# Patient Record
Sex: FEMALE | Race: WHITE
[De-identification: names, ages, dates, MRNs, and addresses within clinical notes are randomized per-mention and may not be internally consistent; named-entity substitution may affect disease eponyms.]

---

## 2017-10-18 ENCOUNTER — HOSPITAL ENCOUNTER (OUTPATIENT)
Dept: HOSPITAL 80 - FCATH | Age: 77
Discharge: HOME | End: 2017-10-18
Attending: INTERNAL MEDICINE
Payer: COMMERCIAL

## 2017-10-18 DIAGNOSIS — E78.5: ICD-10-CM

## 2017-10-18 DIAGNOSIS — G47.34: ICD-10-CM

## 2017-10-18 DIAGNOSIS — I10: ICD-10-CM

## 2017-10-18 DIAGNOSIS — R06.02: ICD-10-CM

## 2017-10-18 DIAGNOSIS — Z95.2: ICD-10-CM

## 2017-10-18 DIAGNOSIS — E03.9: ICD-10-CM

## 2017-10-18 DIAGNOSIS — L93.0: ICD-10-CM

## 2017-10-18 DIAGNOSIS — Z85.828: ICD-10-CM

## 2017-10-18 DIAGNOSIS — I27.21: ICD-10-CM

## 2017-10-18 DIAGNOSIS — I08.1: Primary | ICD-10-CM

## 2017-10-18 DIAGNOSIS — I48.0: ICD-10-CM

## 2017-10-18 PROCEDURE — B245ZZ4 ULTRASONOGRAPHY OF LEFT HEART, TRANSESOPHAGEAL: ICD-10-PCS | Performed by: INTERNAL MEDICINE

## 2017-10-18 NOTE — POSTOPPROG
Post Op Note


Date of Operation: 10/18/17


Surgeon: Brandi Means


Anesthesia: IV Sedation


Pre-op Diagnosis: Valvular heart disease


Post-op Diagnosis: Valvular heart disease


Indication: Valvular heart disease


Procedure: KANIKA


Findings: see report.  Severe MR; mod to severe TR


Inf/Abcess present in the surg proc area at time of surgery?: No


EBL: none


Total fluids administered: 150 cc NS


Complications: 





none

## 2017-10-18 NOTE — ECHO
[email or web address]





[address] [address] 

Main: [phone] 



Fax: 



Transesophageal Echocardiography 

Name:            PHILOMENA CALI                          MR#:

[id number]

Study Date:      10/18/2017                            Study Time:

08:55 AM

YOB: 1940                            Age:

77 year(s)

Height:            ( )                                 Weight:

( )

BSA:                                                   Gender:

Female

Examination:     KANIKA                                   Indication:

Eval mitral and tricuspid valves/Dyspnea

Image Quality:                                         Contrast: 

Requested by:     Brandi Means  

Heart Rate:                                            Rhythm: 

BP:                / 



Procedure Staff 

Ultrasound Technician:   Esperanza Gillette 

Reading Physician:       Brandi Means 

Requesting Provider: 



KANIKA Exam Details 



Conclusions:          Normal global systolic LV function.  

Mildly dilated right ventricle.  

Mildly reduced RV function.  

The left atrium is severely dilated.  

An agitated saline study was performed and was negative for

intracardiac shunting.

OTILIA appears to be have been oversewn.  

Severe mitral valve regurgitation is present.  

An annuloplasty ring is noted in the mitral valve position.  

There is systolic flow reversal in the left upper pulmonary vein.  

Moderate to severe tricuspid valve regurgitation.  

The pulmonary artery pressure is moderately increased.  

RVSP is 58mmHG.. 







Measurements: 

Chambers                   Valvular Assessment AV/MV          Valvular

Assessment TV/PV



Normal                                 Normal

Normal

Name         Value   Range             Name        Value Range

Name          Value Range



TR Vmax:      3.45 mm/s ( - )  

TR PGmax:     48 mmHg ( - )  

syst. PAP: 58 mmHg ( - )  



Additional Measurements: 

Valvular Assessment TV/PV  



Patient: PHILOMENA CALI                        MRN: [id number]

Study Date: 10/18/2017   Page 1 of 2

08:55 AM 









Name                     Value  

CVP (est.):             10 mmHg    



Findings:             Left Ventricle: 

Normal global systolic LV function.  

Right Ventricle: 

Mildly dilated right ventricle. Mildly reduced RV function.  

Left Atrium: 

The left atrium is severely dilated. An agitated saline study was

performed and was negative for

intracardiac shunting. OTILIA appears to be have been oversewn.  

Mitral Valve: 

Severe mitral valve regurgitation is present. An annuloplasty ring is

noted in the mitral valve position.

There is systolic flow reversal in the left upper pulmonary vein.  

Aortic Valve: 

The aortic valve is tri-leaflet. The aortic valve is normal in

appearance. There is no aortic valve

regurgitation.  

Tricuspid Valve: 

The tricuspid valve appears normal. Moderate to severe tricuspid valve

regurgitation. The pulmonary

artery pressure is moderately increased. RVSP is 58mmHG..  

Pulmonic Valve: 

Trivial pulmonic valve regurgitation. 



l1n 



Electronically signed by Brandi Means on 10/18/2017 at 11:15 AM 

(No Signature Object) 



Patient: PHILOMENA CALI                        MRN: [id number]

Study Date: 10/18/2017   Page 2 of 2

08:55 AM 







[id number]

## 2017-11-12 ENCOUNTER — HOSPITAL ENCOUNTER (EMERGENCY)
Dept: HOSPITAL 80 - FED | Age: 77
Discharge: HOME | End: 2017-11-12
Payer: COMMERCIAL

## 2017-11-12 VITALS — RESPIRATION RATE: 16 BRPM | TEMPERATURE: 98.6 F

## 2017-11-12 VITALS — SYSTOLIC BLOOD PRESSURE: 149 MMHG | OXYGEN SATURATION: 94 % | DIASTOLIC BLOOD PRESSURE: 65 MMHG | HEART RATE: 61 BPM

## 2017-11-12 DIAGNOSIS — Z79.01: ICD-10-CM

## 2017-11-12 DIAGNOSIS — R07.9: Primary | ICD-10-CM

## 2017-11-12 DIAGNOSIS — I50.9: ICD-10-CM

## 2017-11-12 LAB
% IMMATURE GRANULYOCYTES: 0.5 % (ref 0–1.1)
ABSOLUTE IMMATURE GRANULOCYTES: 0.04 10^3/UL (ref 0–0.1)
ABSOLUTE NRBC COUNT: 0 10^3/UL (ref 0–0.01)
ADD DIFF?: NO
ADD MORPH?: NO
ADD SCAN?: NO
ANION GAP SERPL CALC-SCNC: 12 MEQ/L (ref 8–16)
ATYPICAL LYMPHOCYTE FLAG: 0 (ref 0–99)
CALCIUM SERPL-MCNC: 9.1 MG/DL (ref 8.5–10.4)
CHLORIDE SERPL-SCNC: 97 MEQ/L (ref 97–110)
CO2 SERPL-SCNC: 30 MEQ/L (ref 22–31)
CREAT SERPL-MCNC: 0.7 MG/DL (ref 0.6–1)
ERYTHROCYTE [DISTWIDTH] IN BLOOD BY AUTOMATED COUNT: 12.9 % (ref 11.5–15.2)
FRAGMENT RBC FLAG: 0 (ref 0–99)
GFR SERPL CREATININE-BSD FRML MDRD: > 60 ML/MIN/{1.73_M2}
GLUCOSE SERPL-MCNC: 89 MG/DL (ref 70–100)
HCT VFR BLD CALC: 46.2 % (ref 38–47)
HGB BLD-MCNC: 15.2 G/DL (ref 12.6–16.3)
LEFT SHIFT FLG: 0 (ref 0–99)
LIPEMIA HEMOLYSIS FLAG: 80 (ref 0–99)
MCH RBC BLDCO QN: 30.3 PG (ref 27.9–34.1)
MCHC RBC AUTO-ENTMCNC: 32.9 G/DL (ref 32.4–36.7)
MCV RBC AUTO: 92 FL (ref 81.5–99.8)
NRBC-AUTO%: 0 % (ref 0–0.2)
PLATELET # BLD: 191 10^3/UL (ref 150–400)
PLATELET CLUMPS FLAG: 30 (ref 0–99)
PMV BLD AUTO: 9.8 FL (ref 8.7–11.7)
POTASSIUM SERPL-SCNC: 3.4 MEQ/L (ref 3.5–5.2)
RBC # BLD AUTO: 5.02 10^6/UL (ref 4.18–5.33)
SODIUM SERPL-SCNC: 139 MEQ/L (ref 134–144)
TROPONIN I SERPL-MCNC: < 0.012 NG/ML (ref 0–0.03)

## 2017-11-12 NOTE — EDPHY
H & P


Time Seen by Provider: 11/12/17 11:44


HPI/ROS: 





CHIEF COMPLAINT:  Chest pain





HISTORY OF PRESENT ILLNESS:  Patient is a 77-year-old female with a history of 

mitral valve and tricuspid valve disease who presents to the emergency 

department with intermittent chest pain since last night.  Patient is followed 

by Dr. Brandi Means.  Patient states that last night she was woken from sleep 

with left lateral chest pain.  She took CP medicine in her symptoms improved.  

She went back to bed.  Upon waking her pain had resolved.  However, she again 

developed intermittent, waxing and waning left-sided chest pain throughout the 

morning.  She has no chest pain currently.  She reports mild dizziness 

yesterday but none today.  No shortness of breath.  No cough or fever.  No leg 

pain or swelling.  No nausea or vomiting.





REVIEW OF SYSTEMS:  


My complete review of systems is negative except as mentioned in the HPI.


Past Medical/Surgical History: 





Includes mitral valve disease, tricuspid valve disease, GERD camera nodes, DVT, 

connective tissue disorder, CHF





Past surgical history:  Includes mitral valve repair, tubal ligation





Social history:  The patient does not smoke


Smoking Status: Never smoked


Physical Exam: 





Vitals noted.


GENERAL:  Well-appearing, in no acute distress, alert.


HEENT:  Eyes normal to inspection, normal pharynx, no signs of dehydration.


NECK:  No thyromegaly, no lymphadenopathy, supple.


RESPIRATORY:  Clear to auscultation bilaterally, no rales, rhonchi or wheezing.


CVS:  Regular rate and rhythm, no rubs, murmurs, or gallops.  No chest wall 

tenderness.


ABDOMEN:  Soft, nontender, nondistended, no organomegaly.


BACK:  Normal to inspection, no CVA tenderness.


SKIN:  Normal color, no rash, warm, dry.  No pallor.


EXTREMITIES:  No pedal edema, no calf tenderness, no Homans sign or cords, no 

joint swelling.


NEURO/PSYCH:  Alert and oriented, normal mood and affect, normal motor sensory 

exam.  


Constitutional: 


 Initial Vital Signs











Temperature (C)  37.0 C   11/12/17 11:05


 


Heart Rate  76   11/12/17 11:05


 


Respiratory Rate  16   11/12/17 11:05


 


Blood Pressure  152/89 H  11/12/17 11:05


 


O2 Sat (%)  92   11/12/17 11:05








 











O2 Delivery Mode               Room Air


 


O2 (L/minute)                  2














Allergies/Adverse Reactions: 


 





Penicillins Allergy (Severe, Verified 06/02/16 07:58)


 Hives








Home Medications: 














 Medication  Instructions  Recorded


 


Levothyroxine [Synthroid 50 mcg 50 mcg PO DAILY 01/06/14





(*)]  


 


Metoprolol Tartrate [Lopressor 100 100 mg PO BID 05/31/16





mg (*)]  


 


Warfarin Sodium [Coumadin 2.5MG 2.5 mg PO TUTH 05/31/16





(*)]  


 


Alendronate Sodium [Fosamax 70 MG 70 mg PO FR@0700 06/02/16





(*)]  


 


Furosemide [Lasix 40 MG (*)] 40 mg PO DAILY #30 tab 10/18/17


 


Herbals/Supplements -Info Only 1 ea PO DAILY 10/18/17


 


Warfarin Sodium [Coumadin 5MG (*)] 5 mg PO SUMOWEFRSA 10/18/17


 


Zolpidem Tartrate [Ambien 5MG (*)] 5 mg PO HS PRN 10/18/17


 


amLODIPine BESYLATE [Norvasc 5 mg 5 mg PO DAILY #30 tab 10/18/17





(*)]  


 


rOPINIRole HCL [Requip 0.25mg (RX)] 0.25 mg PO HS PRN 10/18/17














Medical Decision Making





- Diagnostics


Imaging Results: 


 Imaging Impressions





Chest X-Ray  11/12/17 12:35


Impression: Prior cardiac surgery. Central pulmonary enlargement compatible 

with chronic hypertension. No acute cardiopulmonary abnormality..











ED Course/Re-evaluation: 





I discussed the case briefly Dr. Brandi Means called in prior to the patient's 

arrival.  I discussed the plan with the patient.  I answered all her questions.

  She was given aspirin 324 mg orally.





EKG shows normal sinus rhythm, normal rate, normal axis, normal intervals.  Q-

wave in I, avL. There are no ST or T-wave abnormalities.


Old lateral infarct.





Troponin negative.  Patient's BNP is mildly elevated at 500. Chemistry panel is 

unremarkable.  D-dimer negative.  CBC is unremarkable.





I discussed the results with Dr. Brandi Means.  She recommended the patient be 

discharged from the emergency department.  She has an appointment with her on 

Thursday.  I discussed the findings with the patient.  She is feeling well at 

this time.  She has no chest pain.  She is given warnings prior to leaving.  

She will return with worsening symptoms.


Differential Diagnosis: 





My differential includes but is not limited to ACS, acute MI, foul disease, CHF

, myopathy, pericarditis, pneumonia, bronchitis, pleurisy, pneumothorax





- Data Points


Laboratory Results: 


 Laboratory Results





 11/12/17 11:20 





 11/12/17 11:20 





 











  11/12/17 11/12/17 11/12/17





  11:20 11:20 11:20


 


WBC      





    


 


RBC      





    


 


Hgb      





    


 


Hct      





    


 


MCV      





    


 


MCH      





    


 


MCHC      





    


 


RDW      





    


 


Plt Count      





    


 


MPV      





    


 


Neut % (Auto)      





    


 


Lymph % (Auto)      





    


 


Mono % (Auto)      





    


 


Eos % (Auto)      





    


 


Baso % (Auto)      





    


 


Nucleat RBC Rel Count      





    


 


Absolute Neuts (auto)      





    


 


Absolute Lymphs (auto)      





    


 


Absolute Monos (auto)      





    


 


Absolute Eos (auto)      





    


 


Absolute Basos (auto)      





    


 


Absolute Nucleated RBC      





    


 


Immature Gran %      





    


 


Immature Gran #      





    


 


D-Dimer    < 0.27 ug/mLFEU ug/mLFEU  





    (0.00-0.50)  


 


Sodium      139 mEq/L mEq/L





     (134-144) 


 


Potassium      3.4 mEq/L L mEq/L





     (3.5-5.2) 


 


Chloride      97 mEq/L mEq/L





     () 


 


Carbon Dioxide      30 mEq/l mEq/l





     (22-31) 


 


Anion Gap      12 mEq/L mEq/L





     (8-16) 


 


BUN      16 mg/dL mg/dL





     (7-23) 


 


Creatinine      0.7 mg/dL mg/dL





     (0.6-1.0) 


 


Estimated GFR      > 60 





    


 


Glucose      89 mg/dL mg/dL





     () 


 


Calcium      9.1 mg/dL mg/dL





     (8.5-10.4) 


 


Troponin I      < 0.012 ng/mL ng/mL





     (0.000-0.034) 


 


NT-Pro-B Natriuret Pep  589 pg/mL H pg/mL    





   (0-450)   














  11/12/17





  11:20


 


WBC  7.43 10^3/uL 10^3/uL





   (3.80-9.50) 


 


RBC  5.02 10^6/uL 10^6/uL





   (4.18-5.33) 


 


Hgb  15.2 g/dL g/dL





   (12.6-16.3) 


 


Hct  46.2 % %





   (38.0-47.0) 


 


MCV  92.0 fL fL





   (81.5-99.8) 


 


MCH  30.3 pg pg





   (27.9-34.1) 


 


MCHC  32.9 g/dL g/dL





   (32.4-36.7) 


 


RDW  12.9 % %





   (11.5-15.2) 


 


Plt Count  191 10^3/uL 10^3/uL





   (150-400) 


 


MPV  9.8 fL fL





   (8.7-11.7) 


 


Neut % (Auto)  70.7 % %





   (39.3-74.2) 


 


Lymph % (Auto)  17.2 % %





   (15.0-45.0) 


 


Mono % (Auto)  8.5 % %





   (4.5-13.0) 


 


Eos % (Auto)  2.7 % %





   (0.6-7.6) 


 


Baso % (Auto)  0.4 % %





   (0.3-1.7) 


 


Nucleat RBC Rel Count  0.0 % %





   (0.0-0.2) 


 


Absolute Neuts (auto)  5.25 10^3/uL 10^3/uL





   (1.70-6.50) 


 


Absolute Lymphs (auto)  1.28 10^3/uL 10^3/uL





   (1.00-3.00) 


 


Absolute Monos (auto)  0.63 10^3/uL 10^3/uL





   (0.30-0.80) 


 


Absolute Eos (auto)  0.20 10^3/uL 10^3/uL





   (0.03-0.40) 


 


Absolute Basos (auto)  0.03 10^3/uL 10^3/uL





   (0.02-0.10) 


 


Absolute Nucleated RBC  0.00 10^3/uL 10^3/uL





   (0-0.01) 


 


Immature Gran %  0.5 % %





   (0.0-1.1) 


 


Immature Gran #  0.04 10^3/uL 10^3/uL





   (0.00-0.10) 


 


D-Dimer  





  


 


Sodium  





  


 


Potassium  





  


 


Chloride  





  


 


Carbon Dioxide  





  


 


Anion Gap  





  


 


BUN  





  


 


Creatinine  





  


 


Estimated GFR  





  


 


Glucose  





  


 


Calcium  





  


 


Troponin I  





  


 


NT-Pro-B Natriuret Pep  





  











Medications Given: 


 








Discontinued Medications





Aspirin (Aspirin)  324 mg PO EDNOW ONE


   Stop: 11/12/17 12:31


   Last Admin: 11/12/17 12:32 Dose:  324 mg








Departure





- Departure


Disposition: Home, Routine, Self-Care


Clinical Impression: 


Chest pain


Qualifiers:


 Chest pain type: unspecified Qualified Code(s): R07.9 - Chest pain, unspecified





Condition: Good


Instructions:  Chest Pain (ED)


Additional Instructions: 


Return with increasing chest pain, shortness of breath or any other concerns.


Referrals: 


Roseann Bautista MD [Primary Care Provider] - 2-3 days without fail


Brandi Means MD [Medical Doctor] - 3-4 days, if not improved

## 2017-11-12 NOTE — CPEKG
Heart Rate: 55

RR Interval: 1091

P-R Interval: 200

QRSD Interval: 104

QT Interval: 488

QTC Interval: 467

P Axis: 53

QRS Axis: 133

T Wave Axis: 44

EKG Severity - ABNORMAL ECG -

EKG Impression: SINUS RHYTHM

EKG Impression: LATERAL INFARCT, OLD

Electronically Signed By: Brandie Alvarado 12-Nov-2017 15:20:40

## 2017-12-19 NOTE — PDDXCAT
Diagnostic Cath Note





- .


Date: 12/19/17


: Clary


Assistant: Cisco


Indication: other (pre mitral valve surgery)





- Procedure


Access: left wrist


Procedure: left heart catheterization, coronary angiography





- Materials


Left Heart Cath size: 5F


Left Heart Cath materials: standard multipack (JL4, JR4, pigtail)


Right Heart Cath size: 5F


Right Heart Cath materials: PWP catheter





- Findings-Left Heart Catheterization


LM: normal


LAD: normal with one principal diagonal


LCX: normal with one tortuous normal OM.  codominant


RCA: Tortuous.  No sig CAD.  Codominant


LVEF: did not cross aortic valve





- Findings-Right Heart Catheterization


AO: 139/69


Complications: none


Estimated blood loss: <50ml


Closure method: TR Band


Assessment: Tortuous coronary arteries without significant CAD.  Codominant 

system.  Attempted right heart cath from left brachial, but patient has 

aberrant anatomy


Plan: 





Admit for mitral valve surgery


Patient Problems: 


 Problems











Problem Status Onset


 


Acute anterior epistaxis Acute

## 2017-12-19 NOTE — CPEKG
Heart Rate: 120

RR Interval: 500

P-R Interval: 112

QRSD Interval: 92

QT Interval: 352

QTC Interval: 498

P Axis: 170

QRS Axis: 123

T Wave Axis: -19

EKG Severity - ABNORMAL ECG -

EKG Impression: LIKELY ATRIAL FLUTTER

EKG Impression: RIGHT AXIS DEVIATION

EKG Impression: CONSIDER LATERAL INFARCT, OLD

EKG Impression: ABNORMAL T, CONSIDER ISCHEMIA, INFERIOR LEADS

EKG Impression: BORDERLINE PROLONGED QT INTERVAL

EKG Impression: COMPARED WITH 11/12/2017 11:21 A.M., ATRIAL FLUTTER NOW PRESENT

Electronically Signed By: Brandi Means 19-Dec-2017 19:11:01

## 2017-12-20 NOTE — PDANEPAE
ANE History of Present Illness





here for mvr/tvr redo





ANE Past Medical History





- Cardiovascular History


Hx Hypertension: Yes


Hx Arrhythmias: Yes


Hx Chest Pain: No


Hx Coronary Artery / Peripheral Vascular Disease: No


Hx CHF / Valvular Disease: Yes


Hx Palpitations: No


Cardiovascular History Comment: afib.  hx of MVR 2011.  hx of dvt.  

hyperlipidemia





- Pulmonary History


Hx COPD: No


Hx Asthma/Reactive Airway Disease: No


Hx Recent Upper Respiratory Infection: No


Hx Oxygen in Use at Home: No


Hx Sleep Apnea: No


Sleep Apnea Screening Result - Last Documented: Negative


Pulmonary History Comment: hypoxia r/t sleep





- Neurologic History


Hx Cerebrovascular Accident: Yes


Hx Seizures: No


Hx Dementia: No


Neurologic History Comment: TIA 2010





- Endocrine History


Hx Diabetes: No


Endocrine History Comment: hypothyroidism





- Renal History


Hx Renal Disorders: No





- Liver History


Hx Hepatic Disorders: No





- Neurological & Psychiatric Hx


Hx Neurological and Psychiatric Disorders: No





- Cancer History


Hx Cancer: Yes


Cancer History Comment: squamous cell-mohs procedure





- Congenital Disorder History


Hx Congenital Disorders: No





- GI History


Hx Gastrointestinal Disorders: Yes


Gastrointestinal History Comment: hemorrhoids





- Other Health History


Other Health History: wears glasses.  macular degeneration.  lupus





- Chronic Pain History


Chronic Pain: No





- Surgical History


Prior Surgeries: hysterectomy 1999-dmitry.  tubal ligation 1984.  MVR 2001.  mohs 

procedure





ANE Review of Systems


Review of systems is: negative


Review of Systems: 








- Exercise capacity


Exercise capacity: <4 METS


METS (RN): 3 METS





ANE Patient History





- Allergies


Allergies/Adverse Reactions: 








Penicillins Allergy (Severe, Verified 12/20/17 07:29)


 Hives








- Home Medications


Home medications: home medication list seen and reviewed


Home Medications: 








Metoprolol Tartrate [Lopressor 100 mg (*)] 100 mg PO BID 05/31/16 [Last Taken 12 /19/17]


Warfarin Sodium [Coumadin 5MG (*)] 5 mg PO SUMOWEFRSA@16 10/18/17 [Last Taken 12 /13/17]


Zolpidem Tartrate [Ambien 5MG (*)] 5 mg PO HS PRN 10/18/17 [Last Taken 12/17/17]


rOPINIRole HCL [Requip 0.25mg (RX)] 0.25 mg PO HS PRN 10/18/17 [Last Taken 12/17 /17]


Furosemide [Lasix 20 MG (*)] 60 mg PO DAILY 12/08/17 [Last Taken 12/18/17]


Potassium Cl [Klor-Con 20 meq (*)] 20 meq PO BID 12/08/17 [Last Taken 12/18/17]


Warfarin Sodium [Coumadin 5MG (*)] 2.5 mg PO TUTH@16 12/08/17 [Last Taken 12/12/ 17]


amLODIPine BESYLATE [Norvasc 5 mg (*)] 5 mg PO DAILY PRN 12/08/17 [Last Taken 12 /17/17]


Enoxaparin [Lovenox 60 MG (*)] 60 mg SQ BID 12/19/17 [Last Taken 12/18/17 09:00]








- NPO status


NPO Since - Liquids (Date): 12/20/17


NPO Since - Liquids (Time): 00:01


NPO Since - Solids (Date): 12/20/17


NPO Since - Solids (Time): 00:01





- Smoking Hx


Smoking Status: Never smoked





- Family Anes Hx


Family Hx Anesthesia Complications: NONE





ANE Labs/Vital Signs





- Labs


Result Diagrams: 


 12/19/17 09:35





 12/19/17 09:35





- Vital Signs


Blood Pressure: 151/90


Heart Rate: 66


Respiratory Rate: 96


O2 Sat (%): 2


Height: 165 cm


Weight: 56.9 kg





ANE Physical Exam





- Airway


Neck exam: FROM


Mallampati Score: Class 1





- Pulmonary


Pulmonary: no respiratory distress





- Cardiovascular


Cardiovascular: regular rate and rhythym





- ASA Status


ASA Status: IV





ANE Anesthesia Plan


Anesthesia Plan: general endotracheal anesthesia


Lines/Monitors: central line, KANIKA

## 2017-12-20 NOTE — PDHPUP
History & Physical Update


H&P update statement: 


This history and physical update is based on an assessment of the patient which 

was completed after admission or registration (within 24 hours), but prior to 

the surgery/procedure.





H&P changes: LHC codominant circulation, no CAD.  Carotid US no hemodynamically 

sig stenosis.  CT cardiomegaly, mild CHF, 4.1 cm asc ao, partially calcified 

pericardium rt atrial border.  tele rhythm SR/SB, PAFlutter.  Probable PPM 

early postop if high degree block/pacer dependent

## 2017-12-20 NOTE — POSTANESTH
Post Anesthetic Evaluation


Cardiovascular Status: Normal, Stable


Respiratory Status: Requires Airway Assist (intubated)


Level of Consciousness/Mental Status: Mildly Sleepy, Arousable


Pain Control: Adequate, Prn Tx Ordered


Nausea/Vomiting Control: Adequate, Prn Tx Ordered


Complications Possibly Related to Anesthesia: None Noted

## 2017-12-21 NOTE — GOP
[f rep st]



                                                                OPERATIVE REPORT





DATE OF OPERATION:  12/19/2017



SURGEON:  Fausto Anglin DO



ASSISTANT:  SHAHLA Menard



ANESTHESIOLOGIST:  Junaid Moreira MD



PREOPERATIVE DIAGNOSIS:  Congestive heart failure, severe mitral and tricuspid insufficiency, and per
sistent atrial fibrillation.



POSTOPERATIVE DIAGNOSIS:  Congestive heart failure, severe mitral and tricuspid insufficiency, and pe
rsistent atrial fibrillation.



PROCEDURE PERFORMED:  

1.  Reoperation mitral valve replacement with a #25 Magna bioprosthesis.

2.  Tricuspid ring annuloplasty with a #28 Anderson annuloplasty ring.

3.  Gusman-Maze IV both left and right side using cryo and radiofrequency, except for the left pulmonary
 vein antrum where there was concern about phrenic nerve proximity.

4.  Left ventricular lead tunnel to the left subclavian pocket.



FINDINGS:  Patient had long-standing persistent atrial fibrillation.  She has recurrent mitral regurg
itation despite a previous mitral valve repair with ring.  She also had moderate tricuspid insufficie
ncy and known congenital absence of the left pericardium.





DESCRIPTION OF PROCEDURE:  The patient was consented, brought to the operating room, intubated, and m
onitoring lines were placed.  She was prepped and draped in sterile classical manner.  The right comm
on femoral artery was exposed because of the CAT scan proximity of the aorta and right atrium to the 
anterior chest wall, without apparent closure of the pericardium.  Oscillating saw was used to re-ent
er the sternum.  Marsupialization of the left and right side was performed.  We then spent some time 
dissecting out the pericardium from the right atrium and the aorta.  It should be noted that the left
 phrenic nerve traversed across the right ventricle and attached approximately in the subxiphoid sheila
on to the diaphragm.  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  She was hepar
inized, cannulated, and bypass was begun.  A cardioplegic arrest was obtained with antegrade cardiopl
egia, topical hypothermia, and systemic cooling.  Initially pulmonary vein ablation was performed, wi
th multiple ablations using radiofrequency until the impedance was less than 10 seconds.  Approximate
ly 9 lesion sets were performed.  We then exposed the coronary sinus at the terminus of the left and 
right coronary systems, and did an external cryoablation there because of adhesions and more simple a
ccess and to avoid the posterior pericardium and esophagus.  Cryoablation was performed across the co
ronary sinus.  This area was marked.  We then proceeded with exposing the mitral valve through the [facility] superior pulmonary vein.  The ring was identified.  The valve was quite thickened, infused, and a
ppeared to be sewn at both commissures to almost a mitral stenosis picture.  We then proceeded with t
he dome and inferior wall lesions from the right superior and right inferior to the left superior and
 left inferior pulmonary veins, creating a box set.  Multiple ablations were performed.  We also repe
ated the inferior one with cryo to make sure that we had a complete lesion set.  Because of where the
 phrenic nerve traveled across the antrum of the left atrium on the left, I was concerned about clamp
ing it and considered performing cryo from inside, but decided not to to avoid injuring the phrenic n
erve.  We then performed the isthmus lesion overlapping with the previously placed cryo lesion across
 the coronary sinus, and repeated the cryo lesion across the coronary sinus from the inside, overlapp
ing the mitral annulus.  We then removed the annuloplasty ring, debrided the pannus tissue, excised t
he anterior leaflet and a portion of the posterior leaflet in order to fit a 25 Sizer, which was quit
e tight for a Magna valve.  Interrupted 2-0 Ti-Cron pledgeted mattress sutures were used to seat the 
valve without difficulty.  Left atrium was closed in standard fashion.  CO2 had been infused througho
ut the procedure.  We then placed the left ventricular lead, which was tacked on the epicardium in or
maggi to avoid its dislodgement, and brought that up into the anterior mediastinum.  This was the bipol
ar lead.  The crossclamp was then removed with suction on the ascending aortic vent.  We then perform
ed tricuspid valve annuloplasty with interrupted 2-0 Ti-Cron mattress sutures through a 28 mm Anderson
 ring.  The valve appeared to be competent at that point.  We then closed the right atrium after supe
rior and inferior vena caval lines and the free wall lines were completed.  The patient was then kept
 in Trendelenburg.  Spontaneous cardiac activity was noted to resume, and the patient was easily wean
ed from bypass.  The heparin was reversed with protamine.  Cannula was removed and oversewn.  Two ewelina
tricular pacing wires were placed.  Echo revealed good mitral valve function without leak.  LV functi
on remained preserved.  The sternum was closed after the pericardium and thymic fat were closed as mu
ch as possible.  The sternum was closed in standard fashion.  It should be noted that we also perform
ed testing for entrance and exit block on the right.  We were unable to on the left.  The block was c
onfirmed by testing.  The sternum was closed in standard fashion, and patient was returned to ICU in 
stable condition.





Job #:  [id number]

## 2017-12-21 NOTE — GPN
[f rep st]



                                                                 PROCEDURE NOTE





PROCEDURE:  Intubation.



INDICATION:  Acute respiratory failure.



ANESTHESIA:  She was given Versed 2 mg and succinylcholine 50 mg.  



Procedure was performed in the intensive care unit with continuous pulse ox, EKG, and blood pressure 
monitoring.



PROCEDURE:  Via GlideScope, patient was intubated with a #7 endotracheal tube and taped at 22 cm at t
he lip.  Tracheal position was confirmed via capnograph.  Patient tolerated the procedure well.  Ther
e were no apparent complications.  Portable chest x-ray has been called for.





Job #:  [id number]

## 2017-12-21 NOTE — ASMTCMCOM
CM Note

 

CM Note                       

Notes:

Patient is POD #1 MVR with Dr Anglin. She is intubated and sedated in the ICU.



I spoke with patient's boyfriend, grandaughter, and son. They informed me that patient is normally 

very independent, lives alone, and is active in the community. Her hope before surgery was to 

return home with family support. We discussed the possible discharge plans of home 

independent, home with homecare, and SNF. The family is open to what we recommend and realize that 

nothing will be determined in these immediate days following surgery. I gave them a list of SNF to 

consider.



Current CM Discharge plan: TBD

 

Date Signed:  12/21/2017 03:17 PM

Electronically Signed By:Annie Wagoner RN

## 2017-12-21 NOTE — GCON
[f rep st]



                                                                    CONSULTATION





INTENSIVE CONSULTATION



HISTORY OF PRESENT ILLNESS:  The patient examined postoperatively after receiving a redo median king
otomy with an explant of old mitral valve ring with an implant of a mitral valve bioprosthesis.  She 
had also a Gusman maze and placement of a left ventricular epicardial lead.  This patient is a 77-year-o
ld white female with a past medical history including atrial fibrillation, cataract, congestive heart
 failure, glaucoma, hyperlipidemia, hypertension, hypothyroidism, TIAs, pulmonary, hypertension, oste
openia.  Again, she is examined postoperatively.  The patient is currently awake and alert, and on me
chanical ventilation.  All history is gleaned from the medical records.  She has a known history of m
itral regurg and tricuspid regurg, and both were currently in the severe range, which led to a mitral
 valve replacement and tricuspid valve replacement.  Currently, she is resting comfortably and does h
ave some evidence of anxiety.  She is not currently complaining of pain.



PAST MEDICAL HISTORY:  As above.



PAST SURGICAL HISTORY:  She has had a hysterectomy, mitral valvuloplasty, tubal ligation, and squamou
s cell carcinoma removed.



ALLERGIES:  Penicillin.



SOCIAL HISTORY:  No history of tobacco use.  Infrequent alcohol use.  She is single.  She is a retire
d nurse.



MEDICATIONS:  Home:  Alendronate, Benadryl, calcium, magnesium, zinc, Lasix, levothyroxine, metoprolo
l, Norvasc, potassium, ropinirole, warfarin and zolpidem.



FAMILY HISTORY:  Significant for congenital heart disease and rheumatic heart disease.



REVIEW OF SYSTEMS:  A 10-point review of systems was obtained and is negative with the exception of H
PI.



PHYSICAL EXAM:  VITAL SIGNS:  Blood pressure is 122/52, pulse 62, respirations 25, temperature 36.3, 
oxygen saturation 92% on mechanical ventilation.  GENERAL:  She is a thin, but well-developed, elderl
y white female, who is resting comfortably on mechanical ventilation.  HEENT:  Eyes are PERRLA, EOMI.
  Throat endotracheal tube is in good position.  NECK:  Supple.  No cervical adenopathy.  HEART:  Reg
ular rate and rhythm with a 2/6 systolic murmur left sternal border without radiation.  LUNGS:  Dimin
ished breath sounds, but no wheeze.  ABDOMEN:  Soft and nontender.  Bowel sounds are present.  EXTREM
ITIES:  No clubbing, cyanosis, or edema.



LABORATORIES:  White count 6.4, hemoglobin 10, hematocrit 30, platelet count is 87, INR is 0.87, sodi
um 144, potassium 3.8, chloride 105, CO2 26, BUN is 15, creatinine 0.6, glucose is 126.  Arterial blo
od gas, pH 7.37, pCO2 of 42, PO2 of 41, bicarb of 25, oxygen saturation 96%.  



Chest x-ray, reviewed by myself, shows endotracheal tube in good position.  There is some evidence of
 minimal atelectasis noted, but no pneumothorax.



IMPRESSION:  

1.  Status post mitral valve replacement, tricuspid Gusman maze, and epicardial lead placement.

2.  Acute respiratory failure secondary to above.

3.  History of atrial fibrillation.

4.  Cataracts.

5.  Glaucoma.

6.  Hypothyroidism.

7.  Hypertension.

8.  Hyperlipidemia.

9.  Lupus.

10.  Osteopenia.

11.  History of transient ischemic attacks.



RECOMMENDATIONS:  

1.  We will attempt to wean the patient from mechanical ventilation this morning.

2.  DVT and PE prophylaxis.

3.  Stress ulcer prophylaxis.

4.  Adequate pain control.

5.  Early ambulation.

6.  PT and OT.

7.  Speech evaluation.  



Please note, 35 minutes of critical care time was spent with the patient.  The case was discussed wit
h Respiratory Therapy and Nursing.





Job #:  [id number]

## 2017-12-21 NOTE — SOAPPROG
SOAP Progress Note


Assessment/Plan: 


Assessment: POD#1 Redo median sternotomy, explant old MV ring, implant #25 

Magna mitral bioprosthesis, TVA #28 Anderson MC3 ring, CoxMaze IV, placement LV 

epicardial lead tunneled to left subclavicular space





Severe MR/failed MV rpr - Ring exchanged for tissue MVR. Antithrombotic 

prophylaxis with Coumadin as per existing parameters once coagulopathy fully 

resolved and permanent pacemaker needs clearer.





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 Antithrombotic prophylaxis as per 

MVR.





Paroxysmal atrial fib/flutter, chronically anticoagulated with Coumadin - 

Predominant post CM4 rhythm SB/SR with occ JR/PACs. No sustained backup pacing. 

Remains at high risk of conduction abnormalities given double valve, maze. 

Antinodal use only prn AF with rapid RVR. Resumption of antithrombotic 

prophylaxis with Coumadin when appropriate.





Chronic dCHF - Class II on maximal medical therapy. Off CPB without inotropic 

or pressor support. Adequately diuresing significant volume overload with 

stable renal fx. 





Acute postoperative respiratory insufficiency - Kept intubated while 

coagulopathy corrected. Minimal vent support. Prompt wean to extubation 

anticipated this am.





Acute expected blood loss anemia with thrombocytopenia and coagulopathy - 

Exacerbated by lovenox bridge and CPB. Refractory to fairly significant 

correction with blood and blood products. Ultimately responsive to Factor VII. 

Follow.











Plan:


Vent wean per pulm.


Cont backup VVI @ 48.


Consider switch blakes to bulb after extubation.


Keep clinton and andreina until extubated.


Strict NPO pending clearance for orals by SLP.





12/21/17 06:42











Subjective: 





Lightly sedated on vent. Opens eyes, MAEE. Mouthing words around the ETT. 

Trying to use hand signals. Nods head appropriately to simple questions.


Objective: 





 Vital Signs











Temp Pulse Resp BP Pulse Ox


 


 36.8 C   60   16   130/60 H  95 


 


 12/21/17 06:00  12/21/17 06:00  12/21/17 06:00  12/21/17 06:00  12/21/17 06:00








 Laboratory Results





 12/21/17 04:00 





 12/21/17 04:00 





 











 12/20/17 12/21/17 12/22/17





 05:59 05:59 05:59


 


Intake Total 750 5936.0 


 


Output Total  5525 113


 


Balance 750 411.0 -113








 











PT  12.0 SEC (12.0-15.0)   12/21/17  04:00    


 


INR  0.87  (0.83-1.16)   12/21/17  04:00    








Transient nicardipine overnoc for (anxiety driven) HTN. 


VVI paced early postop for JR. SB/SR with 1st degree AVB, frequent PACs/PJCs 

overnoc. Backup pacing rate reduced to 48 and not triggered.


CXR-> no PTX, mild pulm vasc congestion, small left pleural effusion vs 

atelectasis, pleural drains in good position.


CTOP significantly elevated until administration of Factor VII. 


Balanced I/Os. 


Normalized coags.





Physical Exam





- Physical Exam


General Appearance: mild distress (wants to talk), anxiety (aware she was 

bleeding more than expected and nervous about implications)


Respiratory: lungs clear (grossly), other (blakes x 3 y-d to pleurovac, thin 

serosang drainage, no visible clot, no air leak)


Cardiac/Chest: regular rate, rhythm (with freq premature beats)


Peripheral Pulses: 2+: dorsalis-pedis (R), dorsalis-pedis (L)


Abdomen: normal bowel sounds, non-tender, soft


Skin: warm/dry


Extremities: swelling (1+ gen)





ICD10 Worksheet


Patient Problems: 


 Problems











Problem Status Onset


 


Acute blood loss anemia Acute  


 


Coagulopathy Acute  


 


S/P Maze operation for atrial fibrillation Acute  ~12/20/17


 


S/P mitral valve replacement with bioprosthetic valve Acute  ~12/20/17


 


S/P tricuspid valve repair Acute  ~12/20/17


 


Secondary tricuspid regurgitation Acute  


 


Severe mitral regurgitation Acute  


 


s/p placement LV epicardial lead Acute  ~12/20/17


 


Ascending aorta enlargement Chronic  


 


Atrial enlargement, bilateral Chronic  


 


Chronic anticoagulation Chronic  


 


Chronic diastolic congestive heart failure Chronic  


 


HTN (hypertension) Chronic  


 


Paroxysmal atrial fibrillation Chronic  


 


Pulmonary hypertension Chronic

## 2017-12-22 NOTE — SOAPPROG
SOAP Progress Note


Assessment/Plan: 


POD #2 Redo median sternotomy, explant old MV ring, implant #25 Magna mitral 

bioprosthesis, TVA #28 Anderson MC3 ring, CoxMaze IV, placement LV epicardial 

lead tunneled to left subclavicular space





Severe MR/failed MV rpr - Ring exchanged for tissue MVR. Antithrombotic 

prophylaxis with Coumadin, INR goal 2-3. 





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 Antithrombotic prophylaxis as per 

MVR.





Paroxysmal atrial fib/flutter s/p CM 4 -  JR/SB with morning with some . 

Continue to monitor. Will resume antithrombotic prophylaxis with Coumadin when 

appropraite. 





Class II Chronic dCHF  Lasix prn. BB when appropriate. 





Ventilator dependent respiratory failure - Re-intubated 12/21 for hypoxemia. 

Wean as tolerated as per pulmonolgy.  





Acute blood loss anemia with thrombocytopenia and coagulopathy - s/p multiple 

blood product transfusions now with serosanguineous drainage. Monitor and HCT/

platelets





DVT prophylaxis - continue SCDs, heparin SQ once platelets over 100.  





Subjective: 


Comfortable. Would like ETT out.


Objective: 





 Vital Signs











Temp Pulse Resp BP Pulse Ox


 


 36.0 C   59 L  14   151/71 H  97 


 


 12/22/17 06:00  12/22/17 06:00  12/22/17 06:00  12/22/17 06:00  12/22/17 06:00








 Laboratory Results





 12/22/17 04:15 





 12/22/17 04:15 





 











 12/21/17 12/22/17 12/23/17





 05:59 05:59 05:59


 


Intake Total 5936.0 1600.6 


 


Output Total 5525 2218 


 


Balance 411.0 -617.4 








 











PT  15.7 SEC (12.0-15.0)  H  12/22/17  04:15    


 


INR  1.23  (0.83-1.16)  H  12/22/17  04:15    














Physical Exam





- Physical Exam


General Appearance: alert, no apparent distress


EENT: ET tube, No scleral icterus (R), No scleral icterus (L)


Neck: normal inspection


Respiratory: lungs clear, No respiratory distress


Cardiac/Chest: bradycardia


Abdomen: non-tender, soft, No distended


Skin: normal color, warm/dry


Extremities: No pedal edema


Neuro/Psych: no motor/sensory deficits, alert





ICD10 Worksheet


Patient Problems: 


 Problems











Problem Status Onset


 


Acute blood loss anemia Acute  


 


Coagulopathy Acute  


 


S/P Maze operation for atrial fibrillation Acute  ~12/20/17


 


S/P mitral valve replacement with bioprosthetic valve Acute  ~12/20/17


 


S/P tricuspid valve repair Acute  ~12/20/17


 


Secondary tricuspid regurgitation Acute  


 


Severe mitral regurgitation Acute  


 


s/p placement LV epicardial lead Acute  ~12/20/17


 


Ascending aorta enlargement Chronic  


 


Atrial enlargement, bilateral Chronic  


 


Chronic anticoagulation Chronic  


 


Chronic diastolic congestive heart failure Chronic  


 


HTN (hypertension) Chronic  


 


Paroxysmal atrial fibrillation Chronic  


 


Pulmonary hypertension Chronic

## 2017-12-22 NOTE — PDINTPN
Intensivist Progress Note


Assessment/Plan: 


Assessment/plan:


* Status post mitral replacement, tricuspid repair, and Gusman Maze procedure


* Acute respiratory failure-patient reintubated yesterday secondary to hypoxemia


      -will check CPAP trial and weaning parameters this morning


* Atrial fibrillation


* Hypothyroidism-will check TSH


* Hypertension-stable


* Lupus


* VTE prophylaxis-continue SCDs.  Hold anticoagulation for now


* Nutrition-none currently


* Sedation-adequate.  Wean as tolerated








Case discussed with Nursing and Respiratory therapy

















Subjective: 





Patient is sedated.  Comfortable on mechanical ventilation.


Objective: 





 Vital Signs











Temp Pulse Resp BP Pulse Ox


 


 36.1 C   57 L  12   140/65 H  97 


 


 12/22/17 07:44  12/22/17 07:44  12/22/17 07:44  12/22/17 07:44  12/22/17 07:44








 Laboratory Results





 12/22/17 04:15 





 12/22/17 04:15 





 











 12/21/17 12/22/17 12/23/17





 05:59 05:59 05:59


 


Intake Total 5936.0 1600.6 


 


Output Total 5525 2218 


 


Balance 411.0 -617.4 








 











PT  15.7 SEC (12.0-15.0)  H  12/22/17  04:15    


 


INR  1.23  (0.83-1.16)  H  12/22/17  04:15    











 Laboratory Results





 12/22/17 04:15 





 12/22/17 04:15 





 











  12/22/17 12/22/17





  04:15 04:15


 


PT    15.7 SEC H SEC





   (12.0 - 15.0)


 


INR    1.23  H 





   (0.83 - 1.16)


 


Puncture Site  ARTERIAL LINE   





   


 


Patient Temperature  36.1 DEGREES DEGREES  





   


 


pCO2  36 mmHg mmHg  





  (34 - 38) 


 


pO2  92 mmHg H mmHg  





  (65 - 75) 


 


Total CO2  25 mEq/L mEq/L  





  (23 - 27) 


 


ABG pH  7.44   





  (7.35 - 7.45) 


 


ABG PO2/FiO2 Ratio  230 RATIO RATIO  





   


 


ABG HCO3  24 mEq/L mEq/L  





  (22 - 26) 


 


ABG O2 Saturation  98 % H %  





  (92 - 95) 


 


ABG Base Excess  0.4 mEq/L mEq/L  





  (-2.5 - 2.5) 


 


O2 Concentration %  40 % %  





   


 


Actual Respiration Rate  15   





   


 


Set Respiration Rate  12   





   


 


SIMV  YES   





   


 


Tidal Volume  500   





   


 


End Tidal CO2  38   





   


 


PEEP  5   





   


 


Pressure Support  10   





   








Chest x-ray-reviewed by myself.  Endotracheal tube and lines in good position.  

Minimal pulmonary edema





- Time Spent With Patient


Time Spent With Patient: 





35 min of critical care time spent with patient, over half of which involved 

with coordination of care





Physical Exam





- Physical Exam


General Appearance: other (Sedated), No alert


EENT: PERRL/EOMI, ET tube


Neck: non-tender, full range of motion


Respiratory: rales, No respiratory distress (The bibasilar), No accessory 

muscle use


Cardiac/Chest: normal peripheral pulses, regular rate, rhythm, systolic murmur


Abdomen: normal bowel sounds, non-tender, soft


Pelvic Exam: deferred


Rectal: deferred


Skin: normal color, warm/dry


Extremities: normal range of motion, non-tender, normal inspection, normal 

capillary refill


Neuro/Psych: No alert





ICD10 Worksheet


Patient Problems: 


 Problems











Problem Status Onset


 


Acute blood loss anemia Acute  


 


Coagulopathy Acute  


 


S/P Maze operation for atrial fibrillation Acute  ~12/20/17


 


S/P mitral valve replacement with bioprosthetic valve Acute  ~12/20/17


 


S/P tricuspid valve repair Acute  ~12/20/17


 


Secondary tricuspid regurgitation Acute  


 


Severe mitral regurgitation Acute  


 


s/p placement LV epicardial lead Acute  ~12/20/17


 


Ascending aorta enlargement Chronic  


 


Atrial enlargement, bilateral Chronic  


 


Chronic anticoagulation Chronic  


 


Chronic diastolic congestive heart failure Chronic  


 


HTN (hypertension) Chronic  


 


Paroxysmal atrial fibrillation Chronic  


 


Pulmonary hypertension Chronic

## 2017-12-23 NOTE — PDINTPN
Intensivist Progress Note


Assessment/Plan: 


Assessment/plan:


* Status post mitral replacement, tricuspid repair, and Gusman Maze procedure


* Acute respiratory failure-patient reintubated yesterday secondary to hypoxemia


      -will check CPAP trial and weaning parameters this morning


      -anticipate extubation later on this morning


* Atrial fibrillation


* Hypothyroidism-will check TSH


* Hypertension-stable


* Lupus


* VTE prophylaxis-continue SCDs.  Hold anticoagulation for now


* Nutrition-none currently


* Sedation-adequate.  Wean as tolerated


* Patient out of bed and up in chair.  Appears comfortable








Case discussed with Nursing, surgery and Respiratory therapy











Subjective: 





Awake and alert.  On mechanical ventilation.  Sitting up in chair since 06/30 

this morning.


Objective: 





 Vital Signs











Temp Pulse Resp BP Pulse Ox


 


 37.4 C   70   18   148/69 H  98 


 


 12/23/17 05:42  12/23/17 05:42  12/23/17 05:42  12/23/17 05:42  12/23/17 05:42








 Laboratory Results





 12/23/17 04:05 





 12/23/17 04:05 





 











 12/22/17 12/23/17 12/24/17





 05:59 05:59 05:59


 


Intake Total 1600.6 1246.9 


 


Output Total 2218 3400 


 


Balance -617.4 -2153.1 








 











PT  15.3 SEC (12.0-15.0)  H  12/23/17  04:05    


 


INR  1.19  (0.83-1.16)  H  12/23/17  04:05    














- Time Spent With Patient


Time Spent With Patient: 





35 min of critical care time spent with patient





Physical Exam





- Physical Exam


General Appearance: alert, no apparent distress


EENT: PERRL/EOMI, ET tube


Neck: non-tender, full range of motion, supple, normal inspection


Respiratory: chest non-tender, crackles (Few), No wheezing


Cardiac/Chest: normal peripheral pulses, regular rate, rhythm, systolic murmur


Peripheral Pulses: 2+: carotid (R), carotid (L), femoral (R), femoral (L), 

dorsalis-pedis (R), dorsalis-pedis (L)


Abdomen: normal bowel sounds, non-tender, soft


Pelvic Exam: deferred


Rectal: deferred


Skin: normal color, warm/dry


Extremities: non-tender, normal inspection


Neuro/Psych: alert





ICD10 Worksheet


Patient Problems: 


 Problems











Problem Status Onset


 


Acute blood loss anemia Acute  


 


Coagulopathy Acute  


 


S/P Maze operation for atrial fibrillation Acute  ~12/20/17


 


S/P mitral valve replacement with bioprosthetic valve Acute  ~12/20/17


 


S/P tricuspid valve repair Acute  ~12/20/17


 


Secondary tricuspid regurgitation Acute  


 


Severe mitral regurgitation Acute  


 


s/p placement LV epicardial lead Acute  ~12/20/17


 


Ascending aorta enlargement Chronic  


 


Atrial enlargement, bilateral Chronic  


 


Chronic anticoagulation Chronic  


 


Chronic diastolic congestive heart failure Chronic  


 


HTN (hypertension) Chronic  


 


Paroxysmal atrial fibrillation Chronic  


 


Pulmonary hypertension Chronic

## 2017-12-23 NOTE — SOAPPROG
SOAP Progress Note


Assessment/Plan: 


POD #3 Redo median sternotomy, explant old MV ring, implant #25 Magna mitral 

bioprosthesis, TVA #28 Anderson MC3 ring, CoxMaze IV, placement LV epicardial 

lead tunneled to left subclavicular space





Severe MR/failed MV rpr - Ring exchanged for tissue MVR. Antithrombotic 

prophylaxis with Coumadin, INR goal 2-3. 





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 Antithrombotic prophylaxis as per 

MVR.





Paroxysmal atrial fib/flutter s/p CM 4 -  Currently in SR. Will resume 

antithrombotic prophylaxis with Coumadin when appropriate. AF prophylaxis with 

BB/CCB as tolerated.





Class II Chronic dCHF  Lasix prn. BB when appropriate. 





Ventilator dependent respiratory failure - Re-intubated 12/21 for hypoxemia. 

Will likely be extubated this morning as per pulmonology. 





Acute blood loss anemia with thrombocytopenia and coagulopathy - s/p multiple 

blood product transfusions now with serosanguineous drainage. Monitor and HCT/

platelets





DVT prophylaxis - continue SCDs, heparin SQ once platelets over 100.  








Subjective: 


Comfortable. Using white board to communicate. Would like to know plan.


Objective: 





 Vital Signs











Temp Pulse Resp BP Pulse Ox


 


 37.4 C   70   18   148/69 H  98 


 


 12/23/17 05:42  12/23/17 05:42  12/23/17 05:42  12/23/17 05:42  12/23/17 05:42








 Laboratory Results





 12/23/17 04:05 





 12/23/17 04:05 





 











 12/22/17 12/23/17 12/24/17





 05:59 05:59 05:59


 


Intake Total 1600.6 1246.9 


 


Output Total 2218 3400 


 


Balance -617.4 -2153.1 








 











PT  15.3 SEC (12.0-15.0)  H  12/23/17  04:05    


 


INR  1.19  (0.83-1.16)  H  12/23/17  04:05    














Physical Exam





- Physical Exam


General Appearance: WD/WN, alert, no apparent distress


EENT: ET tube, No scleral icterus (R), No scleral icterus (L)


Neck: normal inspection


Respiratory: No respiratory distress


Cardiac/Chest: regular rate, rhythm


Abdomen: non-tender, soft, No distended


Skin: normal color, warm/dry


Neuro/Psych: no motor/sensory deficits, alert





ICD10 Worksheet


Patient Problems: 


 Problems











Problem Status Onset


 


Acute blood loss anemia Acute  


 


Coagulopathy Acute  


 


S/P Maze operation for atrial fibrillation Acute  ~12/20/17


 


S/P mitral valve replacement with bioprosthetic valve Acute  ~12/20/17


 


S/P tricuspid valve repair Acute  ~12/20/17


 


Secondary tricuspid regurgitation Acute  


 


Severe mitral regurgitation Acute  


 


s/p placement LV epicardial lead Acute  ~12/20/17


 


Ascending aorta enlargement Chronic  


 


Atrial enlargement, bilateral Chronic  


 


Chronic anticoagulation Chronic  


 


Chronic diastolic congestive heart failure Chronic  


 


HTN (hypertension) Chronic  


 


Paroxysmal atrial fibrillation Chronic  


 


Pulmonary hypertension Chronic

## 2017-12-23 NOTE — EDPHY
ED Progress Note


Narrative: 


I was called to the patient's bedside emergently in the ICU from the emergency 

department.  The patient was in respiratory failure s/p cardiothoracic surgery.

  I spoke briefly with Dr. Anglin.  He recommended that I intubate the patient 

due to her respiratory failure.  Nursing staff informed me that the patient's 

pH was 7.08 and that her CO2 was greater than 80.





On my initial evaluation the patient has systolic pressure of 110.  She was 

tachycardic into the 120s.  O2 sat 87% with poor wave form.  Patient eyes were 

open but she appeared to have significant respiratory distress. I informed her 

of the plan for intubation.  She nodded her head in agreement.





Procedure Note:


Procedure: RSI intubation


Indication: Respiratory failure 


The patient was preoxygenated with 100% oxygen by NRB.  I applied high flow 

Nasal canula.  Prior to the procedure there was no oxygen saturation 

measurement.  The patient had a marginal waveform and staff stated they were 

having difficult time picking up a reading.  The patient was sedated with [

ketamine] and paralyzed with [succinylcholine].  The patient was orally 

endotracheally intubated under video laryngoscopy with a 7.5 ETT.  Tracheal 

intubation was confirmed with misting on the tube; breath sounds were 

auscultated equally bilaterally; appropriate color change with [place] 

Tidal CO2 detector.  Oxygen saturation was still not reading postprocedure.  

The procedure was performed by myself.





The post intubation the patient did have a drop in her blood pressure over 

multiple readings.  I requested the patient be started on norepinephrine.  

Nursing staff was obtaining norepinephrine from the pharmacy.  Because the 

patient had an ongoing low pressure while awaiting the medication I gave the 

patient 0.1 ml of 1:10,000 epi.  This improved the patient's pressures.  Her 

pressure increased to 160 systolic.  A chest x-ray was ordered.





805:  Chest x-ray:  Cardiomegaly.  ET tube is in good placement.  No 

pneumothorax noted.

## 2017-12-24 NOTE — SOAPPROG
SOAP Progress Note


Assessment/Plan: 


POD #4 Redo median sternotomy, explant old MV ring, implant #25 Magna mitral 

bioprosthesis, TVA #28 Anderson MC3 ring, CoxMaze IV, placement LV epicardial 

lead tunneled to left subclavicular space





Severe MR/failed MV rpr - Ring exchanged for tissue MVR. Antithrombotic 

prophylaxis as per CM 4.





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 Antithrombotic prophylaxis as per 

CM 4.





Paroxysmal atrial fib/flutter s/p CM 4 -  Currently in SR. Will resume 

antithrombotic prophylaxis with Coumadin when appropriate. AF prophylaxis with 

only amiodarone for now. BB/CCB as tolerated.





Class II Chronic dCHF  Lasix prn. BB when appropriate. 





Ventilator dependent respiratory failure - Failed second extubation. Plan for 

tracheostomy as per Dr. Anglin. 





Acute blood loss anemia with thrombocytopenia and coagulopathy - s/p multiple 

blood product transfusions now with serosanguineous drainage. Monitor HCT/

platelets





DVT prophylaxis - continue SCDs and heparin SQ.  





Lines/drains - PICC ordered for 12/25. Will remove TLC after. FC and AL to be 

removed today. Drains likely to be removed 12/25.  








Subjective: 


Comfortable on vent.


Objective: 





 Vital Signs











Temp Pulse Resp BP Pulse Ox


 


 38.1 C   76   32 H  138/64 H  98 


 


 12/24/17 07:00  12/24/17 08:04  12/24/17 08:04  12/24/17 07:00  12/24/17 08:04








 Laboratory Results





 12/24/17 05:30 





 12/24/17 05:30 





 











 12/23/17 12/24/17 12/25/17





 05:59 05:59 05:59


 


Intake Total 1246.9 2608 


 


Output Total 3400 2220 


 


Balance -2153.1 388 








 











PT  16.7 SEC (12.0-15.0)  H  12/24/17  05:30    


 


INR  1.33  (0.83-1.16)  H  12/24/17  05:30    














Physical Exam





- Physical Exam


General Appearance: WD/WN, alert, no apparent distress


EENT: ET tube


Neck: normal inspection


Respiratory: other (tachypnea)


Cardiac/Chest: regular rate, rhythm


Abdomen: non-tender, soft, No distended


Extremities: pedal edema


Neuro/Psych: no motor/sensory deficits, alert, normal mood/affect, oriented x 3





ICD10 Worksheet


Patient Problems: 


 Problems











Problem Status Onset


 


Acute blood loss anemia Acute  


 


Coagulopathy Acute  


 


S/P Maze operation for atrial fibrillation Acute  ~12/20/17


 


S/P mitral valve replacement with bioprosthetic valve Acute  ~12/20/17


 


S/P tricuspid valve repair Acute  ~12/20/17


 


Secondary tricuspid regurgitation Acute  


 


Severe mitral regurgitation Acute  


 


s/p placement LV epicardial lead Acute  ~12/20/17


 


Ascending aorta enlargement Chronic  


 


Atrial enlargement, bilateral Chronic  


 


Chronic anticoagulation Chronic  


 


Chronic diastolic congestive heart failure Chronic  


 


HTN (hypertension) Chronic  


 


Paroxysmal atrial fibrillation Chronic  


 


Pulmonary hypertension Chronic

## 2017-12-24 NOTE — ASMTCMCOM
CM Note

 

CM Note                       

Notes:

Awaiting therapy evaluation recommendations. D/C needs TBD. CM following.

 

Date Signed:  12/24/2017 03:26 PM

Electronically Signed By:Evelyne Castaneda LCSW

## 2017-12-24 NOTE — PDINTPN
Intensivist Progress Note


Assessment/Plan: 


Assessment/plan:


* Status post mitral replacement, tricuspid repair, and Gusman Maze procedure


      -consider echo


* Acute respiratory failure-patient extubated yesterday morning did well 

throughout the day, but then failed early evening and was reintubated.  She 

flipped into atrial fibrillation around that time.    Chest x-ray post 

intubation showed more pulmonary edema.  ABG this morning shows significant 

respiratory alkalosis.  She is currently on assist-control and breathing 

approximately 30 times per minute.


      -will place her back on IMV with a rate of 14.  Recheck an ABG in a 

couple hours.


      -check chest x-ray


* Atrial fibrillation-back in sinus.  On amiodarone


* Hypothyroidism-will check TSH


* Hypertension-stable


* Lupus


* VTE prophylaxis-continue SCDs.  Hold anticoagulation for now


* Stress ulcer prophylaxis


* Nutrition-now on tube feed


* Sedation-may need increase in sedation.  Currently on low-dose Precedex








Case discussed with Nursing, surgery and Respiratory therapy








Subjective: 





Awake and alert on mechanical ventilation.  Somewhat tachypneic.


Objective: 





 Vital Signs











Temp Pulse Resp BP Pulse Ox


 


 38.1 C   76   32 H  138/64 H  98 


 


 12/24/17 07:00  12/24/17 08:04  12/24/17 08:04  12/24/17 07:00  12/24/17 08:04








 Laboratory Results





 12/24/17 05:30 





 12/24/17 05:30 





 











 12/23/17 12/24/17 12/25/17





 05:59 05:59 05:59


 


Intake Total 1246.9 2608 


 


Output Total 3400 2220 


 


Balance -2153.1 388 








 











PT  16.7 SEC (12.0-15.0)  H  12/24/17  05:30    


 


INR  1.33  (0.83-1.16)  H  12/24/17  05:30    














- Time Spent With Patient


Time Spent With Patient: 





35 min of critical care time spent with the patient.





Physical Exam





- Physical Exam


General Appearance: alert


EENT: PERRL/EOMI, ET tube


Neck: non-tender, full range of motion


Respiratory: crackles (Few), other (Tachypneic), No respiratory distress, No 

wheezing


Cardiac/Chest: regular rate, rhythm, systolic murmur


Abdomen: normal bowel sounds, non-tender, soft


Pelvic Exam: deferred


Rectal: deferred


Skin: normal color, warm/dry


Extremities: normal range of motion, non-tender, normal inspection, normal 

capillary refill


Neuro/Psych: alert





ICD10 Worksheet


Patient Problems: 


 Problems











Problem Status Onset


 


Acute blood loss anemia Acute  


 


Coagulopathy Acute  


 


S/P Maze operation for atrial fibrillation Acute  ~12/20/17


 


S/P mitral valve replacement with bioprosthetic valve Acute  ~12/20/17


 


S/P tricuspid valve repair Acute  ~12/20/17


 


Secondary tricuspid regurgitation Acute  


 


Severe mitral regurgitation Acute  


 


s/p placement LV epicardial lead Acute  ~12/20/17


 


Ascending aorta enlargement Chronic  


 


Atrial enlargement, bilateral Chronic  


 


Chronic anticoagulation Chronic  


 


Chronic diastolic congestive heart failure Chronic  


 


HTN (hypertension) Chronic  


 


Paroxysmal atrial fibrillation Chronic  


 


Pulmonary hypertension Chronic

## 2017-12-25 NOTE — GCON
[f rep st]



                                                                    CONSULTATION





ENT CONSULTATION



DATE OF CONSULTATION:  12/25/2017



CHIEF COMPLAINT:  Respiratory failure.



HISTORY OF PRESENT ILLNESS:  Patient is status post mitral replacement, tricuspid repair, and Gusman john
e procedure, with recent history of acute respiratory failure.  The patient has been ventilator depen
dent following 2 trials of extubation that have failed as recently as the 24th.  She seems to be tole
rating the continued intubation and ventilator quite well, but given her 2 failures and length of aurelia
e of intubation, she is being considered for tracheostomy.  I was called in consultation for this.  S
he seems to be otherwise recovering well from her recent surgery.



REVIEW OF SYSTEMS:  HEENT review of systems is negative but for that which is stated above.



ALLERGIES:  Penicillins.



FAMILY HISTORY:  Noncontributory.



PAST MEDICAL HISTORY:  Reviewed.



SOCIAL HISTORY:  Reviewed.



PHYSICAL EXAM:  VITAL SIGNS:  Respiratory rate is 20.  O2 saturation is 100% on 40% FiO2 on humidifie
d ventilator.  GENERAL:  Alert, interactive.  Mild distress secondary to ET tube in place.  GENERAL H
EAD AND FACE:  Normocephalic, atraumatic, with generally symmetric facial features.  EARS:  Bilateral
 pinnae and external canals are unremarkable with no evidence of effusion.  EYES:  EOMI.  NOSE:  Unre
markable anterior rhinoscopy.  Dorsum straight, normally formed.  ORAL CAVITY:  ET tube in place limi
ts oral exam.  NECK:  Supple without palpable mass or adenopathy.  Trachea is midline and readily pal
pable.  Central line at right lateral neck.  NEURO:  Cranial nerves 2-12 appear grossly intact.



ASSESSMENT:  77-year-old female, status post multiple procedures and having since failed 2 attempted 
extubations.  She is clinically appropriate for a tracheostomy.  I discussed with the patient and her
  at bedside the risks, benefits, and alternatives to this procedure.  This long discussion in
cluded the possibility of having another try at extubation.  I noted I had no particular objections t
o this and would defer to Critical Care and Dr. Anglin.  While we discussed this, her main focus was g
etting the tube out of her throat, and she was certainly agreeable to going forward with tracheostomy
, if that were deemed appropriate.  I told her I would discuss this plan with her team some time in t
he morning the 26th.  



Please call me if you have any questions.  My cell phone is [phone].





Job #:  [id number]

## 2017-12-25 NOTE — ECHO
[email or web address]





[address] [address] 

Main: [phone] 



Fax: 



Transthoracic Echocardiogram 

Name:             PHILOMENA CALI                             MR#:

[id number]

Study Date:       2017                               Study Time:

11:22 AM

YOB: 1940                               Age:

77 year(s)

Height:           165.1 cm (65 in.)                        Weight:

60.78 kg (134 lb.)

BSA:              1.67 m2                                  Gender:

Female

Examination:      Echo                                     Indication:

S/P MVR #25 Magna bioprosthesis, TVA #28



Physio ring 

Image Quality:    Adequate                                 Contrast: 

Requested by:     Brian Teague                          BP:

108 mmHg/49 mmHg

Heart Rate:                                                Rhythm:

Normal sinus rhythm

Indication:       S/P MVR #25 Magna bioprosthesis, TVA #28 Physio ring





Procedure Staff 

Ultrasound Technician:   Isabelle Mendes 

Reading Physician:       Bunny Peck 

Requesting Provider: 



Conclusions:           No pericardial effusion. Normal left

ventricular systolic function. Ejection fraction 55%. Concentric left

ventricular hypertrophy. Mitral valve prosthesis with mean gradient of

5 mm of mercury. Mild tricuspid

regurgitation with right ventricular systolic pressure of 35 mm of

mercury. Tricuspid valve ring. Mild

reduction in RV systolic function. 



Measurements: 

Chambers                     Valvular Assessment AV/MV

Valvular Assessment TV/PV



Normal                                    Normal

Normal

Name         Value      Range              Name         Value Range

Name           Value Range

Ao Dolores (MM): 3.0 cm     (2.2 cm-3.7            AV Vmax:     1.75 m/s

(1 m/s-1.7        TV Vmax:       0.92 m/s (0.3 m/s-0.7



cm)                                   m/s)

m/s)

IVSd (2D):   1.1 cm (0.6 cm-1.1                AV maxP mmHg (

- )          TV Vmean:      0.66 m/s ( - )



cm)                LVOT Vmax:   1.26 m/s (0.7 m/s-1.1      TV PGmax:

3 mmHg ( - )

LVDd (2D):   4.8 cm     (3.9 cm-5.3                               m/s)

TV PGmean:     2 mmHg ( - )



cm)                MV meanP mmHg ( - )           TV VTI:

27.00 cm ( - )

LVDs (2D):   3.3 cm     (2.1 cm-4              MV PHT:      0.147 s (

- )          TR Vmax:       2.73 mm/s ( - )



cm)                MVA (PHT):   1.5 s ( - )            TR PGmax:

30 mmHg ( - )

LVPWd (2D):  1.0 cm     ( - )

PV Vmax:       0.94 m/s (0.6 m/s-0.9

LVEF (BP):   55 %       (>=55 %)

m/s)

RVDd(2D):    2.9 cm     (1.9 cm-3.8

PV PGmax:      4  mmHg ( - )



cmmm)  



Continued Measurements: 

Valvular Assessment AV/MV  



Name                     Value  

MV VTI:                45.40 cm    



Additional Vessels  

Patient: PHILOMENA CALI                          MRN: [id number]

Study Date: 2017   Page 1 of 2



11:22 AM 









Name                      Value  

Ao Ascending:            3.1 cm    



Findings:              Left Ventricle: 

Normal size left ventricle. Borderline concentric LV hypertrophy.

Normal global systolic LV function.

EF is 55 %. No regional wall motion abnormality. Unable to assess

diastolic dysfunction.

Right Ventricle: 

Normal size right ventricle. Mildly reduced RV function.  

Left Atrium: 

Left atrium is dilated.  

Right Atrium: 

The right atrium is normal in size.  

Mitral Valve: 

A bioprothetic mitral valve is in place. Trivial MV prosthesis

regurgitation. Mean gradient across the

MVR 5mmHg.  

Aortic Valve: 

The aortic valve is tri-leaflet and functions normally. Aortic

sclerosis is present.

Tricuspid Valve: 

Mild tricuspid regurgitation is present. Right ventricular systolic

pressure measures 35mmHg. The

pulmonary artery pressure is mildly increased. There is a tricuspid

valve ring.

Pulmonic Valve: 

The pulmonic valve is normal in appearance and function.  

Aorta: 

Normal size aortic root measuring 3.0 cm. Normal size ascending aorta

measuring 3.1 cm.

IVC: 

The IVC is not visualized.  

Pericardium: 

No pericardial effusion. 







Electronically signed by Bunny Peck on 2017 at 01:40 PM 

(No Signature Object) 



Patient: PHILOMENA CALI                          MRN: [id number]

Study Date: 2017   Page 2 of 2

11:22 AM 







[id number]

## 2017-12-25 NOTE — PDINTPN
Intensivist Progress Note


Assessment/Plan: 


Assessment/plan:


* Status post mitral replacement, tricuspid repair, and Gusman Maze procedure


* Acute respiratory failure-patient extubated yesterday morning did well 

throughout the day, but then failed early evening and was reintubated.  


      -will attempt extubation today.  She may need tracheostomy


* Paralyzed left hemidiaphragm by history


* Ambulation


* Atrial fibrillation-back in sinus.  On amiodarone


* Hypothyroidism


* Hypertension-stable


* Raynaud's


* VTE prophylaxis-continue SCDs.  Hold anticoagulation for now


* Stress ulcer prophylaxis


* Nutrition-now on tube feed


* Sedation-stable  Currently on low-dose Precedex








Case discussed with Nursing and Respiratory therapy








Subjective: 





Sitting up in chair on mechanical ventilation.  Comfortable.


Objective: 





 Vital Signs











Temp Pulse Resp BP Pulse Ox


 


 38.8 C H  60   20   98/45 L  100 


 


 12/25/17 04:00  12/25/17 08:04  12/25/17 08:04  12/25/17 06:00  12/25/17 08:04








 Microbiology











 12/24/17 15:15  - Final





 Sputum, Induced/Suctioned 








 Laboratory Results





 12/25/17 05:50 





 12/25/17 05:50 





 











 12/24/17 12/25/17 12/26/17





 05:59 05:59 05:59


 


Intake Total 2608 2915.1 


 


Output Total 2220 2705 


 


Balance 388 210.1 








 











PT  16.2 SEC (12.0-15.0)  H  12/25/17  05:50    


 


INR  1.28  (0.83-1.16)  H  12/25/17  05:50    











 Laboratory Results





 12/25/17 05:50 





 12/25/17 05:50 





 











  12/25/17





  05:50


 


PT  16.2 SEC H SEC





  (12.0 - 15.0)


 


INR  1.28  H 





  (0.83 - 1.16)














- Time Spent With Patient


Time Spent With Patient: 





35 min of time spent with patient, over 1/2 of which involved coordination of 

care or counseling





Physical Exam





- Physical Exam


General Appearance: alert, no apparent distress


EENT: PERRL/EOMI, ET tube


Neck: non-tender


Respiratory: crackles (Few left), No respiratory distress, No accessory muscle 

use, No wheezing


Cardiac/Chest: normal peripheral pulses, regular rate, rhythm, systolic murmur


Peripheral Pulses: 2+: carotid (R), carotid (L), femoral (R), femoral (L), 

dorsalis-pedis (R), dorsalis-pedis (L)


Abdomen: normal bowel sounds, non-tender, soft


Pelvic Exam: deferred


Rectal: deferred


Skin: normal color, warm/dry


Extremities: normal range of motion, non-tender, normal inspection, normal 

capillary refill


Neuro/Psych: alert





ICD10 Worksheet


Patient Problems: 


 Problems











Problem Status Onset


 


Acute blood loss anemia Acute  


 


Coagulopathy Acute  


 


S/P Maze operation for atrial fibrillation Acute  ~12/20/17


 


S/P mitral valve replacement with bioprosthetic valve Acute  ~12/20/17


 


S/P tricuspid valve repair Acute  ~12/20/17


 


Secondary tricuspid regurgitation Acute  


 


Severe mitral regurgitation Acute  


 


s/p placement LV epicardial lead Acute  ~12/20/17


 


Ascending aorta enlargement Chronic  


 


Atrial enlargement, bilateral Chronic  


 


Chronic anticoagulation Chronic  


 


Chronic diastolic congestive heart failure Chronic  


 


HTN (hypertension) Chronic  


 


Paroxysmal atrial fibrillation Chronic  


 


Pulmonary hypertension Chronic

## 2017-12-25 NOTE — SOAPPROG
SOAP Progress Note


Assessment/Plan: 


POD #5 Redo median sternotomy, explant old MV ring, implant #25 Magna mitral 

bioprosthesis, TVA #28 Anderson MC3 ring, CoxMaze IV, placement LV epicardial 

lead tunneled to left subclavicular space





Severe MR/failed MV rpr - Ring exchanged for tissue MVR. Antithrombotic 

prophylaxis as per CM 4.





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 Antithrombotic prophylaxis as per 

CM 4.





Paroxysmal atrial fib/flutter s/p CM 4 -  Currently in SR. Will resume 

antithrombotic prophylaxis with Coumadin when appropriate. AF prophylaxis with 

only amiodarone for now. BB/CCB as tolerated.





Class II Chronic dCHF  Continue BID Lasix. BB when appropriate. 





Ventilator dependent respiratory failure - Failed second extubation. Plan for 

tracheostomy as per Dr. Anglin. 





Acute blood loss anemia with thrombocytopenia and coagulopathy - s/p multiple 

blood product transfusions now with serosanguineous drainage. 





DVT prophylaxis - continue SCDs and heparin SQ.  





Lines/drains - PICC ordered for 12/25. Will remove TLC after. FC and AL to be 

removed today. Drains likely to be removed 12/26. Pacing wires capped.   





Nutrition - tube feeding at 60 cc/h with free water 150 cc q4h 


Subjective: 


Comfortable.


Objective: 





 Vital Signs











Temp Pulse Resp BP Pulse Ox


 


 38.8 C H  60   15   98/45 L  100 


 


 12/25/17 04:00  12/25/17 06:00  12/25/17 06:00  12/25/17 06:00  12/25/17 06:00








 Microbiology











 12/24/17 15:15  - Final





 Sputum, Induced/Suctioned 








 Laboratory Results





 12/25/17 05:50 





 12/25/17 05:50 





 











 12/24/17 12/25/17 12/26/17





 05:59 05:59 05:59


 


Intake Total 2608 2915.1 


 


Output Total 2220 2705 


 


Balance 388 210.1 








 











PT  16.2 SEC (12.0-15.0)  H  12/25/17  05:50    


 


INR  1.28  (0.83-1.16)  H  12/25/17  05:50    














Physical Exam





- Physical Exam


General Appearance: WD/WN, alert, no apparent distress


EENT: ET tube


Neck: normal inspection


Respiratory: No respiratory distress


Cardiac/Chest: regular rate, rhythm


Abdomen: non-tender, soft, No distended


Skin: normal color, warm/dry


Neuro/Psych: no motor/sensory deficits, alert, normal mood/affect





ICD10 Worksheet


Patient Problems: 


 Problems











Problem Status Onset


 


Acute blood loss anemia Acute  


 


Coagulopathy Acute  


 


S/P Maze operation for atrial fibrillation Acute  ~12/20/17


 


S/P mitral valve replacement with bioprosthetic valve Acute  ~12/20/17


 


S/P tricuspid valve repair Acute  ~12/20/17


 


Secondary tricuspid regurgitation Acute  


 


Severe mitral regurgitation Acute  


 


s/p placement LV epicardial lead Acute  ~12/20/17


 


Ascending aorta enlargement Chronic  


 


Atrial enlargement, bilateral Chronic  


 


Chronic anticoagulation Chronic  


 


Chronic diastolic congestive heart failure Chronic  


 


HTN (hypertension) Chronic  


 


Paroxysmal atrial fibrillation Chronic  


 


Pulmonary hypertension Chronic

## 2017-12-26 NOTE — SOAPPROG
SOAP Progress Note


Assessment/Plan: 


Assessment: Failure to extubate.  Appropriate candidate for tracheostomy.  Jump 

in white blood cell count noted. Given new consolidation in chest x-ray at left

, I am somewhat concerned she may be developing a pneumonia.








Plan:  As scheduled the patient for tracheostomy at 18:30 on December 26, 2017. 

I would still defer to critical care and her surgeon to see if they want to 

attempt extubation again prior to tracheostomy and also treat for possible 

pneumonia.  Certainly the procedure could be deferred for a day or 2 if needed.

  Please call with any questions:  [phone].





12/26/17 10:05





12/26/17 10:09





Subjective: 





Evaluated patient last night.  This notice to serve as an addendum to last 

nights note.


Objective: 





 Vital Signs











Temp Pulse Resp BP Pulse Ox


 


 37.1 C   103 H  15   119/59 L  94 


 


 12/26/17 08:00  12/26/17 10:00  12/26/17 10:00  12/26/17 10:00  12/26/17 10:00








 Microbiology











 12/24/17 15:15  - Final





 Sputum, Induced/Suctioned 








 Laboratory Results





 12/26/17 04:00 





 12/26/17 04:00 





 











 12/25/17 12/26/17 12/27/17





 05:59 05:59 05:59


 


Intake Total 2915.1 1914 


 


Output Total 2705 2450 


 


Balance 210.1 -536 








 











PT  14.9 SEC (12.0-15.0)   12/26/17  04:00    


 


INR  1.15  (0.83-1.16)   12/26/17  04:00    














- Pending Discharge


Pending Discharge Within 24 Hours: No


Pending Discharge Within 48 Hours: No





ICD10 Worksheet


Patient Problems: 


 Problems











Problem Status Onset


 


Acute blood loss anemia Acute  


 


Coagulopathy Acute  


 


S/P Maze operation for atrial fibrillation Acute  ~12/20/17


 


S/P mitral valve replacement with bioprosthetic valve Acute  ~12/20/17


 


S/P tricuspid valve repair Acute  ~12/20/17


 


Secondary tricuspid regurgitation Acute  


 


Severe mitral regurgitation Acute  


 


s/p placement LV epicardial lead Acute  ~12/20/17


 


Ascending aorta enlargement Chronic  


 


Atrial enlargement, bilateral Chronic  


 


Chronic anticoagulation Chronic  


 


Chronic diastolic congestive heart failure Chronic  


 


HTN (hypertension) Chronic  


 


Paroxysmal atrial fibrillation Chronic  


 


Pulmonary hypertension Chronic

## 2017-12-26 NOTE — ASMTCMCOM
CM Note

 

CM Note                       

Notes:

Patient's family contacted by  for "Family Meeting" unable to meet today but could meet at 

12:00 on Thursday. Daughter reports that her [phone], but 

will be out-of-town. She reports that her mother is highly motivated. Patient up walking with 

vent. PT recommending SNF; OT-HC. CM to follow for discharge needs.

 

Date Signed:  12/26/2017 03:43 PM

Electronically Signed By:Poonam Dawkins LCSW

## 2017-12-26 NOTE — PDANEPAE
ANE History of Present Illness





78 yo for trach


resp failure s/p mvr maze





ANE Past Medical History





- Cardiovascular History


Hx Hypertension: Yes


Hx Arrhythmias: Yes


Hx Chest Pain: No


Hx Coronary Artery / Peripheral Vascular Disease: No


Hx CHF / Valvular Disease: Yes


Hx Palpitations: No


Cardiovascular History Comment: afib.  hx of MVR 2011.  hx of dvt.  

hyperlipidemia





- Pulmonary History


Hx COPD: No


Hx Asthma/Reactive Airway Disease: No


Hx Recent Upper Respiratory Infection: No


Hx Oxygen in Use at Home: No


Hx Sleep Apnea: No


Sleep Apnea Screening Result - Last Documented: Negative


Pulmonary History Comment: hypoxia r/t sleep





- Neurologic History


Hx Cerebrovascular Accident: Yes


Hx Seizures: No


Hx Dementia: No


Neurologic History Comment: TIA 2010





- Endocrine History


Hx Diabetes: No


Endocrine History Comment: hypothyroidism





- Renal History


Hx Renal Disorders: No





- Liver History


Hx Hepatic Disorders: No





- Neurological & Psychiatric Hx


Hx Neurological and Psychiatric Disorders: No





- Cancer History


Hx Cancer: Yes


Cancer History Comment: squamous cell-mohs procedure





- Congenital Disorder History


Hx Congenital Disorders: No





- GI History


Hx Gastrointestinal Disorders: Yes


Gastrointestinal History Comment: hemorrhoids





- Other Health History


Other Health History: wears glasses.  macular degeneration.  lupus





- Chronic Pain History


Chronic Pain: No





- Surgical History


Prior Surgeries: hysterectomy 1999-ish.  tubal ligation 1984.  MVR 2001.  mohs 

procedure





ANE Review of Systems


Review of Systems: 








- Exercise capacity


METS (RN): 3 METS





- Pacemaker


Pacemaker Set Rate: 48





ANE Patient History





- Allergies


Allergies/Adverse Reactions: 








Penicillins Allergy (Severe, Verified 12/20/17 07:29)


 Hives








- Home Medications


Home Medications: 








Metoprolol Tartrate [Lopressor 100 mg (*)] 100 mg PO BID 05/31/16 [Last Taken 12 /19/17]


Warfarin Sodium [Coumadin 5MG (*)] 5 mg PO SUMOWEFRSA@16 10/18/17 [Last Taken 12 /13/17]


Zolpidem Tartrate [Ambien 5MG (*)] 5 mg PO HS PRN 10/18/17 [Last Taken 12/17/17]


rOPINIRole HCL [Requip 0.25mg (RX)] 0.25 mg PO HS PRN 10/18/17 [Last Taken 12/17 /17]


Furosemide [Lasix 20 MG (*)] 60 mg PO DAILY 12/08/17 [Last Taken 12/18/17]


Potassium Cl [Klor-Con 20 meq (*)] 20 meq PO BID 12/08/17 [Last Taken 12/18/17]


Warfarin Sodium [Coumadin 5MG (*)] 2.5 mg PO TUTH@16 12/08/17 [Last Taken 12/12/ 17]


amLODIPine BESYLATE [Norvasc 5 mg (*)] 5 mg PO DAILY PRN 12/08/17 [Last Taken 12 /17/17]


Enoxaparin [Lovenox 60 MG (*)] 60 mg SQ BID 12/19/17 [Last Taken 12/18/17 09:00]








- NPO status


NPO Since - Liquids (Date): 12/20/17


NPO Since - Liquids (Time): 00:01


NPO Since - Solids (Date): 12/20/17


NPO Since - Solids (Time): 00:01





- Smoking Hx


Smoking Status: Never smoked





- Family Anes Hx


Family Hx Anesthesia Complications: NONE





ANE Labs/Vital Signs





- Labs


Result Diagrams: 


 12/26/17 04:00





 12/26/17 12:15





- Vital Signs


Blood Pressure: 101/51


Heart Rate: 105


Respiratory Rate: 14


O2 Sat (%): 95


Height: 5 ft 4.96 in


Weight: 65 kg





ANE Physical Exam





- Airway


Mallampati Score: Unable to assesss


Mouth exam: ETT in situ





- Pulmonary


Pulmonary: no respiratory distress





- Cardiovascular


Cardiovascular: regular rate and rhythym





- ASA Status


ASA Status: III





ANE Anesthesia Plan


Anesthesia Plan: general endotracheal anesthesia

## 2017-12-26 NOTE — PDINTPN
Intensivist Progress Note


Assessment/Plan: 


Assessment/plan:








77 F s/p MV redo with Maze procedure complicated by recurrent (known) PAF and 

respiratory failure. 





* Acute Respiratory failure with hypoxia likely 2/2 advanced age with left 

diaphragmatic dysfunction and failed extubation on two occasions. Planning OR 

trach by ENT later today with possible PEG at later date. Hope to tolerate 

weaning once trach in place. Cannot rule out contribution from PNA as well 

given elevated WBC. Started cefepime today in response to GNR in sputum 

culture. No bronch indicated at this time. 


* FEN- reassess after trach. Would probable consider a DHT prior to PEG to 

assist with nutritional goals until she can swallow


* discussed with Dr. Anglin and Kate Byrne




















Subjective: 


No complaints


Objective: 





 Vital Signs











Temp Pulse Resp BP Pulse Ox


 


 37.0 C   112 H  16   110/45 L  96 


 


 12/26/17 12:00  12/26/17 14:00  12/26/17 14:00  12/26/17 14:00  12/26/17 14:00








 Microbiology











 12/24/17 15:15  - Final





 Sputum, Induced/Suctioned 








 Laboratory Results





 12/26/17 04:00 





 12/26/17 12:15 





 











 12/25/17 12/26/17 12/27/17





 05:59 05:59 05:59


 


Intake Total 2915.1 1914 


 


Output Total 2705 2450 450


 


Balance 210.1 -536 -450








 











PT  14.9 SEC (12.0-15.0)   12/26/17  04:00    


 


INR  1.15  (0.83-1.16)   12/26/17  04:00    














Physical Exam





- Physical Exam


General Appearance: alert, no apparent distress


EENT: PERRL/EOMI, ET tube


Neck: supple


Respiratory: lungs clear, normal breath sounds, decreased breath sounds, No 

respiratory distress


Cardiac/Chest: regular rate, rhythm, No edema


Abdomen: non-tender, soft, No distended


Skin: normal color, warm/dry


Lymphatic: no adenopathy


Extremities: No pedal edema


Neuro/Psych: alert, normal mood/affect, oriented x 3





ICD10 Worksheet


Patient Problems: 


 Problems











Problem Status Onset


 


Acute blood loss anemia Acute  


 


Coagulopathy Acute  


 


S/P Maze operation for atrial fibrillation Acute  ~12/20/17


 


S/P mitral valve replacement with bioprosthetic valve Acute  ~12/20/17


 


S/P tricuspid valve repair Acute  ~12/20/17


 


Secondary tricuspid regurgitation Acute  


 


Severe mitral regurgitation Acute  


 


s/p placement LV epicardial lead Acute  ~12/20/17


 


Ascending aorta enlargement Chronic  


 


Atrial enlargement, bilateral Chronic  


 


Chronic anticoagulation Chronic  


 


Chronic diastolic congestive heart failure Chronic  


 


HTN (hypertension) Chronic  


 


Paroxysmal atrial fibrillation Chronic  


 


Pulmonary hypertension Chronic

## 2017-12-26 NOTE — SOAPPROG
SOAP Progress Note


Assessment/Plan: 


Assessment: POD#6 Redo median sternotomy, explant old MV ring, implant #25 

Magna mitral bioprosthesis, TVA #28 Anderson MC3 ring, CoxMaze IV, placement LV 

epicardial lead tunneled to left subclavicular space


LUE PICC


TFs @ 60cc/h, free water 150cc q 4h. On hold pending trach.





Severe MR/failed MV rpr - Ring exchanged for tissue MVR. Antithrombotic 

prophylaxis with Coumadin as per existing parameters for PAF.





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 Antithrombotic prophylaxis as per 

MVR.





Paroxysmal atrial fib/flutter, chronically anticoagulated with Coumadin - 

Predominant post CM4 rhythm SR with occ SB and PAF. No sustained backup pacing. 

Remains at high risk of conduction abnormalities given double valve, maze. AF 

prophylaxis with amiodarone. Resumption of Coumadin initiated.





Chronic dCHF - Class II on maximal medical therapy. Off CPB without inotropic 

or pressor support. Active diuresis of significant volume overload in progress.





Ventilator dependent respiratory failure - s/p 2 trials of extubation. Chest 

tubes out. Tracheostomy per ENT planned later today. Probable PEG next 48hrs.





Acute expected blood loss anemia with thrombocytopenia and coagulopathy - 

Exacerbated by lovenox bridge and CPB. Refractory to fairly significant 

correction with blood and blood products. Ultimately responsive to Factor VII. 

VTE prophylaxis and Coumadin resumed without incident.








Plan:


Vent and TFs, ? IV Abx for GNR in sputum per intensivist.


Trach per ENT.


Cont VVI backup capacity. Cut wires tomorrow if rhythm stable.


Cont Coumadin 2.5 mg today.


Cont increased activity as tolerated.








12/26/17 07:23

















Subjective: 





Awake and alert on vent. Comfortable from pain standpoint. Walked yest w PT. 

Morning walk missed d/t PICC placement. Eager for ETT out.


Objective: 





 Vital Signs











Temp Pulse Resp BP Pulse Ox


 


 36.6 C   97   20   162/81 H  97 


 


 12/26/17 00:00  12/26/17 02:00  12/26/17 00:00  12/26/17 02:00  12/26/17 02:00








 Microbiology











 12/24/17 15:15  - Final





 Sputum, Induced/Suctioned 








 Laboratory Results





 12/26/17 04:00 





 12/26/17 04:00 





 











 12/25/17 12/26/17 12/27/17





 05:59 05:59 05:59


 


Intake Total 2915.1 1914 


 


Output Total 2705 2450 


 


Balance 210.1 -536 








 











PT  14.9 SEC (12.0-15.0)   12/26/17  04:00    


 


INR  1.15  (0.83-1.16)   12/26/17  04:00    








In and out of AF with CVR.


No hypotension.


Stable ABG on FIO2 40%.


CXR: Inc LLL opacity, effusion vs atelectasis.


Balanced I/Os.


INR yet to rise.


Inc WBC, possible PNA. Prelim sputum cx +GNR. Blood cx pending.














Physical Exam





- Physical Exam


General Appearance: alert, no apparent distress


Respiratory: lungs clear (anteriorly)


Cardiac/Chest: regular rate, rhythm, other (Sternum grossly stable. Sternotomy 

CDI. Chest tube dressing CDI.)


Abdomen: normal bowel sounds, non-tender, soft


Skin: warm/dry


Extremities: other (no visible peripheral edema)





ICD10 Worksheet


Patient Problems: 


 Problems











Problem Status Onset


 


s/p placement LV epicardial lead Acute  ~12/20/17


 


HTN (hypertension) Chronic  


 


Chronic diastolic congestive heart failure Chronic  


 


Coagulopathy Acute  


 


Acute blood loss anemia Acute  


 


S/P Maze operation for atrial fibrillation Acute  ~12/20/17


 


S/P tricuspid valve repair Acute  ~12/20/17


 


S/P mitral valve replacement with bioprosthetic valve Acute  ~12/20/17


 


Chronic anticoagulation Chronic  


 


Paroxysmal atrial fibrillation Chronic  


 


Pulmonary hypertension Chronic  


 


Ascending aorta enlargement Chronic  


 


Atrial enlargement, bilateral Chronic  


 


Secondary tricuspid regurgitation Acute  


 


Severe mitral regurgitation Acute

## 2017-12-26 NOTE — GOP
[f rep st]



                                                                OPERATIVE REPORT





DATE OF OPERATION:  



SURGEON:  Chris Webb MD



ANESTHESIA:  General.



PREOPERATIVE DIAGNOSIS:  Respiratory failure.



POSTOPERATIVE DIAGNOSIS:  Respiratory failure.



PROCEDURE PERFORMED:  Tracheostomy.



FINDINGS:  Neck anatomy largely unremarkable.



SPECIMENS:  None.



ESTIMATED BLOOD LOSS:  10 mL.



INDICATIONS:  The patient was seen in the ICU and was having difficulty weaning off ventilation.  She
 failed 2 extubations.  Given the extended intubation time and her comorbidities, she was determined 
to be an appropriate candidate for the above-stated procedure.  The risks, benefits, and alternatives
 to the procedure were explained at length to the patient who stated she understood and wished to go 
forward with the procedure.



DESCRIPTION OF PROCEDURE:  The patient was brought to the operating room by anesthesiologist and plac
ed on the operative operating table.  Once the appropriate level of anesthesia was achieved, the supr
asternal central neck was injected with 8 mL of 1% lidocaine with 1:100,000 epinephrine.  The patient
 was then prepped and draped in the usual fashion.  The neck was marked at the suprasternal notch, th
e cricoid, and the laryngeal prominence.  An incision was created with Bovie electrocautery 1.5 cm ab
ove the suprasternal notch.  This was created somewhat superiorly in order to avoid prior sternotomy 
incision in operative site.  The incision was carried through the platysma.  A subplatysmal flap was 
elevated.  The horizontal central neck incision was widened to approximately 4 cm.  With the subplaty
smal flap elevated, a tractor was placed and the strap muscles were divided at the median raphe using
 Bovie electrocautery.  Dissection continued down to the anterior wall of the trachea.  The strap mus
cles were dissected off the trachea using a combination of blunt finger dissection and Kittner.  Bipo
lar cautery was used for hemostasis.  The dissection continued superiorly to the cricoid.  The trache
a was then inspected for bleeding.  Bipolar was able to achieve hemostasis without difficulty.  Just 
beneath the 3rd ring, a 15 blade was used to create a horizontal incision in the trachea.  The cuff o
n the ET tube was deflated and, prior to the ET tube being withdrawn, 3-0 Prolene stay sutures were p
laced around the 3rd and the 4th tracheal rings.  The ET tube was withdrawn and a  was used t
o open the tracheal incision.  Following this, a tested 7 cuffed tracheostomy tube was placed and the
 cuff was inflated.  After insufflation, the ventilatory circuit was attached.  There was good return
 of CO2.  The tracheostomy tube was sutured in place at its flanges with 4 interrupted sutures.  A Xe
roform was placed circumferentially around the base of the tube within the wound site.  A drain spong
e was then placed.  Following that, a Velcro trach tie was placed.  The patient tolerated the procedu
re well and was transferred on ventilation to the intensive care unit.



COMPLICATIONS:  None.





Job #:  [id number]

## 2017-12-27 NOTE — CPEKG
Heart Rate: 88

RR Interval: 682

P-R Interval: 184

QRSD Interval: 122

QT Interval: 412

QTC Interval: 499

P Axis: 15

QRS Axis: 122

T Wave Axis: -7

EKG Severity - ABNORMAL ECG -

EKG Impression: SINUS RHYTHM

EKG Impression: ABERRANT COMPLEX

EKG Impression: RBBB AND LPFB

Electronically Signed By: Anthony Saucedo 27-Dec-2017 14:25:13

## 2017-12-27 NOTE — PDINTPN
Intensivist Progress Note


Assessment/Plan: 


Assessment/plan:








77 F s/p MV redo with Maze procedure complicated by recurrent (known) PAF and 

respiratory failure. 





* Acute Respiratory failure with hypoxia likely 2/2 advanced age with left 

diaphragmatic dysfunction and failed extubation on two occasions. Cannot rule 

out contribution from PNA as well given elevated WBC. Started cefepime 12/26 in 

response to GNR in sputum culture. No bronch indicated at this time. Will 

attempt trach collar weans today as well continue with aggressive pulmonary 

toilet


* FEN- DHT placed today


* discussed with Dr. Anglin and Kate Byrne




















12/27/17 11:21





Subjective: 





stable overnight, s/p trach in OR


Objective: 





 Vital Signs











Temp Pulse Resp BP Pulse Ox


 


 37.1 C   127 H  21 H  95/69 L  97 


 


 12/27/17 08:00  12/27/17 08:33  12/27/17 08:33  12/27/17 08:00  12/27/17 08:33








 Microbiology











 12/24/17 15:15  - Final





 Sputum, Induced/Suctioned Sputum Culture - Final





    Escherichia Coli








 Laboratory Results





 12/27/17 05:35 





 12/27/17 05:35 





 











 12/26/17 12/27/17 12/28/17





 05:59 05:59 05:59


 


Intake Total 1914 84 


 


Output Total 2450 1350 


 


Balance -536 -1266 








 











PT  18.6 SEC (12.0-15.0)  H  12/27/17  05:35    


 


INR  1.54  (0.83-1.16)  H  12/27/17  05:35    














Physical Exam





- Physical Exam


General Appearance: alert, no apparent distress


EENT: PERRL/EOMI


Neck: supple, other (oozing at trach site)


Respiratory: lungs clear, decreased breath sounds, No respiratory distress, No 

accessory muscle use


Cardiac/Chest: tachycardia, No edema


Abdomen: non-tender, soft, No distended


Skin: normal color, warm/dry, No cyanosis


Lymphatic: no adenopathy


Extremities: No pedal edema


Neuro/Psych: alert, normal mood/affect, oriented x 3





ICD10 Worksheet


Patient Problems: 


 Problems











Problem Status Onset


 


Acute blood loss anemia Acute  


 


Coagulopathy Acute  


 


S/P Maze operation for atrial fibrillation Acute  ~12/20/17


 


S/P mitral valve replacement with bioprosthetic valve Acute  ~12/20/17


 


S/P tricuspid valve repair Acute  ~12/20/17


 


Secondary tricuspid regurgitation Acute  


 


Severe mitral regurgitation Acute  


 


s/p placement LV epicardial lead Acute  ~12/20/17


 


Ascending aorta enlargement Chronic  


 


Atrial enlargement, bilateral Chronic  


 


Chronic anticoagulation Chronic  


 


Chronic diastolic congestive heart failure Chronic  


 


HTN (hypertension) Chronic  


 


Paroxysmal atrial fibrillation Chronic  


 


Pulmonary hypertension Chronic

## 2017-12-27 NOTE — POSTANESTH
Post Anesthetic Evaluation


Cardiovascular Status: Normal, Stable


Respiratory Status: Similar to Pre-op Cond.


Level of Consciousness/Mental Status: Moderately Sleepy


Pain Control: Adequate, Prn Tx Ordered


Nausea/Vomiting Control: Adequate, Prn Tx Ordered


Complications Possibly Related to Anesthesia: None Noted

## 2017-12-27 NOTE — SOAPPROG
SOAP Progress Note


Assessment/Plan: 


POD #7: redo median sternotomy, explant old MV ring, implant #25 Magna mitral 

bioprosthesis, TVA #28 Anderson MC3 ring, CoxMaze IV, placement LV epicardial 

lead tunneled to left subclavicular space





POD #1: open tracheostomy placement 





Severe MR/failed MV rpr - Ring exchanged for tissue MVR. Antithrombotic 

prophylaxis as per CM 4.





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 Antithrombotic prophylaxis as per 

CM 4.





Paroxysmal atrial fib/flutter s/p CM 4 -  continue amiodarone. BB/CCB as 

tolerated. Thromboprophylaxis with SQ Lovenox while deciding on need for PEG.  





Class II Chronic dCHF  Continue Lasix/BB. 





Ventilator dependent respiratory failure - s/p trach placement. Weaning as 

pulmonology.


 


Acute blood loss anemia with resolved thrombocytopenia and coagulopathy - 

stable. 





DVT prophylaxis - continue SCDs and Lovenox.  





Lines/drains - RUE PICC. Pacing wires (to be cut today).   





Nutrition - via NGT.








Subjective: 


Denies pain. Comfortable.


Objective: 





 Vital Signs











Temp Pulse Resp BP Pulse Ox


 


 36.8 C   141 H  20   108/77   97 


 


 12/27/17 04:00  12/27/17 06:00  12/27/17 06:00  12/27/17 06:00  12/27/17 06:00








 Microbiology











 12/24/17 15:15  - Final





 Sputum, Induced/Suctioned Sputum Culture - Final





    Escherichia Coli








 Laboratory Results





 12/27/17 05:35 





 12/27/17 05:35 





 











 12/26/17 12/27/17 12/28/17





 05:59 05:59 05:59


 


Intake Total 1914 84 


 


Output Total 2450 1350 


 


Balance -536 -1266 








 











PT  18.6 SEC (12.0-15.0)  H  12/27/17  05:35    


 


INR  1.54  (0.83-1.16)  H  12/27/17  05:35    














Physical Exam





- Physical Exam


General Appearance: alert, no apparent distress


EENT: other (trach in place)


Respiratory: No respiratory distress


Cardiac/Chest: tachycardia, irregularly irregular


Abdomen: non-tender, soft, No distended


Skin: normal color, warm/dry


Extremities: No pedal edema


Neuro/Psych: no motor/sensory deficits, alert, normal mood/affect





ICD10 Worksheet


Patient Problems: 


 Problems











Problem Status Onset


 


Acute blood loss anemia Acute  


 


Coagulopathy Acute  


 


S/P Maze operation for atrial fibrillation Acute  ~12/20/17


 


S/P mitral valve replacement with bioprosthetic valve Acute  ~12/20/17


 


S/P tricuspid valve repair Acute  ~12/20/17


 


Secondary tricuspid regurgitation Acute  


 


Severe mitral regurgitation Acute  


 


s/p placement LV epicardial lead Acute  ~12/20/17


 


Ascending aorta enlargement Chronic  


 


Atrial enlargement, bilateral Chronic  


 


Chronic anticoagulation Chronic  


 


Chronic diastolic congestive heart failure Chronic  


 


HTN (hypertension) Chronic  


 


Paroxysmal atrial fibrillation Chronic  


 


Pulmonary hypertension Chronic

## 2017-12-28 NOTE — SOAPPROG
SOAP Progress Note


Assessment/Plan: 


Assessment: POD#8 Redo median sternotomy, explant old MV ring, implant #25 

Magna mitral bioprosthesis, TVA #28 Anderson MC3 ring, CoxMaze IV, placement LV 

epicardial lead tunneled to left subclavicular space


POD#2 tracheostomy


RUE PICC


DHT TFs @ 55cc/h, free water 50cc q 4h. 


D2 IV Cefepime





Severe MR/failed MV rpr - Ring exchanged for tissue MVR. Antithrombotic 

prophylaxis with Coumadin as per existing parameters for PAF.





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 Antithrombotic prophylaxis as per 

MVR.





Paroxysmal atrial fib/flutter, chronically anticoagulated with Coumadin - 

Predominant post CM4 rhythm sinus with intermittent PAF. Conduction intact and 

TCPWs clipped. AF prophylaxis with amiodarone. Adjunctive rate control with BB. 

Anticoagulation switched to lovenox bridge pending decision PEG.





Chronic dCHF - Class II on maximal medical therapy. Off CPB without inotropic 

or pressor support. Active diuresis of significant volume overload in progress.





Ventilator dependent respiratory failure - Trach placed s/p 2 failed trials of 

extubation. Paralyzed left hemidiaphragm (abnormal pericardial anatomy) and 

bronchitis (possible LLL PNA) suspected. Chest tubes out. Vent wean per 

pulmonology. Need for PEG as yet unclear.





Acute expected blood loss anemia with coagulopathy - Exacerbated by lovenox 

bridge and CPB. Refractory to fairly significant correction with blood and 

blood products. Ultimately responsive to Factor VII. Thrombocytopenia resolved. 

Anticoag resumed without incident.








Plan:


Cont therapeutic lovenox.


Cont Amio 200 mg daily. 


Inc metoprolol tartrate to 37.5 mg BID.


D5W @ 75 cc/h x 1 liter.


Consider inc free water flushes. 


Relax diuresis tomorrow.


Cont increased activity as tolerated.











12/28/17 07:27











Subjective: 





Awake and alert, sitting in a chair. Communicating with note pad. C/o reflux, o/

w comfortable. Eager to do all she can to get better. Looking forward to a walk.


Objective: 





 Vital Signs











Temp Pulse Resp BP Pulse Ox


 


 36.8 C   98   20   164/83 H  98 


 


 12/28/17 04:00  12/28/17 06:00  12/28/17 06:00  12/28/17 06:00  12/28/17 06:00








 Laboratory Results





 12/28/17 05:52 





 12/28/17 05:52 





 











 12/27/17 12/28/17 12/29/17





 05:59 05:59 05:59


 


Intake Total 84 1000 


 


Output Total 1350 830 


 


Balance -1266 170 








 











PT  18.6 SEC (12.0-15.0)  H  12/27/17  05:35    


 


INR  1.54  (0.83-1.16)  H  12/27/17  05:35    








SR/ST with upward creeping BP.


Resp arrest yest w elev CO2. Now satting well on BiPAP, 40-60% FIO2.


Adequate fluid balance. Approaching admit wt.


WBC inc likely reactive to yest resp decomp. 


Elev Na sugg intravasc dry.





Physical Exam





- Physical Exam


General Appearance: alert, no apparent distress


Respiratory: lungs clear (grossly)


Cardiac/Chest: regular rate, rhythm, other (Sternum grossly stable. Sternotomy 

and CT sites healing well.)


Abdomen: non-tender, soft


Skin: warm/dry


Extremities: swelling (trace dependent)





ICD10 Worksheet


Patient Problems: 


 Problems











Problem Status Onset


 


Acute blood loss anemia Acute  


 


Coagulopathy Acute  


 


S/P Maze operation for atrial fibrillation Acute  ~12/20/17


 


S/P mitral valve replacement with bioprosthetic valve Acute  ~12/20/17


 


S/P tricuspid valve repair Acute  ~12/20/17


 


Secondary tricuspid regurgitation Acute  


 


Severe mitral regurgitation Acute  


 


s/p placement LV epicardial lead Acute  ~12/20/17


 


Ascending aorta enlargement Chronic  


 


Atrial enlargement, bilateral Chronic  


 


Chronic anticoagulation Chronic  


 


Chronic diastolic congestive heart failure Chronic  


 


HTN (hypertension) Chronic  


 


Paroxysmal atrial fibrillation Chronic  


 


Pulmonary hypertension Chronic

## 2017-12-28 NOTE — PDINTPN
Intensivist Progress Note


Assessment/Plan: 


Assessment/plan:








77 F s/p MV redo with Maze procedure complicated by recurrent (known) PAF and 

respiratory failure. 





* Acute Respiratory failure with hypoxia and hypercapnia likely 2/2 advanced 

age with left diaphragmatic dysfunction and multiple failed extubations. Trach 

placed 12/26 by ENT in OR. Cannot rule out contribution from PNA as well given 

elevated WBC. Started cefepime 12/26 in response to GNR in sputum culture, but 

may have been too low a dose so increased 12/28. Change weaning strategy to 

"rest" on AC, with intermittent periods of PS10 for 2 hrs BID to include back 

up rate of 10-12. If tolerates, can reduce PS, followed by longer periods of 

weaning until we think she can transition to TC alone (which she failed 12/27). 


* FEN- DHT placed 12/27. She may be able to swallow soon, so I dont thin a PEG 

is needed at this time. 


* Hypernatremia- increase FW


* Afib- on amiodarone and metoprolol increased today


* s/p MVR redo, TV annuloplasty




















12/27/17 11:21





12/28/17 13:05





Subjective: 





respiratory arrest 12/27, rapidly resolved with BMV, vent


Objective: 





 Vital Signs











Temp Pulse Resp BP Pulse Ox


 


 37.1 C   79   23 H  162/75 H  98 


 


 12/28/17 11:57  12/28/17 11:57  12/28/17 11:57  12/28/17 11:57  12/28/17 11:57








 Laboratory Results





 12/28/17 05:52 





 12/28/17 12:05 





 











 12/27/17 12/28/17 12/29/17





 05:59 05:59 05:59


 


Intake Total 84 1000 


 


Output Total 8353 867 7936


 


Balance -1266 170 -1100








 











PT  18.6 SEC (12.0-15.0)  H  12/27/17  05:35    


 


INR  1.54  (0.83-1.16)  H  12/27/17  05:35    














Physical Exam





- Physical Exam


General Appearance: WD/WN, alert, no apparent distress, thin


EENT: PERRL/EOMI


Neck: supple


Respiratory: lungs clear, normal breath sounds, decreased breath sounds, No 

respiratory distress, No accessory muscle use


Cardiac/Chest: tachycardia, other (incision clean)


Abdomen: non-tender, soft, No distended


Skin: normal color, warm/dry, No cyanosis


Lymphatic: no adenopathy


Extremities: No pedal edema


Neuro/Psych: alert, normal mood/affect, oriented x 3





ICD10 Worksheet


Patient Problems: 


 Problems











Problem Status Onset


 


Acute blood loss anemia Acute  


 


Coagulopathy Acute  


 


S/P Maze operation for atrial fibrillation Acute  ~12/20/17


 


S/P mitral valve replacement with bioprosthetic valve Acute  ~12/20/17


 


S/P tricuspid valve repair Acute  ~12/20/17


 


Secondary tricuspid regurgitation Acute  


 


Severe mitral regurgitation Acute  


 


s/p placement LV epicardial lead Acute  ~12/20/17


 


Ascending aorta enlargement Chronic  


 


Atrial enlargement, bilateral Chronic  


 


Chronic anticoagulation Chronic  


 


Chronic diastolic congestive heart failure Chronic  


 


HTN (hypertension) Chronic  


 


Paroxysmal atrial fibrillation Chronic  


 


Pulmonary hypertension Chronic

## 2017-12-28 NOTE — ASMTCMCOM
CM Note

 

CM Note                       

Notes:

Met with daughter for family meeting with Mayra Kelsey ICU manager and Can from [employer] 

[employer].. Her daughter is happy with the care so far. She reports they recently lost her 

father. Her mother's surgery was planned and she reports her mother is very motivated to get 

well.  She is still on ventilator. She is to move back to [place] to  her fiance this 

summer. Therapies suggesting inpatient rehab. Order pending. CM to follow.

 

Date Signed:  12/28/2017 02:54 PM

Electronically Signed By:Karen Jose RN

## 2017-12-28 NOTE — SOAPPROG
SOAP Progress Note


Assessment/Plan: 


Assessment: S/P 12/26/17 Tracheostomy for prolonged intubation secondary to 

respiratory failure. Trach site looks goo and trach is in place.








Plan:  


Wound care per wound care team and nursing


Toilet per respiratory


Will be okay for trach change 1/1/18. Can hold off if cuffed trach still needed 

for ventilation.








12/28/17 18:08





Subjective: 





No significant trach related complaints. Pain managed. 


Objective: 





 Vital Signs











Temp Pulse Resp BP Pulse Ox


 


 37 C   88   22 H  135/63 H  98 


 


 12/28/17 16:00  12/28/17 16:00  12/28/17 16:00  12/28/17 16:00  12/28/17 16:00








 Laboratory Results





 12/28/17 05:52 





 12/28/17 12:05 





 











 12/27/17 12/28/17 12/29/17





 05:59 05:59 05:59


 


Intake Total 84 1000 


 


Output Total 4393 663 1758


 


Balance -1266 170 -1500








 











PT  18.6 SEC (12.0-15.0)  H  12/27/17  05:35    


 


INR  1.54  (0.83-1.16)  H  12/27/17  05:35    














Physical Exam





- Physical Exam


General Appearance: WD/WN, alert, no apparent distress


EENT: PERRL/EOMI, pharynx normal


Neck: other (Trach in place. No significant drainage/bleeding. No erythema.)


Skin: normal color, warm/dry


Neuro/Psych: alert, normal mood/affect





ICD10 Worksheet


Patient Problems: 


 Problems











Problem Status Onset


 


Acute blood loss anemia Acute  


 


Coagulopathy Acute  


 


S/P Maze operation for atrial fibrillation Acute  ~12/20/17


 


S/P mitral valve replacement with bioprosthetic valve Acute  ~12/20/17


 


S/P tricuspid valve repair Acute  ~12/20/17


 


Secondary tricuspid regurgitation Acute  


 


Severe mitral regurgitation Acute  


 


s/p placement LV epicardial lead Acute  ~12/20/17


 


Ascending aorta enlargement Chronic  


 


Atrial enlargement, bilateral Chronic  


 


Chronic anticoagulation Chronic  


 


Chronic diastolic congestive heart failure Chronic  


 


HTN (hypertension) Chronic  


 


Paroxysmal atrial fibrillation Chronic  


 


Pulmonary hypertension Chronic

## 2017-12-29 NOTE — SOAPPROG
<Kate Byrne T - Last Filed: 12/29/17 11:06>





SOAP Progress Note


Assessment/Plan: 


Assessment: POD#9 Redo median sternotomy, explant old MV ring, implant #25 

Magna mitral bioprosthesis, TVA #28 Anderson MC3 ring, CoxMaze IV, placement LV 

epicardial lead tunneled to left subclavicular space


POD#3 tracheostomy


RUE PICC


DHT TFs @ 55cc/h, free water 100cc q 4h. 


D4 IV Cefepime





Severe MR/failed MV rpr - Ring exchanged for tissue MVR. Antithrombotic 

prophylaxis with Coumadin as per existing parameters for PAF.





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 Antithrombotic prophylaxis as per 

MVR.





Paroxysmal atrial fib/flutter, chronically anticoagulated with Coumadin - 

Predominant post CM4 rhythm sinus with intermittent PAF. Conduction intact and 

TCPWs clipped. AF prophylaxis with amiodarone. Adjunctive rate control with BB. 

Anticoagulation switched to lovenox bridge pending decision PEG.





Chronic dCHF - Class II on maximal medical therapy. Off CPB without inotropic 

or pressor support. Active diuresis of significant volume overload in progress.





Ventilator dependent respiratory failure - Trach placed s/p 2 failed trials of 

extubation. Paralyzed left hemidiaphragm (abnormal pericardial anatomy) and 

bronchitis (possible LLL PNA) suspected. Chest tubes out. IV Abx and vent wean 

per pulmonology. Need for PEG as yet unclear.





Acute expected blood loss anemia with coagulopathy - Exacerbated by lovenox 

bridge and CPB. Refractory to fairly significant correction with blood and 

blood products. Ultimately responsive to Factor VII. Thrombocytopenia resolved. 

Anticoag resumed without incident.








Plan:


Cont therapeutic lovenox.


Inc Amio to 200 mg BID. 150 mg IV bolus prn.


Cont metoprolol tartrate 50 mg BID.


Resume home lasix/KCl regimen.


Cont increased activity as tolerated.








12/29/17 07:51














Subjective: 





Tired and less interactive. Generally comfortable. 


Objective: 





 Vital Signs











Temp Pulse Resp BP Pulse Ox


 


 36.7 C   90   20   148/71 H  99 


 


 12/29/17 04:00  12/29/17 06:00  12/29/17 06:00  12/29/17 06:00  12/29/17 06:00








 Laboratory Results





 12/28/17 05:52 





 12/29/17 05:10 





 











 12/28/17 12/29/17 12/30/17





 05:59 05:59 05:59


 


Intake Total 1000 2933 


 


Output Total 830 1950 


 


Balance 170 983 








 











PT  18.6 SEC (12.0-15.0)  H  12/27/17  05:35    


 


INR  1.54  (0.83-1.16)  H  12/27/17  05:35    








PAF with RVR when walking.


BB uptitrated yest. Subsequent SBPs labile, generally > 120, low of 98.


FIO2 stable at 40%.


Adequate fluid balance.


Na normalized.








Physical Exam





- Physical Exam


General Appearance: no apparent distress


Respiratory: lungs clear


Cardiac/Chest: tachycardia


Skin: warm/dry


Extremities: other (no visible edema)





ICD10 Worksheet


Patient Problems: 


 Problems











Problem Status Onset


 


Acute blood loss anemia Acute  


 


Coagulopathy Acute  


 


S/P Maze operation for atrial fibrillation Acute  ~12/20/17


 


S/P mitral valve replacement with bioprosthetic valve Acute  ~12/20/17


 


S/P tricuspid valve repair Acute  ~12/20/17


 


Secondary tricuspid regurgitation Acute  


 


Severe mitral regurgitation Acute  


 


s/p placement LV epicardial lead Acute  ~12/20/17


 


Ascending aorta enlargement Chronic  


 


Atrial enlargement, bilateral Chronic  


 


Chronic anticoagulation Chronic  


 


Chronic diastolic congestive heart failure Chronic  


 


HTN (hypertension) Chronic  


 


Paroxysmal atrial fibrillation Chronic  


 


Pulmonary hypertension Chronic  














<Deann Paiz - Last Filed: 12/29/17 22:55>





SOAP Progress Note


Assessment/Plan: 


Patient seen and examined with Ashley Byrne PA-C.


No major issues - did have some a-fib this AM when standing to ambulate.


Increase Amio to 200mg BID mateusz help with rate control.


Cont vent weaning trials as tolerated.


Will need to decide on possible PEG in the near future.


Objective: 





 Vital Signs











Temp Pulse Resp BP Pulse Ox


 


 37.2 C   99   18   150/97 H  98 


 


 12/29/17 20:00  12/29/17 20:50  12/29/17 20:50  12/29/17 20:47  12/29/17 20:50








 Microbiology











 12/24/17 15:15 Blood Culture - Final





 Blood 


 


 12/24/17 15:15 Blood Culture - Final





 Blood 








 Laboratory Results





 12/29/17 10:51 





 12/29/17 15:07 





 











 12/28/17 12/29/17 12/30/17





 05:59 05:59 05:59


 


Intake Total 1000 2933 832


 


Output Total 830 1950 1200


 


Balance 170 983 -368








 











PT  18.6 SEC (12.0-15.0)  H  12/27/17  05:35    


 


INR  1.54  (0.83-1.16)  H  12/27/17  05:35

## 2017-12-29 NOTE — SOAPPROG
SOAP Progress Note


Assessment/Plan: 


Assessment: S/P 12/26/17 Tracheostomy for prolonged intubation secondary to 

respiratory failure. Trach site looks good with trach positioned well.








Plan:  


Wound care per wound care team and nursing


Toilet per respiratory


Will be okay for trach change 1/1/18. Can hold off if cuffed trach still needed 

for ventilation.


Trach cuffed - no/extremely limited risk of aspiration. OK for PO trials per 

ENT.





12/29/17 16:15





Subjective: 





No trach related complaints. 


Objective: 





 Vital Signs











Temp Pulse Resp BP Pulse Ox


 


 37.2 C   90   19   129/67 H  99 


 


 12/29/17 10:00  12/29/17 15:04  12/29/17 14:00  12/29/17 14:00  12/29/17 15:04








 Laboratory Results





 12/29/17 10:51 





 12/29/17 15:07 





 











 12/28/17 12/29/17 12/30/17





 05:59 05:59 05:59


 


Intake Total 1000 2933 


 


Output Total 830 1950 


 


Balance 170 983 








 











PT  18.6 SEC (12.0-15.0)  H  12/27/17  05:35    


 


INR  1.54  (0.83-1.16)  H  12/27/17  05:35    














Physical Exam





- Physical Exam


General Appearance: WD/WN, alert


EENT: PERRL/EOMI, pharynx normal


Neck: other (Trach in place. No erythema. No bleeding. Mild respiratory mucous 

drainage from site.)


Skin: normal color, warm/dry


Neuro/Psych: no motor/sensory deficits, alert





ICD10 Worksheet


Patient Problems: 


 Problems











Problem Status Onset


 


Acute blood loss anemia Acute  


 


Coagulopathy Acute  


 


S/P Maze operation for atrial fibrillation Acute  ~12/20/17


 


S/P mitral valve replacement with bioprosthetic valve Acute  ~12/20/17


 


S/P tricuspid valve repair Acute  ~12/20/17


 


Secondary tricuspid regurgitation Acute  


 


Severe mitral regurgitation Acute  


 


s/p placement LV epicardial lead Acute  ~12/20/17


 


Ascending aorta enlargement Chronic  


 


Atrial enlargement, bilateral Chronic  


 


Chronic anticoagulation Chronic  


 


Chronic diastolic congestive heart failure Chronic  


 


HTN (hypertension) Chronic  


 


Paroxysmal atrial fibrillation Chronic  


 


Pulmonary hypertension Chronic

## 2017-12-30 NOTE — PDINTPN
Intensivist Progress Note


Assessment/Plan: 


Assessment/plan:








77 F s/p MV redo with Maze procedure complicated by recurrent (known) PAF and 

respiratory failure. 





* Acute Respiratory failure with hypoxia and hypercapnia likely 2/2 advanced 

age with left diaphragmatic dysfunction and multiple failed extubations. Trach 

placed 12/26 by ENT in OR. Cannot rule out contribution from PNA as well given 

elevated WBC. Started cefepime 12/26 in response to GNR in sputum culture (pan-

sensitive E. Coli), but may have been too low a dose so increased 12/28 and wbc 

continues to fall. She tried TC for 2 hours today and her ABG showed 7.46-

-33-99% on 50% FiO2. Planning PS5 this afternoon, but likely TC BID 12/31. Of 

note she reported feeling fatigued and diaphoretic at end of TC wean today. I 

am optimistic about liberating her from the vent. I would NOT change her trach 1 /1 as suggested until she is fully weaned from the vent to allow for maximum 

ventilation if needed, since she has failed several times already.


* FEN- DHT placed 12/27. She may be able to swallow soon, so I dont think a PEG 

is needed at this time. 


* Hypernatremia- increased FW 12/29 and remains stable at 146


* Afib- on amiodarone and metoprolol increased 12/28, but developed rapid rate 

to 140's after walking 12/29. Rate better controlled today


* s/p MVR redo, TV annuloplasty











12/30/17 15:02





12/30/17 15:06





Subjective: 





Tolerated PS5 weans x 2 hours yesterday, but CO2 slightly elevated at end


Objective: 





 Vital Signs











Temp Pulse Resp BP Pulse Ox


 


 37.1 C   79   16   88/49 L  99 


 


 12/30/17 12:00  12/30/17 14:00  12/30/17 14:00  12/30/17 14:00  12/30/17 14:00








 Microbiology











 12/24/17 15:15 Blood Culture - Final





 Blood 


 


 12/24/17 15:15 Blood Culture - Final





 Blood 








 Laboratory Results





 12/30/17 03:45 





 12/30/17 03:45 





 











 12/29/17 12/30/17 12/31/17





 05:59 05:59 05:59


 


Intake Total 2933 1694 


 


Output Total 1950 1200 300


 


Balance 983 494 -300








 











PT  18.6 SEC (12.0-15.0)  H  12/27/17  05:35    


 


INR  1.54  (0.83-1.16)  H  12/27/17  05:35    














Physical Exam





- Physical Exam


General Appearance: alert, no apparent distress, thin


EENT: PERRL/EOMI


Neck: supple, other (trach site clean)


Respiratory: lungs clear, normal breath sounds, No respiratory distress, No 

accessory muscle use


Cardiac/Chest: tachycardia, No edema


Abdomen: non-tender, soft, No distended


Skin: normal color, warm/dry, No cyanosis


Lymphatic: no adenopathy


Extremities: No pedal edema


Neuro/Psych: alert, normal mood/affect, oriented x 3





ICD10 Worksheet


Patient Problems: 


 Problems











Problem Status Onset


 


Acute blood loss anemia Acute  


 


Coagulopathy Acute  


 


S/P Maze operation for atrial fibrillation Acute  ~12/20/17


 


S/P mitral valve replacement with bioprosthetic valve Acute  ~12/20/17


 


S/P tricuspid valve repair Acute  ~12/20/17


 


Secondary tricuspid regurgitation Acute  


 


Severe mitral regurgitation Acute  


 


s/p placement LV epicardial lead Acute  ~12/20/17


 


Ascending aorta enlargement Chronic  


 


Atrial enlargement, bilateral Chronic  


 


Chronic anticoagulation Chronic  


 


Chronic diastolic congestive heart failure Chronic  


 


HTN (hypertension) Chronic  


 


Paroxysmal atrial fibrillation Chronic  


 


Pulmonary hypertension Chronic

## 2017-12-30 NOTE — SOAPPROG
SOAP Progress Note


Assessment/Plan: 


Assessment: POD#10 Redo median sternotomy, explant old MV ring, implant #25 

Magna mitral bioprosthesis, TVA #28 Anderson MC3 ring, CoxMaze IV, placement LV 

epicardial lead tunneled to left subclavicular space


POD#4 tracheostomy


RUE PICC


DHT TFs @ 55cc/h, free water 100cc q 4h. 


D5 IV Cefepime





Severe MR/failed MV rpr - Ring exchanged for tissue MVR. Antithrombotic 

prophylaxis with Coumadin as per existing parameters for PAF.





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 Antithrombotic prophylaxis as per 

MVR.





Paroxysmal atrial fib/flutter, chronically anticoagulated with Coumadin - 

Predominant post CM4 rhythm sinus with intermittent PAF. Conduction intact and 

TCPWs clipped. AF prophylaxis with amiodarone. Adjunctive rate control with BB. 

Anticoagulation switched to lovenox bridge pending decision PEG.





Chronic dCHF - Class II on maximal medical therapy. Off CPB without inotropic 

or pressor support. Active diuresis of significant volume overload in progress. 

Nearly euvolemic.





Ventilator dependent respiratory failure - Trach placed s/p 2 failed trials of 

extubation. Paralyzed left hemidiaphragm (abnormal pericardial anatomy) and 

bronchitis (possible LLL PNA) suspected. Chest tubes out. IV Abx and vent wean 

per pulmonology. Need for PEG as yet unclear.





Acute expected blood loss anemia with coagulopathy - Exacerbated by lovenox 

bridge and CPB. Refractory to fairly significant correction with blood and 

blood products. Ultimately responsive to Factor VII. Thrombocytopenia resolved. 

Anticoag resumed without incident.








Plan:


Vent wean per ICU.


Cont therapeutic lovenox.


Cont Amio 200 mg BID. 150 mg IV bolus prn.


Inc metoprolol tartrate to 75 mg BID.


Consider SLP eval tomorrow.


Cont increased activity as tolerated.











12/30/17 08:43

















Subjective: 





Hanging in there. Peppier than yest. Thirsty. Disheartened that "CO2 up" with 

wean trials.


Objective: 





 Vital Signs











Temp Pulse Resp BP Pulse Ox


 


 37.0 C   94   16   141/68 H  99 


 


 12/30/17 03:28  12/30/17 06:00  12/30/17 06:00  12/30/17 06:00  12/30/17 06:00








 Microbiology











 12/24/17 15:15 Blood Culture - Final





 Blood 


 


 12/24/17 15:15 Blood Culture - Final





 Blood 








 Laboratory Results





 12/30/17 03:45 





 12/30/17 03:45 





 











 12/29/17 12/30/17 12/31/17





 05:59 05:59 05:59


 


Intake Total 2933 1694 


 


Output Total 1950 1200 


 


Balance 983 494 








 











PT  18.6 SEC (12.0-15.0)  H  12/27/17  05:35    


 


INR  1.54  (0.83-1.16)  H  12/27/17  05:35    








Improved rate control when active. No AF overnoc. 


SBPs generally robust.


Min vent support. 


CXR -> No pulm vasc congestion. Tiny rt pl eff. Unchanged LLL opacity.


I/Os balanced. Wt down addtl 1/2kg, now within 1.5 kg admit wt.


WBC trending down. 








Physical Exam





- Physical Exam


General Appearance: alert, no apparent distress


Respiratory: lungs clear (grossly)


Cardiac/Chest: regular rate, rhythm, other (Sternotomy and CT sites CDI)


Abdomen: non-tender, soft


Skin: warm/dry


Extremities: other (no visible edema)





ICD10 Worksheet


Patient Problems: 


 Problems











Problem Status Onset


 


Acute blood loss anemia Acute  


 


Coagulopathy Acute  


 


S/P Maze operation for atrial fibrillation Acute  ~12/20/17


 


S/P mitral valve replacement with bioprosthetic valve Acute  ~12/20/17


 


S/P tricuspid valve repair Acute  ~12/20/17


 


Secondary tricuspid regurgitation Acute  


 


Severe mitral regurgitation Acute  


 


s/p placement LV epicardial lead Acute  ~12/20/17


 


Ascending aorta enlargement Chronic  


 


Atrial enlargement, bilateral Chronic  


 


Chronic anticoagulation Chronic  


 


Chronic diastolic congestive heart failure Chronic  


 


HTN (hypertension) Chronic  


 


Paroxysmal atrial fibrillation Chronic  


 


Pulmonary hypertension Chronic

## 2017-12-31 NOTE — ASMTCMCOM
CM Note

 

CM Note                       

Notes:

Patient continues on the vent, trach. Therapies recommending In-pt Rehab.

 

Date Signed:  12/31/2017 05:18 PM

Electronically Signed By:Poonam Dawikns LCSW

## 2017-12-31 NOTE — SOAPPROG
<Kate Byrne T - Last Filed: 12/31/17 10:11>





SOAP Progress Note


Assessment/Plan: 


Assessment: POD#11 Redo median sternotomy, explant old MV ring, implant #25 

Magna mitral bioprosthesis, TVA #28 Anderson MC3 ring, CoxMaze IV, placement LV 

epicardial lead tunneled to left subclavicular space


POD#5 tracheostomy


RUE PICC


DHT TFs @ 55cc/h, free water 100cc q 4h. 


D6 IV Cefepime





Severe MR/failed MV rpr - Ring exchanged for tissue MVR. Antithrombotic 

prophylaxis with Coumadin as per existing parameters for PAF.





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 Antithrombotic prophylaxis as per 

MVR.





Paroxysmal atrial fib/flutter, chronically anticoagulated with Coumadin - 

Predominant post CM4 rhythm sinus with PACs and intermittent PAF. Conduction 

intact and TCPWs clipped. AF prophylaxis with amiodarone. Adjunctive rate 

control with BB. Anticoagulation switched to lovenox bridge pending decision 

PEG.





Chronic dCHF - Class II on maximal medical therapy. Off CPB without inotropic 

or pressor support. Significant volume overload gradually diuresed. Nearly 

euvolemic.





Ventilator dependent respiratory failure - Trach placed s/p 2 failed trials of 

extubation. Paretic left hemidiaphragm (abnormal pericardial anatomy) and 

bronchitis (possible LLL PNA) contributing factors. Chest tubes out. IV Abx and 

vent wean per pulmonology. Trach collar trials restarted. Need for PEG remains 

unclear.





Acute expected blood loss anemia with coagulopathy - Exacerbated by lovenox 

bridge and CPB. Refractory to fairly significant correction with blood and 

blood products. Ultimately responsive to Factor VII. Thrombocytopenia resolved. 

Anticoag resumed without incident.








Plan:


Vent wean per ICU.


Cont therapeutic lovenox.


Cont Amio 200 mg BID. 150 mg IV bolus prn.


Cont metoprolol tartrate 75 mg BID.


Consider SLP eval.


Cont increased activity as tolerated.


Dispo - LTAC vs IPR vs SNF decision making next 48hr.











12/31/17 08:57











Subjective: 





In good spirits, sitting in chair. Motivated for PT and RT exercise.


Objective: 





 Vital Signs











Temp Pulse Resp BP Pulse Ox


 


 37 C   83   16   147/57 H  99 


 


 12/30/17 20:00  12/31/17 08:00  12/31/17 08:00  12/31/17 06:00  12/31/17 08:00








 Laboratory Results





 12/30/17 03:45 





 12/31/17 05:40 





 











 12/30/17 12/31/17 01/01/18





 05:59 05:59 05:59


 


Intake Total 1694 1858 


 


Output Total 1200 750 


 


Balance 494 1108 








 











PT  18.6 SEC (12.0-15.0)  H  12/27/17  05:35    


 


INR  1.54  (0.83-1.16)  H  12/27/17  05:35    








Improved rate control and adequate BP on inc BB.


Tolerated trach collar trials x 2 yest, up to 2h.


Adequate fluid balance with stable wt and electrolytes.











Physical Exam





- Physical Exam


General Appearance: alert, no apparent distress


Respiratory: decreased breath sounds (left base), other (Improving BS LLL)


Cardiac/Chest: regular rate, rhythm, other (Sternotomy and CT sites CDI)


Abdomen: non-tender, soft


Skin: warm/dry


Extremities: other (no visible edema)





ICD10 Worksheet


Patient Problems: 


 Problems











Problem Status Onset


 


Acute blood loss anemia Acute  


 


Coagulopathy Acute  


 


S/P Maze operation for atrial fibrillation Acute  ~12/20/17


 


S/P mitral valve replacement with bioprosthetic valve Acute  ~12/20/17


 


S/P tricuspid valve repair Acute  ~12/20/17


 


Secondary tricuspid regurgitation Acute  


 


Severe mitral regurgitation Acute  


 


s/p placement LV epicardial lead Acute  ~12/20/17


 


Ascending aorta enlargement Chronic  


 


Atrial enlargement, bilateral Chronic  


 


Chronic anticoagulation Chronic  


 


Chronic diastolic congestive heart failure Chronic  


 


HTN (hypertension) Chronic  


 


Paroxysmal atrial fibrillation Chronic  


 


Pulmonary hypertension Chronic  














<Marvin Garcia - Last Filed: 12/31/17 16:36>





SOAP Progress Note


Assessment/Plan: 


Assessment:





Pt seen and record reviewed. Pt awake and alert. NO c/o


Sitting in chair and writing. Tolerating trach collar.


Agree with note above.




















Plan:





12/31/17 16:35





Objective: 





 Vital Signs











Temp Pulse Resp BP Pulse Ox


 


 36.8 C   73   16   124/55 H  97 


 


 12/31/17 12:00  12/31/17 14:00  12/31/17 14:00  12/31/17 14:00  12/31/17 14:00








 Laboratory Results





 12/30/17 03:45 





 12/31/17 05:40 





 











 12/30/17 12/31/17 01/01/18





 05:59 05:59 05:59


 


Intake Total 1694 1858 


 


Output Total 1200 750 450


 


Balance 494 1108 -450








 











PT  18.6 SEC (12.0-15.0)  H  12/27/17  05:35    


 


INR  1.54  (0.83-1.16)  H  12/27/17  05:35

## 2017-12-31 NOTE — PDINTPN
Intensivist Progress Note


Assessment/Plan: 


Assessment/plan:








77 F s/p MV redo with Maze procedure complicated by recurrent (known) PAF and 

respiratory failure. 





* Acute Respiratory failure with hypoxia and hypercapnia likely 2/2 advanced 

age with left diaphragmatic dysfunction and multiple failed extubations. Trach 

placed 12/26 by ENT in OR. Cannot rule out contribution from PNA as well given 

elevated WBC. Started cefepime 12/26 in response to GNR in sputum culture (pan-

sensitive E. Coli), but may have been too low a dose so increased 12/28 and wbc 

continues to fall. Very encouraging TC weans. Will increase to 3 hours BID 

today.  I would NOT change her trach 1/1 as suggested until she is fully weaned 

from the vent to allow for maximum ventilation if needed, since she has failed 

several times already.


* FEN- DHT placed 12/27. She may be able to swallow soon, so I dont think a PEG 

is needed at this time. 


* Hypernatremia- increased FW 12/29 and remains stable at 146


* Afib- on amiodarone and metoprolol increased 12/28, but developed rapid rate 

to 140's after walking 12/29. Rate better controlled today


* s/p MVR redo, TV annuloplasty. Recheck labs in AM


* 











12/30/17 15:02





12/30/17 15:06





12/31/17 14:50





Subjective: 





Tolerated trach collar weans x 2 twice on 12/30


Objective: 





 Vital Signs











Temp Pulse Resp BP Pulse Ox


 


 36.8 C   73   16   124/55 H  97 


 


 12/31/17 12:00  12/31/17 14:00  12/31/17 14:00  12/31/17 14:00  12/31/17 14:00








 Laboratory Results





 12/30/17 03:45 





 12/31/17 05:40 





 











 12/30/17 12/31/17 01/01/18





 05:59 05:59 05:59


 


Intake Total 1694 1858 


 


Output Total 1200 750 450


 


Balance 494 1108 -450








 











PT  18.6 SEC (12.0-15.0)  H  12/27/17  05:35    


 


INR  1.54  (0.83-1.16)  H  12/27/17  05:35    














Physical Exam





- Physical Exam


General Appearance: WD/WN, alert, no apparent distress, thin


EENT: PERRL/EOMI


Neck: supple, other (some leakage from trach site)


Respiratory: lungs clear, normal breath sounds, No respiratory distress, No 

accessory muscle use


Cardiac/Chest: regular rate, rhythm, No edema


Abdomen: non-tender, soft, No distended


Skin: normal color, warm/dry, No cyanosis


Lymphatic: no adenopathy


Extremities: pedal edema


Neuro/Psych: alert, normal mood/affect, oriented x 3





ICD10 Worksheet


Patient Problems: 


 Problems











Problem Status Onset


 


Acute blood loss anemia Acute  


 


Coagulopathy Acute  


 


S/P Maze operation for atrial fibrillation Acute  ~12/20/17


 


S/P mitral valve replacement with bioprosthetic valve Acute  ~12/20/17


 


S/P tricuspid valve repair Acute  ~12/20/17


 


Secondary tricuspid regurgitation Acute  


 


Severe mitral regurgitation Acute  


 


s/p placement LV epicardial lead Acute  ~12/20/17


 


Ascending aorta enlargement Chronic  


 


Atrial enlargement, bilateral Chronic  


 


Chronic anticoagulation Chronic  


 


Chronic diastolic congestive heart failure Chronic  


 


HTN (hypertension) Chronic  


 


Paroxysmal atrial fibrillation Chronic  


 


Pulmonary hypertension Chronic

## 2018-01-01 NOTE — SOAPPROG
<Kate Byrne T - Last Filed: 01/01/18 10:01>





SOAP Progress Note


Assessment/Plan: 


Assessment: POD#12 Redo median sternotomy, explant old MV ring, implant #25 

Magna mitral bioprosthesis, TVA #28 Anderson MC3 ring, CoxMaze IV, placement LV 

epicardial lead tunneled to left subclavicular space


POD#6 tracheostomy


RUE PICC


DHT TFs @ 55cc/h, free water 100cc q 4h. 


D7 IV Cefepime





Severe MR/failed MV rpr - Ring exchanged for tissue MVR. Antithrombotic 

prophylaxis with Coumadin as per existing parameters for PAF.





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 Antithrombotic prophylaxis as per 

MVR.





Paroxysmal atrial fib/flutter, chronically anticoagulated with Coumadin - 

Predominant post CM4 rhythm sinus with PACs and intermittent PAF. Conduction 

intact and TCPWs clipped. AF prophylaxis with amiodarone. Adjunctive rate 

control with BB. Lovenox bridge pending INR > 2.





Chronic dCHF - Class II on maximal medical therapy. Off CPB without inotropic 

or pressor support. Significant volume overload gradually diuresed. Now 

euvolemic.





Ventilator dependent respiratory failure - Trach placed s/p 2 failed trials of 

extubation. Paretic left hemidiaphragm (abnormal pericardial anatomy) and 

bronchitis (possible LLL PNA) contributing factors. Chest tubes out. IV Abx and 

vent wean per pulmonology. Trach collar trials restarted. Need for PEG 

unlikely. 





Acute expected blood loss anemia with coagulopathy - Exacerbated by lovenox 

bridge and CPB. Refractory to fairly significant correction with blood and 

blood products. Ultimately responsive to Factor VII. Thrombocytopenia resolved. 

Anticoag resumed without incident.








Plan:


Vent wean per ICU.


Resume Coumadin. 


Cont Amio 200 mg BID.


Cont metoprolol tartrate 75 mg BID.


Consider SLP eval.


Cont increased activity as tolerated.


Dispo - LTAC vs IPR decision making.








01/01/18 08:24








Subjective: 





Tired of being hospitalized. Eager to exceed RT and PT goals. Loose BMs but 

more afraid of "impaction".


Objective: 





 Vital Signs











Temp Pulse Resp BP Pulse Ox


 


 37 C   89   30 H  160/83 H  95 


 


 01/01/18 02:00  01/01/18 06:00  01/01/18 06:00  01/01/18 06:00  01/01/18 06:00








 Laboratory Results





 01/01/18 04:25 





 01/01/18 04:25 





 











 12/31/17 01/01/18 01/02/18





 05:59 05:59 05:59


 


Intake Total 1858 2096 


 


Output Total 750 1075 


 


Balance 1108 1021 








 











PT  15.2 SEC (12.0-15.0)  H  01/01/18  04:25    


 


INR  1.18  (0.83-1.16)  H  01/01/18  04:25    








HR and BP generally well controlled.


Walking with BiPAP. Inc BP, RR and FIO2 on am trach collar and session aborted.


Essentially at admit wt.





 





Physical Exam





- Physical Exam


General Appearance: alert, no apparent distress


Respiratory: normal breath sounds (rt side), decreased breath sounds (LLL)


Cardiac/Chest: regular rate, rhythm, other (Sternum grossly stable. Sternotomy 

and CT sites CDI)


Abdomen: non-tender, soft


Skin: warm/dry


Extremities: other (no visible edema)





ICD10 Worksheet


Patient Problems: 


 Problems











Problem Status Onset


 


Acute blood loss anemia Acute  


 


Coagulopathy Acute  


 


S/P Maze operation for atrial fibrillation Acute  ~12/20/17


 


S/P mitral valve replacement with bioprosthetic valve Acute  ~12/20/17


 


S/P tricuspid valve repair Acute  ~12/20/17


 


Secondary tricuspid regurgitation Acute  


 


Severe mitral regurgitation Acute  


 


s/p placement LV epicardial lead Acute  ~12/20/17


 


Ascending aorta enlargement Chronic  


 


Atrial enlargement, bilateral Chronic  


 


Chronic anticoagulation Chronic  


 


Chronic diastolic congestive heart failure Chronic  


 


HTN (hypertension) Chronic  


 


Paroxysmal atrial fibrillation Chronic  


 


Pulmonary hypertension Chronic  














<Marvin Garcia - Last Filed: 01/01/18 15:56>





SOAP Progress Note


Assessment/Plan: 


Assessment:





Pt seen and record reviewed. Awake and alert. Breathing comfortably on trach 

collar.


Agree with note.


LTAC or SNF




















Plan:





01/01/18 15:55





Objective: 





 Vital Signs











Temp Pulse Resp BP Pulse Ox


 


 36.8 C   83   18   122/61 H  95 


 


 01/01/18 08:00  01/01/18 14:00  01/01/18 14:00  01/01/18 14:00  01/01/18 14:00








 Laboratory Results





 01/01/18 14:50 





 01/01/18 04:25 





 











 12/31/17 01/01/18 01/02/18





 05:59 05:59 05:59


 


Intake Total 2276 2095 


 


Output Total 854 1075 1175


 


Balance 1108 1021 -1175








 











PT  15.2 SEC (12.0-15.0)  H  01/01/18  04:25    


 


INR  1.18  (0.83-1.16)  H  01/01/18  04:25

## 2018-01-01 NOTE — PDINTPN
Intensivist Progress Note


Assessment/Plan: 


Assessment/plan:








77 F s/p MV redo with Maze procedure complicated by recurrent (known) PAF and 

respiratory failure. 





* Acute Respiratory failure with hypoxia and hypercapnia likely 2/2 advanced 

age with left diaphragmatic dysfunction and multiple failed extubations. Trach 

placed 12/26 by ENT in OR. Cannot rule out contribution from PNA as well given 

elevated WBC. Started cefepime 12/26 in response to GNR in sputum culture (pan-

sensitive E. Coli), but may have been too low a dose so increased 12/28 and wbc 

continues to fall. Very encouraging TC weans. Will continue 3 hours BID today, 

with hopes to advance to more prolonged weans 1/2/17. At some point during her 

prolonged wean she will need an ABG to evaluate. I would NOT change her trach 1/ 1 as suggested until she is fully weaned from the vent to allow for maximum 

ventilation if needed, since she has failed several times already.


* FEN- DHT placed 12/27. She may be able to swallow soon, so I dont think a PEG 

is needed at this time. 


* Hypernatremia- increased FW 12/29 and remains stable at 146


* Afib- on amiodarone and metoprolol increased 12/28, but developed rapid rate 

to 140's after walking 12/29. Rate better controlled today


* s/p MVR redo, TV annuloplasty. Labs OK though WBC 16. Observe


* 








Subjective: 





Weaned for 5 hours (three separate sets) 12/31 and complained of fatigue at end


Objective: 





 Vital Signs











Temp Pulse Resp BP Pulse Ox


 


 36.8 C   83   18   122/61 H  95 


 


 01/01/18 08:00  01/01/18 14:00  01/01/18 14:00  01/01/18 14:00  01/01/18 14:00








 Laboratory Results





 01/01/18 14:50 





 01/01/18 04:25 





 











 12/31/17 01/01/18 01/02/18





 05:59 05:59 05:59


 


Intake Total 1858 2096 


 


Output Total 750 1075 1175


 


Balance 1108 1021 -1175








 











PT  15.2 SEC (12.0-15.0)  H  01/01/18  04:25    


 


INR  1.18  (0.83-1.16)  H  01/01/18  04:25    














Physical Exam





- Physical Exam


General Appearance: WD/WN, alert, no apparent distress


EENT: PERRL/EOMI


Neck: supple, other (trach site clean)


Respiratory: lungs clear, normal breath sounds, decreased breath sounds, No 

respiratory distress, No accessory muscle use


Cardiac/Chest: regular rate, rhythm, No edema


Abdomen: non-tender, soft, No distended


Skin: normal color, warm/dry, No cyanosis


Lymphatic: no adenopathy


Extremities: No pedal edema


Neuro/Psych: normal mood/affect, oriented x 3





ICD10 Worksheet


Patient Problems: 


 Problems











Problem Status Onset


 


Acute blood loss anemia Acute  


 


Coagulopathy Acute  


 


S/P Maze operation for atrial fibrillation Acute  ~12/20/17


 


S/P mitral valve replacement with bioprosthetic valve Acute  ~12/20/17


 


S/P tricuspid valve repair Acute  ~12/20/17


 


Secondary tricuspid regurgitation Acute  


 


Severe mitral regurgitation Acute  


 


s/p placement LV epicardial lead Acute  ~12/20/17


 


Ascending aorta enlargement Chronic  


 


Atrial enlargement, bilateral Chronic  


 


Chronic anticoagulation Chronic  


 


Chronic diastolic congestive heart failure Chronic  


 


HTN (hypertension) Chronic  


 


Paroxysmal atrial fibrillation Chronic  


 


Pulmonary hypertension Chronic

## 2018-01-02 NOTE — ASMTCMCOM
CM Note

 

CM Note                       

Notes:

"Family Meeting" today see Spiritual Care Notes. Family has had a very difficult 

time. Daughters' father  during Cheryl's hospitalization so have been under a great deal of 

emotional and physical stress. They are all medical people and are either working or retired from 

hospital settings. They feel that patient is going above and beyond her capabilities, up and 

ambulating on a vent. The are concerned that patient isn't getting enough rest-hospital staff in 

and out of room during day and night and it's hard for her to get any consistent sleep. Family 

would like a call from Pt Rep.

 

Date Signed:  2018 05:25 PM

Electronically Signed By:Poonam Dawkins LCSW

## 2018-01-02 NOTE — PDINTPN
Intensivist Progress Note


Assessment/Plan: 


Assessment:


77 F s/p MV redo with Maze procedure complicated by recurrent (known) PAF and 

respiratory failure. 





* Acute Respiratory failure with hypoxia and hypercapnia likely 2/2 advanced 

age with left diaphragmatic dysfunction and multiple failed extubations. Trach 

placed 12/26 by ENT in OR. Cannot rule out contribution from PNA as well given 

elevated WBC. Started cefepime 12/26 in response to GNR in sputum culture (pan-

sensitive E. Coli), but may have been too low a dose so increased 12/28 and wbc 

continues to fall slowly, remains afebrile. Very encouraging TC weans. Will aim 

for 2-3 hours BID-TID today, with hopes to advance to more prolonged weans 

later this week.  to evaluate. 


* FEN- DHT placed 12/27. She may be able to swallow soon, so I don't think a 

PEG is needed at this time. 


* Hypernatremia- increased FW 12/29. Now normalized. 


* Afib- on amiodarone and metoprolol increased 12/28, but developed rapid rate 

to 140's after walking 12/29. Rate well controlled today


* s/p MVR redo, TV annuloplasty. Observe





Plan: Try to increase trach collar weans. Will check a VBG on trach collar 

today. Will hold off on evaluation of left diaphragm function for now. 








01/02/18 14:12





Subjective: 





Doing well with trach collar trials, denies dyspnea, energy improved. 


Objective: 





 Vital Signs











Temp Pulse Resp BP Pulse Ox


 


 36.9 C   76   20   105/52 L  94 


 


 01/02/18 02:47  01/02/18 12:00  01/02/18 12:00  01/02/18 12:00  01/02/18 12:00








 Laboratory Results





 01/02/18 05:40 





 01/02/18 05:40 





 











 01/01/18 01/02/18 01/03/18





 05:59 05:59 05:59


 


Intake Total 2096 1960 


 


Output Total 1075 1525 600


 


Balance 1021 435 -600








 











PT  14.4 SEC (12.0-15.0)   01/02/18  05:40    


 


INR  1.10  (0.83-1.16)   01/02/18  05:40    








CXR: persistent left basilar opacity. Images reviewed by me. 





Physical Exam





- Physical Exam


General Appearance: alert, no apparent distress


EENT: normal ENT inspection


Neck: normal inspection, other (trach site OK)


Respiratory: crackles (bases)


Cardiac/Chest: regular rate, rhythm, No edema


Abdomen: normal bowel sounds, non-tender


Skin: normal color, warm/dry


Extremities: normal inspection


Neuro/Psych: alert, normal mood/affect, oriented x 3





ICD10 Worksheet


Patient Problems: 


 Problems











Problem Status Onset


 


Acute blood loss anemia Acute  


 


Coagulopathy Acute  


 


S/P Maze operation for atrial fibrillation Acute  ~12/20/17


 


S/P mitral valve replacement with bioprosthetic valve Acute  ~12/20/17


 


S/P tricuspid valve repair Acute  ~12/20/17


 


Secondary tricuspid regurgitation Acute  


 


Severe mitral regurgitation Acute  


 


s/p placement LV epicardial lead Acute  ~12/20/17


 


Ascending aorta enlargement Chronic  


 


Atrial enlargement, bilateral Chronic  


 


Chronic anticoagulation Chronic  


 


Chronic diastolic congestive heart failure Chronic  


 


HTN (hypertension) Chronic  


 


Paroxysmal atrial fibrillation Chronic  


 


Pulmonary hypertension Chronic

## 2018-01-02 NOTE — SOAPPROG
SOAP Progress Note


Assessment/Plan: 


POD #13: Redo median sternotomy, explant old MV ring, implant #25 Magna mitral 

bioprosthesis, TVA #28 Anderson MC3 ring, CoxMaze IV, placement LV epicardial 

lead tunneled to left subclavicular space





POD #7: Open tracheostomy placement 





Severe MR/failed MV rpr - Ring exchanged for tissue MVR. Antithrombotic 

prophylaxis as per CM 4.





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 Antithrombotic prophylaxis as per 

CM 4.





Paroxysmal atrial fib/flutter s/p CM 4 -  continue amiodarone and BB. 

Thromboprophylaxis with BID Lovenox.  





Class II Chronic dCHF  Continue Lasix/BB. 





Ventilator dependent respiratory failure - s/p trach placement. Cefepime (day 8/

?) for E. coli in sputum. Vent weaning and Cefepime duration as per 

pulmonology. 


 


Acute blood loss anemia with resolved thrombocytopenia and coagulopathy - 

stable. 





DVT prophylaxis - Continue Lovenox BID.  





Lines/drains - RUE PICC, NGT.





Nutrition - Continue tube feeds at 55cc/h with 100 cc/h q4h flushes via NGT. 

PEG deferred as plan is to wean ventilator with eventual PO intake.  





Subjective: 


Denies pain, dyspnea. 


Objective: 





 Vital Signs











Temp Pulse Resp BP Pulse Ox


 


 36.9 C   88   27 H  169/72 H  97 


 


 01/02/18 02:47  01/02/18 06:00  01/02/18 06:00  01/02/18 06:00  01/02/18 06:00








 Laboratory Results





 01/02/18 05:40 





 01/02/18 05:40 





 











 01/01/18 01/02/18 01/03/18





 05:59 05:59 05:59


 


Intake Total 2096 1960 


 


Output Total 1075 1525 


 


Balance 1021 435 








 











PT  14.4 SEC (12.0-15.0)   01/02/18  05:40    


 


INR  1.10  (0.83-1.16)   01/02/18  05:40    














Physical Exam





- Physical Exam


General Appearance: WD/WN, alert, no apparent distress


EENT: No scleral icterus (R), No scleral icterus (L)


Neck: other (trach in place)


Respiratory: lungs clear, normal breath sounds, No respiratory distress


Cardiac/Chest: regular rate, rhythm


Abdomen: non-tender, soft, No distended


Skin: normal color, warm/dry


Extremities: pedal edema


Neuro/Psych: no motor/sensory deficits, alert, normal mood/affect, oriented x 3





ICD10 Worksheet


Patient Problems: 


 Problems











Problem Status Onset


 


Acute blood loss anemia Acute  


 


Coagulopathy Acute  


 


S/P Maze operation for atrial fibrillation Acute  ~12/20/17


 


S/P mitral valve replacement with bioprosthetic valve Acute  ~12/20/17


 


S/P tricuspid valve repair Acute  ~12/20/17


 


Secondary tricuspid regurgitation Acute  


 


Severe mitral regurgitation Acute  


 


s/p placement LV epicardial lead Acute  ~12/20/17


 


Ascending aorta enlargement Chronic  


 


Atrial enlargement, bilateral Chronic  


 


Chronic anticoagulation Chronic  


 


Chronic diastolic congestive heart failure Chronic  


 


HTN (hypertension) Chronic  


 


Paroxysmal atrial fibrillation Chronic  


 


Pulmonary hypertension Chronic

## 2018-01-03 NOTE — PDINTPN
Intensivist Progress Note


Assessment/Plan: 


Assessment:


77 F s/p MV redo with Maze procedure complicated by recurrent (known) PAF and 

respiratory failure. 





* Acute Respiratory failure with hypoxia and hypercapnia likely 2/2 advanced 

age with left diaphragmatic dysfunction and multiple failed extubations. Trach 

placed 12/26 by ENT in OR. Cannot rule out contribution from PNA as well given 

elevated WBC. Started cefepime 12/26 in response to GNR in sputum culture (pan-

sensitive E. Coli), but may have been too low a dose so increased 12/28 and wbc 

continues to fall slowly, remains afebrile. Very encouraging TC weans. Will aim 

for 4 hours BID today, with hopes to advance to more prolonged weans later this 

week.  


* FEN- DHT placed 12/27. She may be able to swallow soon, so I don't think a 

PEG is needed at this time. 


* Hypernatremia- increased FW 12/29. Now normalized. 


* Afib- on amiodarone and metoprolol increased 12/28, but developed rapid rate 

to 140's after walking 12/29. Rate well controlled today


* s/p MVR redo, TV annuloplasty. Observe


* Anemia; Hgb stable. no signs of active bleeding.





Plan: Try to increase trach collar weans. ST to try PMSV if tolerated. If doing 

well, may be able to do swallow evaluation, but not for another day or so at 

least. Will hold off on evaluation of left diaphragm function for now. 








01/03/18 12:59





Subjective: 





Did well for 4 hours of trach collar this morning, now resting on vent. Denies 

dyspnea.


Objective: 





 Vital Signs











Temp Pulse Resp BP Pulse Ox


 


 37.0 C   85   95 H  97/52 L  20 L


 


 01/03/18 12:00  01/03/18 12:00  01/03/18 12:00  01/03/18 12:00  01/03/18 12:00








 Laboratory Results





 01/02/18 05:40 





 01/02/18 05:40 





 











 01/02/18 01/03/18 01/04/18





 05:59 05:59 05:59


 


Intake Total 1960 903 


 


Output Total 1525 1800 750


 


Balance 435 -897 -750








 











PT  14.4 SEC (12.0-15.0)   01/02/18  05:40    


 


INR  1.10  (0.83-1.16)   01/02/18  05:40    











 Laboratory Tests











  01/02/18





  13:50


 


VBG pH  7.44 H


 


VBG HCO3  34 H


 


Mixed VBG pCO2  51 H














Physical Exam





- Physical Exam


General Appearance: alert, no apparent distress


EENT: normal ENT inspection


Neck: normal inspection, other (trach OK)


Respiratory: lungs clear, decreased breath sounds (left)


Cardiac/Chest: regular rate, rhythm, No edema


Abdomen: normal bowel sounds, non-tender, soft


Skin: normal color, warm/dry


Extremities: normal inspection


Neuro/Psych: alert, normal mood/affect, oriented x 3





ICD10 Worksheet


Patient Problems: 


 Problems











Problem Status Onset


 


Acute blood loss anemia Acute  


 


Coagulopathy Acute  


 


S/P Maze operation for atrial fibrillation Acute  ~12/20/17


 


S/P mitral valve replacement with bioprosthetic valve Acute  ~12/20/17


 


S/P tricuspid valve repair Acute  ~12/20/17


 


Secondary tricuspid regurgitation Acute  


 


Severe mitral regurgitation Acute  


 


s/p placement LV epicardial lead Acute  ~12/20/17


 


Ascending aorta enlargement Chronic  


 


Atrial enlargement, bilateral Chronic  


 


Chronic anticoagulation Chronic  


 


Chronic diastolic congestive heart failure Chronic  


 


HTN (hypertension) Chronic  


 


Paroxysmal atrial fibrillation Chronic  


 


Pulmonary hypertension Chronic

## 2018-01-03 NOTE — SOAPPROG
SOAP Progress Note


Assessment/Plan: 


Assessment: POD#14 Redo median sternotomy, explant old MV ring, implant #25 

Magna mitral bioprosthesis, TVA #28 Anderson MC3 ring, CoxMaze IV, placement LV 

epicardial lead tunneled to left subclavicular space


POD#8 tracheostomy


RUE PICC


DHT TFs @ 55cc/h, free water 100cc q 4h. 


D9 IV Cefepime





Severe MR/failed MV rpr - Ring exchanged for tissue MVR. Antithrombotic 

prophylaxis with Coumadin as per existing parameters for PAF.





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 Antithrombotic prophylaxis as per 

MVR.





Paroxysmal atrial fib/flutter, chronically anticoagulated with Coumadin - 

Predominant post CM4 rhythm sinus with PACs and intermittent PAF. Conduction 

intact and TCPWs clipped. AF prophylaxis with amiodarone. Adjunctive rate 

control with BB. Anticoagulation switched to Lovenox bridge pending decision 

PEG.





Chronic dCHF - Class II on maximal medical therapy. Off CPB without inotropic 

or pressor support. Significant volume overload gradually diuresed. Now 

euvolemic.





Ventilator dependent respiratory failure - Trach placed s/p 2 failed trials of 

extubation. Paretic left hemidiaphragm (abnormal pericardial anatomy) and 

bronchitis (possible LLL PNA) contributing factors. Chest tubes out. IV Abx and 

vent wean per pulmonology. Trach collar trials in progress. Need for PEG 

uncertain but unlikely. 





Acute expected blood loss anemia with coagulopathy - Exacerbated by lovenox 

bridge and CPB. Refractory to fairly significant correction with blood and 

blood products. Ultimately responsive to Factor VII. Thrombocytopenia resolved. 

Anticoag resumed without incident.








Plan:


Cont supportive care as per multidisciplinary team








01/03/18 06:46








Subjective: 





Awake and alert. In good spirits. Happy with resp progress/tolerance of trach 

collar. Satisfying BM.


Objective: 





 Vital Signs











Temp Pulse Resp BP Pulse Ox


 


 36.8 C   83   17   112/59 L  99 


 


 01/03/18 04:00  01/03/18 04:00  01/03/18 04:00  01/03/18 04:00  01/03/18 04:00








 Laboratory Results





 01/02/18 05:40 





 01/02/18 05:40 





 











 01/02/18 01/03/18 01/04/18





 05:59 05:59 05:59


 


Intake Total 1960 903 


 


Output Total 1525 1800 


 


Balance 435 -897 








 











PT  14.4 SEC (12.0-15.0)   01/02/18  05:40    


 


INR  1.10  (0.83-1.16)   01/02/18  05:40    








HR and BP controlled at rest.


Tolerating TID trach collar trials 2-3 hr.


Adequate fluid balance.





Physical Exam





- Physical Exam


General Appearance: alert, no apparent distress


Respiratory: decreased breath sounds (left base o/w CTA)


Cardiac/Chest: regular rate, rhythm, other (Sternum grossly stable. Sternotomy 

and CT sites healing well.)


Abdomen: non-tender, soft


Skin: warm/dry


Extremities: other (no visible edema)





ICD10 Worksheet


Patient Problems: 


 Problems











Problem Status Onset


 


Acute blood loss anemia Acute  


 


Coagulopathy Acute  


 


S/P Maze operation for atrial fibrillation Acute  ~12/20/17


 


S/P mitral valve replacement with bioprosthetic valve Acute  ~12/20/17


 


S/P tricuspid valve repair Acute  ~12/20/17


 


Secondary tricuspid regurgitation Acute  


 


Severe mitral regurgitation Acute  


 


s/p placement LV epicardial lead Acute  ~12/20/17


 


Ascending aorta enlargement Chronic  


 


Atrial enlargement, bilateral Chronic  


 


Chronic anticoagulation Chronic  


 


Chronic diastolic congestive heart failure Chronic  


 


HTN (hypertension) Chronic  


 


Paroxysmal atrial fibrillation Chronic  


 


Pulmonary hypertension Chronic

## 2018-01-04 NOTE — SOAPPROG
SOAP Progress Note


Assessment/Plan: 


POD #15: Redo median sternotomy, explant old MV ring, implant #25 Magna mitral 

bioprosthesis, TVA #28 Anderson MC3 ring, CoxMaze IV, placement LV epicardial 

lead tunneled to left subclavicular space





POD #9: Open tracheostomy placement 





Severe MR/failed MV rpr - Ring exchanged for tissue MVR. Antithrombotic 

prophylaxis as per CM 4.





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 Antithrombotic prophylaxis as per 

CM 4.





Paroxysmal atrial fib/flutter s/p CM 4 -  continue amiodarone and BB. 

Thromboprophylaxis with BID Lovenox.  





Class II Chronic dCHF  Continue Lasix/BB. 





Ventilator dependent respiratory failure - s/p trach placement with active 

weaning. Cefepime (day 10/?) for E. coli in sputum. Vent weaning and Cefepime 

duration as per pulmonology. 


 


Acute blood loss anemia with resolved thrombocytopenia and coagulopathy - 

stable. 





DVT prophylaxis - Continue Lovenox BID.  





Lines/drains - RUE PICC, NGT.





Nutrition - Continue tube feeds at 55cc/h with 100 cc/h q4h flushes via NGT. 

PEG deferred as plan is to wean ventilator with eventual PO intake.  








Subjective: 


No complaints this morning. Hopeful to start taking PO soon.


Objective: 





 Vital Signs











Temp Pulse Resp BP Pulse Ox


 


 37 C   83   16   127/43 H  99 


 


 01/04/18 04:00  01/04/18 04:00  01/04/18 04:00  01/04/18 04:00  01/04/18 04:00








 Laboratory Results





 01/02/18 05:40 





 01/02/18 05:40 





 











 01/03/18 01/04/18 01/05/18





 05:59 05:59 05:59


 


Intake Total 903 2476 


 


Output Total 1800 1400 


 


Balance -897 1076 








 











PT  14.4 SEC (12.0-15.0)   01/02/18  05:40    


 


INR  1.10  (0.83-1.16)   01/02/18  05:40    














Physical Exam





- Physical Exam


General Appearance: WD/WN, alert, no apparent distress


EENT: No scleral icterus (R), No scleral icterus (L)


Neck: other (trach in place)


Respiratory: lungs clear, normal breath sounds, No respiratory distress


Cardiac/Chest: regular rate, rhythm, extra beats


Abdomen: non-tender, soft, No distended


Skin: normal color, warm/dry


Extremities: No pedal edema


Neuro/Psych: no motor/sensory deficits, alert, normal mood/affect, oriented x 3





ICD10 Worksheet


Patient Problems: 


 Problems











Problem Status Onset


 


Acute blood loss anemia Acute  


 


Coagulopathy Acute  


 


S/P Maze operation for atrial fibrillation Acute  ~12/20/17


 


S/P mitral valve replacement with bioprosthetic valve Acute  ~12/20/17


 


S/P tricuspid valve repair Acute  ~12/20/17


 


Secondary tricuspid regurgitation Acute  


 


Severe mitral regurgitation Acute  


 


s/p placement LV epicardial lead Acute  ~12/20/17


 


Ascending aorta enlargement Chronic  


 


Atrial enlargement, bilateral Chronic  


 


Chronic anticoagulation Chronic  


 


Chronic diastolic congestive heart failure Chronic  


 


HTN (hypertension) Chronic  


 


Paroxysmal atrial fibrillation Chronic  


 


Pulmonary hypertension Chronic

## 2018-01-04 NOTE — PDINTPN
Intensivist Progress Note


Assessment/Plan: 


Assessment:


77 F s/p MV redo with Maze procedure complicated by recurrent (known) PAF and 

respiratory failure. 





* Acute Respiratory failure with hypoxia and hypercapnia likely 2/2 advanced 

age with left diaphragmatic dysfunction and multiple failed extubations. Trach 

placed 12/26 by ENT in OR. Cannot rule out contribution from PNA as well given 

elevated WBC. Started cefepime 12/26 in response to GNR in sputum culture (pan-

sensitive E. Coli), but may have been too low a dose so increased 12/28 and wbc 

continues to fall slowly, remains afebrile. 


* FEN- DHT placed 12/27, came out today. She may be able to swallow soon, so I 

don't think a PEG is needed at this time. 


* Hypernatremia- increased FW 12/29. Now normalized. 


* Afib- on amiodarone and metoprolol increased 12/28, but developed rapid rate 

to 140's after walking 12/29. ow in NSR.


* s/p MVR redo, TV annuloplasty. Observe


* Anemia; Hgb stable. no signs of active bleeding.





Plan: Try to increase trach collar weans, try to go 12 hours today, with PMSV 

as long as tolerated. Will not put back on vent during the day unless she has 

CO2 retention. Will ask ENT to downsize trach, which will make swallow and 

spontaneous breathing easier, but could make leak/speech worse. Videoswallow 

tomorrow. Will hold off on evaluation of left diaphragm function for now. 





01/04/18 12:37





Subjective: 





Speaking with PMSV. A bit tired from working with ST, PT, OT, but not feeling 

dyspneic.


Objective: 





 Vital Signs











Temp Pulse Resp BP Pulse Ox


 


 36.9 C   95   26 H  146/65 H  97 


 


 01/04/18 08:00  01/04/18 09:29  01/04/18 08:00  01/04/18 09:29  01/04/18 08:00








 Laboratory Results





 01/02/18 05:40 





 01/02/18 05:40 





 











 01/03/18 01/04/18 01/05/18





 05:59 05:59 05:59


 


Intake Total 903 2476 


 


Output Total 1800 1400 


 


Balance -897 1076 








 











PT  14.4 SEC (12.0-15.0)   01/02/18  05:40    


 


INR  1.10  (0.83-1.16)   01/02/18  05:40    














Physical Exam





- Physical Exam


General Appearance: alert, no apparent distress


EENT: normal ENT inspection


Neck: normal inspection, other (trach site OK)


Respiratory: lungs clear, No normal breath sounds


Cardiac/Chest: regular rate, rhythm, No edema


Abdomen: normal bowel sounds, non-tender, soft


Skin: normal color, warm/dry


Extremities: normal inspection


Neuro/Psych: alert, normal mood/affect, oriented x 3





ICD10 Worksheet


Patient Problems: 


 Problems











Problem Status Onset


 


Acute blood loss anemia Acute  


 


Coagulopathy Acute  


 


S/P Maze operation for atrial fibrillation Acute  ~12/20/17


 


S/P mitral valve replacement with bioprosthetic valve Acute  ~12/20/17


 


S/P tricuspid valve repair Acute  ~12/20/17


 


Secondary tricuspid regurgitation Acute  


 


Severe mitral regurgitation Acute  


 


s/p placement LV epicardial lead Acute  ~12/20/17


 


Ascending aorta enlargement Chronic  


 


Atrial enlargement, bilateral Chronic  


 


Chronic anticoagulation Chronic  


 


Chronic diastolic congestive heart failure Chronic  


 


HTN (hypertension) Chronic  


 


Paroxysmal atrial fibrillation Chronic  


 


Pulmonary hypertension Chronic

## 2018-01-04 NOTE — ASMTCMCOM
CM Note

 

CM Note                       

Notes:

A follow up meeting was scheduled for patient's family with Dr. Anglin today, however it was 

cancelled due to family having scheduling conflicts.Chaplain Carleen will reschedule the 


meeting. Spoke with Jen from inpatient rehab who says patient can still participate in their 

program with a trach but has to be weaned off the vent. The family has been concerned about 

activity level of patient per Jen. CM will address this in the upcoming family meeting. There 

is one SNF rehab program that will take a trach, [facility]. Depending on the family 

meeting and what they choose, we will pursue this if needed. [facility] is the only plan B choice if 

[facility] cannot take the patient and she is not able to participate in an inpatient rehab 

program. CM will follow.

 

Date Signed:  01/04/2018 03:57 PM

Electronically Signed By:Evelyne Castaneda LCSW

## 2018-01-05 NOTE — PDINTPN
Intensivist Progress Note


Assessment/Plan: 


Assessment:


77 F s/p MV redo with Maze procedure complicated by recurrent (known) PAF and 

respiratory failure. 





* Acute Respiratory failure with hypoxia and hypercapnia likely 2/2 advanced 

age with left diaphragmatic dysfunction and multiple failed extubations. Trach 

placed 12/26 by ENT in OR. Cannot rule out contribution from PNA as well given 

elevated WBC. Completed 10 days of Cefepime. Trach downsized to 6.0 this 

morning. Tolerated trach collar +/- PMSV all day yesterday, IMV at night. Vocal 

strength improved. 


* FEN- DHT placed 12/27, came out 1/4. She may be able to swallow soon, so I don

't think a PEG is needed at this time. 


* Hypernatremia- increased FW 12/29. Normalized 1/1/18


* Afib- on amiodarone and metoprolol increased 12/28, but developed rapid rate 

to 140's after walking 12/29. Now in NSR.


* s/p MVR redo, TV annuloplasty. Observe


* Anemia; Hgb stable. no signs of active bleeding.





Plan: Trach collar all day today with PMSV. Will not put back on vent during 

the day unless she has CO2 retention. BiPAP tonight, monitor EtCO2, check VBG 

in AM. Videoswallow today. ? Tx to SDU status. Will hold off on evaluation of 

left diaphragm function for now. 








01/05/18 09:59





Subjective: 


Breathing comfortably, denies dyspnea. Voice strength improving.


Objective: 





 Vital Signs











Temp Pulse Resp BP Pulse Ox


 


 36.9 C   91   20   149/69 H  94 


 


 01/05/18 05:48  01/05/18 08:00  01/05/18 08:00  01/05/18 08:00  01/05/18 08:00








 Laboratory Results





 01/02/18 05:40 





 01/02/18 05:40 





 











 01/04/18 01/05/18 01/06/18





 05:59 05:59 05:59


 


Intake Total 2476 454 


 


Output Total 1400 1150 


 


Balance 1076 -696 








 











PT  14.4 SEC (12.0-15.0)   01/02/18  05:40    


 


INR  1.10  (0.83-1.16)   01/02/18  05:40    














Physical Exam





- Physical Exam


General Appearance: alert, no apparent distress


EENT: normal ENT inspection


Neck: normal inspection


Respiratory: normal breath sounds, crackles (bases)


Cardiac/Chest: regular rate, rhythm, No edema


Abdomen: normal bowel sounds, non-tender


Skin: normal color, warm/dry


Extremities: normal inspection


Neuro/Psych: alert, normal mood/affect, oriented x 3





ICD10 Worksheet


Patient Problems: 


 Problems











Problem Status Onset


 


Acute blood loss anemia Acute  


 


Coagulopathy Acute  


 


S/P Maze operation for atrial fibrillation Acute  ~12/20/17


 


S/P mitral valve replacement with bioprosthetic valve Acute  ~12/20/17


 


S/P tricuspid valve repair Acute  ~12/20/17


 


Secondary tricuspid regurgitation Acute  


 


Severe mitral regurgitation Acute  


 


s/p placement LV epicardial lead Acute  ~12/20/17


 


Ascending aorta enlargement Chronic  


 


Atrial enlargement, bilateral Chronic  


 


Chronic anticoagulation Chronic  


 


Chronic diastolic congestive heart failure Chronic  


 


HTN (hypertension) Chronic  


 


Paroxysmal atrial fibrillation Chronic  


 


Pulmonary hypertension Chronic

## 2018-01-05 NOTE — SOAPPROG
SOAP Progress Note


Assessment/Plan: 


Assessment: S/P 12/26/17 Tracheostomy for prolonged intubation secondary to 

respiratory failure. Trach site looks good with trach positioned well. Request 

to downsize to 6 cuffed makes some sense as she's progressing, needing less 

vent intervention at night.





Trach changed to 6 cuffed portex. Tolerated well. No evidence of residual 

dressing material at site. Stay sutures removed.








Plan:  


Wound care per wound care team and nursing


Toilet per respiratory


Will be okay for trach change to cuffless trach once no vent support needed.


Please call for trach change once no vent is needed. I can be reached at my 

cell at [phone]





01/05/18 09:50





01/05/18 09:56





Subjective: 





No complaints per trach site. Mild respiratory discharge/minimal secretions.


Objective: 





 Vital Signs











Temp Pulse Resp BP Pulse Ox


 


 36.9 C   91   20   149/69 H  94 


 


 01/05/18 05:48  01/05/18 08:00  01/05/18 08:00  01/05/18 08:00  01/05/18 08:00








 Laboratory Results





 01/02/18 05:40 





 01/02/18 05:40 





 











 01/04/18 01/05/18 01/06/18





 05:59 05:59 05:59


 


Intake Total 2476 454 


 


Output Total 1400 1150 


 


Balance 1076 -696 








 











PT  14.4 SEC (12.0-15.0)   01/02/18  05:40    


 


INR  1.10  (0.83-1.16)   01/02/18  05:40    














Physical Exam





- Physical Exam


General Appearance: WD/WN, alert, no apparent distress


EENT: PERRL/EOMI


Neck: other (Trach in place. Incision site granulating appropriately. Minimal 

secretions.)


Respiratory: No respiratory distress





ICD10 Worksheet


Patient Problems: 


 Problems











Problem Status Onset


 


Acute blood loss anemia Acute  


 


Coagulopathy Acute  


 


S/P Maze operation for atrial fibrillation Acute  ~12/20/17


 


S/P mitral valve replacement with bioprosthetic valve Acute  ~12/20/17


 


S/P tricuspid valve repair Acute  ~12/20/17


 


Secondary tricuspid regurgitation Acute  


 


Severe mitral regurgitation Acute  


 


s/p placement LV epicardial lead Acute  ~12/20/17


 


Ascending aorta enlargement Chronic  


 


Atrial enlargement, bilateral Chronic  


 


Chronic anticoagulation Chronic  


 


Chronic diastolic congestive heart failure Chronic  


 


HTN (hypertension) Chronic  


 


Paroxysmal atrial fibrillation Chronic  


 


Pulmonary hypertension Chronic

## 2018-01-05 NOTE — SOAPPROG
SOAP Progress Note


Assessment/Plan: 


Assessment: POD#16 Redo median sternotomy, explant old MV ring, implant #25 

Magna mitral bioprosthesis, TVA #28 Anderson MC3 ring, CoxMaze IV, placement LV 

epicardial lead tunneled to left subclavicular space


POD#10 tracheostomy


RUE PICC





Severe MR/failed MV rpr - Ring exchanged for tissue MVR. Antithrombotic 

prophylaxis with Coumadin as per existing parameters for PAF.





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 Antithrombotic prophylaxis as per 

MVR.





Paroxysmal atrial fib/flutter, chronically anticoagulated with Coumadin - 

Predominant post CM4 rhythm sinus with PACs and intermittent PAF. Conduction 

intact and TCPWs clipped. AF prophylaxis with amiodarone. Adjunctive rate 

control with BB. Anticoagulation switched to Lovenox bridge pending decision 

PEG.





Chronic dCHF - Class II on maximal medical therapy. Off CPB without inotropic 

or pressor support. Significant volume overload gradually diuresed. Now 

euvolemic.





Ventilator dependent respiratory failure - Trach placed s/p 2 failed trials of 

extubation. Paretic left hemidiaphragm (abnormal pericardial anatomy) and 

bronchitis (possible LLL PNA) contributing factors. Chest tubes out. Course of 

IV Cefepime completed. Vent wean per pulmonology. Trach collar trials in 

progress. Need for PEG uncertain but unlikely. 





Acute expected blood loss anemia with coagulopathy - Exacerbated by lovenox 

bridge and CPB. Refractory to fairly significant correction with blood and 

blood products. Ultimately responsive to Factor VII. Thrombocytopenia resolved. 

Anticoag resumed without incident.








Plan:


Cont supportive care as per multidisciplinary team.


Downsize trach per ENT.


SLP eval.


Replace NGT prn.


Relax diuresis.








01/05/18 06:48











Subjective: 





Happy to be able to talk and pleased that her stamina appears to be improving.


Objective: 





 Vital Signs











Temp Pulse Resp BP Pulse Ox


 


 36.9 C   109 H  20   96/58 L  98 


 


 01/05/18 05:48  01/05/18 05:48  01/05/18 05:48  01/05/18 05:48  01/05/18 05:48








 Laboratory Results





 01/02/18 05:40 





 01/02/18 05:40 





 











 01/04/18 01/05/18 01/06/18





 05:59 05:59 05:59


 


Intake Total 2476 454 


 


Output Total 1400 1150 


 


Balance 1076 -696 








 











PT  14.4 SEC (12.0-15.0)   01/02/18  05:40    


 


INR  1.10  (0.83-1.16)   01/02/18  05:40    








NGT dislodged last noc and med doses missed.


Recurrent AF Rxd with IV amio and IV BB.


Trach collar well tolerated during day yest.


Neg fluid balance. Minus 4 kg overall.





Physical Exam





- Physical Exam


General Appearance: alert, no apparent distress


Respiratory: decreased breath sounds (left base o/s CTA)


Cardiac/Chest: regular rate, rhythm, other (Sternum grossly stable. Sternotomy 

and CT sites CDI)


Abdomen: non-tender, soft


Skin: warm/dry


Extremities: other (no visible edema)





ICD10 Worksheet


Patient Problems: 


 Problems











Problem Status Onset


 


Acute blood loss anemia Acute  


 


Coagulopathy Acute  


 


S/P Maze operation for atrial fibrillation Acute  ~12/20/17


 


S/P mitral valve replacement with bioprosthetic valve Acute  ~12/20/17


 


S/P tricuspid valve repair Acute  ~12/20/17


 


Secondary tricuspid regurgitation Acute  


 


Severe mitral regurgitation Acute  


 


s/p placement LV epicardial lead Acute  ~12/20/17


 


Ascending aorta enlargement Chronic  


 


Atrial enlargement, bilateral Chronic  


 


Chronic anticoagulation Chronic  


 


Chronic diastolic congestive heart failure Chronic  


 


HTN (hypertension) Chronic  


 


Paroxysmal atrial fibrillation Chronic  


 


Pulmonary hypertension Chronic

## 2018-01-05 NOTE — ASMTCMCOM
CM Note

 

CM Note                       

Notes:

Family meeting was scheduled by spiritual care for today at 7:30 AM. CM was not in attendance 

because we were not informed of the meeting which was scheduled to accomodate Dr. Anglin's 

schedule. Meeting is documented in the notes. Patient is expected to need care until next week 

sometime. Inpatient rehab continues to monitor her progress. The d/c plan remains [facility] inpatient 

rehab if patient is able. CM will follow. 

 

Date Signed:  01/05/2018 02:46 PM

Electronically Signed By:Evelyne Castaneda LCSW

## 2018-01-06 NOTE — PDINTPN
Intensivist Progress Note


Assessment/Plan: 


Assessment:


77 F s/p MV redo with Maze procedure complicated by recurrent (known) PAF and 

respiratory failure. 





* Acute Respiratory failure with hypoxia and hypercapnia likely 2/2 advanced 

age with left diaphragmatic dysfunction and multiple failed extubations. Trach 

placed 12/26 by ENT in OR. Cannot rule out contribution from PNA as well given 

elevated WBC. Completed 10 days of Cefepime. Trach downsized to 6.0 this 

morning. Tolerated trach collar +/- PMSV all day yesterday, BiPAP ST overnight. 

Vocal strength improved. CO2 up but compensated.


* FEN- DHT placed 12/27, came out 1/4. She may be able to swallow soon, so I don

't think a PEG is needed at this time. 


* Hypernatremia- increased FW 12/29. Normalized 1/1/18


* Afib- on amiodarone and metoprolol increased 12/28, but developed rapid rate 

to 140s after walking 12/29. Now in NSR.


* s/p MVR redo, TV annuloplasty. Observe


* Anemia; Hgb stable. no signs of active bleeding.





Plan: Trach collar all day today with PMSV. Will not put back on vent during 

the day unless she has CO2 retention. BiPAP tonight, monitor EtCO2, check VBG 

in AM to see if CO2 is trending up. Thoracentesis Sunday/Monday. Will hold off 

on evaluation of left diaphragm function for now. 





01/06/18 10:47





Subjective: 





Slept OK on BiPAP overnight. Breathing feels comfortable on PMSV/trach collar 

this morning.


Objective: 





 Vital Signs











Temp Pulse Resp BP Pulse Ox


 


 36.7 C   90   19   128/54 H  92 


 


 01/06/18 08:00  01/06/18 08:32  01/06/18 08:00  01/06/18 08:32  01/06/18 08:00








 Laboratory Results





 01/06/18 05:33 





 01/06/18 05:33 





 











 01/05/18 01/06/18 01/07/18





 05:59 05:59 05:59


 


Intake Total 454 720 


 


Output Total 1150 400 


 


Balance -696 320 








 











PT  16.0 SEC (12.0-15.0)  H  01/06/18  05:33    


 


INR  1.26  (0.83-1.16)  H  01/06/18  05:33    








CXR: Increased left effusion. Images reviewed by me. 





Physical Exam





- Physical Exam


General Appearance: alert, no apparent distress


EENT: normal ENT inspection


Neck: normal inspection, other (trach OK)


Respiratory: normal breath sounds, decreased breath sounds (left base)


Cardiac/Chest: regular rate, rhythm, No edema


Abdomen: normal bowel sounds, non-tender


Skin: normal color, warm/dry


Extremities: normal inspection


Neuro/Psych: alert, normal mood/affect, oriented x 3





ICD10 Worksheet


Patient Problems: 


 Problems











Problem Status Onset


 


Acute blood loss anemia Acute  


 


Coagulopathy Acute  


 


S/P Maze operation for atrial fibrillation Acute  ~12/20/17


 


S/P mitral valve replacement with bioprosthetic valve Acute  ~12/20/17


 


S/P tricuspid valve repair Acute  ~12/20/17


 


Secondary tricuspid regurgitation Acute  


 


Severe mitral regurgitation Acute  


 


s/p placement LV epicardial lead Acute  ~12/20/17


 


Ascending aorta enlargement Chronic  


 


Atrial enlargement, bilateral Chronic  


 


Chronic anticoagulation Chronic  


 


Chronic diastolic congestive heart failure Chronic  


 


HTN (hypertension) Chronic  


 


Paroxysmal atrial fibrillation Chronic  


 


Pulmonary hypertension Chronic

## 2018-01-06 NOTE — SOAPPROG
SOAP Progress Note


Assessment/Plan: 


Assessment: POD#17 Redo median sternotomy, explant old MV ring, implant #25 

Magna mitral bioprosthesis, TVA #28 Anderson MC3 ring, CoxMaze IV, placement LV 

epicardial lead tunneled to left subclavicular space


POD#11 tracheostomy


RUE PICC





Severe MR/failed MV rpr - Ring exchanged for tissue MVR. Antithrombotic 

prophylaxis with Coumadin as per existing parameters for PAF.





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 Antithrombotic prophylaxis as per 

MVR.





Paroxysmal atrial fib/flutter, chronically anticoagulated with Coumadin - 

Predominant post CM4 rhythm sinus with PACs and intermittent PAF. Conduction 

intact and TCPWs clipped. AF prophylaxis with amiodarone. Adjunctive rate 

control with BB. Lovenox bridge pending INR > 2.





Chronic dCHF - Class II on maximal medical therapy. Off CPB without inotropic 

or pressor support. Significant volume overload gradually diuresed to 

euvolemia. Small to moderate sized residual left pleural effusion to be 

assessed for thoracentesis. 





Ventilator dependent respiratory failure - Trach placed s/p 2 failed trials of 

extubation. Paretic left hemidiaphragm (abnormal pericardial anatomy) and 

bronchitis (possible LLL PNA) contributing factors. Chest tubes out. Course of 

IV Cefepime completed. Vent wean per pulmonology. Trach downsized. Swallow 

passed and on dysphagia diet. Trach collar trials with PMSV progressing. 





Acute expected blood loss anemia with coagulopathy - Exacerbated by lovenox 

bridge and CPB. Refractory to fairly significant correction with blood and 

blood products. Ultimately responsive to Factor VII. Thrombocytopenia resolved. 

Anticoag resumed without incident.








Plan:


Cont supportive care as per multidisciplinary team.


Noncontrast CT to corroborate presence left pleural effusion.


If sig effusion, US guided left thoracentesis Sun or Mon.


Replete K.


Cont lower dose daily lasix.


Consider inc metop to 100mg BID.








01/06/18 08:23














Subjective: 





Feels well. Comfortable. Enjoying smoothies.


Objective: 





 Vital Signs











Temp Pulse Resp BP Pulse Ox


 


 36.3 C   75   19   140/70 H  96 


 


 01/06/18 04:00  01/06/18 04:00  01/06/18 04:00  01/06/18 04:00  01/06/18 04:00








 Laboratory Results





 01/06/18 05:33 





 01/06/18 05:33 





 











 01/05/18 01/06/18 01/07/18





 05:59 05:59 05:59


 


Intake Total 454 720 


 


Output Total 1150 400 


 


Balance -696 320 








 











PT  16.0 SEC (12.0-15.0)  H  01/06/18  05:33    


 


INR  1.26  (0.83-1.16)  H  01/06/18  05:33    








Holding SR. Resting rates 60s-70s. Inc HR and BP w activity.


Trach collar during day, BiPAP at night.


CXR sugg small-mod left pl effusion.














Physical Exam





- Physical Exam


General Appearance: alert, no apparent distress


Respiratory: decreased breath sounds (LLL, o/w CTA)


Cardiac/Chest: regular rate, rhythm, other (Sternotomy and CT sites CDI)


Abdomen: non-tender, soft


Skin: warm/dry


Extremities: other (no visible edema)





ICD10 Worksheet


Patient Problems: 


 Problems











Problem Status Onset


 


Acute blood loss anemia Acute  


 


Coagulopathy Acute  


 


S/P Maze operation for atrial fibrillation Acute  ~12/20/17


 


S/P mitral valve replacement with bioprosthetic valve Acute  ~12/20/17


 


S/P tricuspid valve repair Acute  ~12/20/17


 


Secondary tricuspid regurgitation Acute  


 


Severe mitral regurgitation Acute  


 


s/p placement LV epicardial lead Acute  ~12/20/17


 


Ascending aorta enlargement Chronic  


 


Atrial enlargement, bilateral Chronic  


 


Chronic anticoagulation Chronic  


 


Chronic diastolic congestive heart failure Chronic  


 


HTN (hypertension) Chronic  


 


Paroxysmal atrial fibrillation Chronic  


 


Pulmonary hypertension Chronic

## 2018-01-07 NOTE — SOAPPROG
SOAP Progress Note


Assessment/Plan: 


Assessment: POD#18 Redo median sternotomy, explant old MV ring, implant #25 

Magna mitral bioprosthesis, TVA #28 Anderson MC3 ring, CoxMaze IV, placement LV 

epicardial lead tunneled to left subclavicular space


POD#12 tracheostomy


RUE PICC





Severe MR/failed MV rpr - Ring exchanged for tissue MVR. Antithrombotic 

prophylaxis with Coumadin as per existing parameters for PAF.





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 Antithrombotic prophylaxis as per 

MVR.





Paroxysmal atrial fib/flutter, chronically anticoagulated with Coumadin - 

Predominant post CM4 rhythm sinus with PACs and intermittent PAF. Conduction 

intact and TCPWs clipped. AF prophylaxis with amiodarone. Adjunctive rate 

control with BB. Lovenox bridge pending INR > 2.





Chronic dCHF - Class II on maximal medical therapy. Off CPB without inotropic 

or pressor support. Significant volume overload gradually diuresed to 

euvolemia. Moderate sized residual left pleural effusion to be tapped. 





Ventilator dependent respiratory failure - Trach placed s/p 2 failed trials of 

extubation. Paretic left hemidiaphragm (abnormal pericardial anatomy) and 

bronchitis (possible LLL PNA) contributing factors. Chest tubes out. Course of 

IV Cefepime completed. Vent wean per pulmonology. Trach downsized. Swallow 

passed and on dysphagia diet. Trach collar trials with PMSV progressing. 





Acute expected blood loss anemia with coagulopathy - Exacerbated by lovenox 

bridge and CPB. Refractory to fairly significant correction with blood and 

blood products. Ultimately responsive to Factor VII. Thrombocytopenia resolved. 

Anticoag resumed without incident.








Plan:


Cont supportive care as per multidisciplinary team.


US guided left thoracentesis.


Cont metoprolol tartrate 100mg BID.


Intensify diuresis.


Trial anxiolytic. If good response, begin Xanax TID prn.











01/07/18 08:01











Subjective: 





Panic attack this morning. All sorts of worries creeping up. Unable to talk 

herself down. Aware of effect on HR and more anxiety provoked.


Objective: 





 Vital Signs











Temp Pulse Resp BP Pulse Ox


 


 36.8 C   124 H  31 H  131/58 H  93 


 


 01/07/18 04:00  01/07/18 07:33  01/07/18 07:33  01/07/18 07:33  01/07/18 07:33








 Laboratory Results





 01/06/18 05:33 





 01/07/18 04:10 





 











 01/06/18 01/07/18 01/08/18





 05:59 05:59 05:59


 


Intake Total 720 1400 


 


Output Total 400 400 


 


Balance 320 1000 








 











PT  15.5 SEC (12.0-15.0)  H  01/07/18  04:10    


 


INR  1.21  (0.83-1.16)  H  01/07/18  04:10    








Intermittent AF exacerbated by trach suctioning or anxiety.


BB inc last noc and well tolerated.


CT chest confirmed sizeable left pleural effusion w venous congestion and 

ascites concerning for CHF. 











Physical Exam





- Physical Exam


General Appearance: alert, mild distress (emotional)


Respiratory: decreased breath sounds (left base)


Cardiac/Chest: irregularly irregular (AF rates 110s-120s, converting to SR 70s-

80s during exam), other (Sternotomy CDI. Small hematoma LV epicardial ld pocket 

)


Abdomen: non-tender, soft


Skin: warm/dry


Extremities: other (no visible edema)





ICD10 Worksheet


Patient Problems: 


 Problems











Problem Status Onset


 


Acute blood loss anemia Acute  


 


Coagulopathy Acute  


 


S/P Maze operation for atrial fibrillation Acute  ~12/20/17


 


S/P mitral valve replacement with bioprosthetic valve Acute  ~12/20/17


 


S/P tricuspid valve repair Acute  ~12/20/17


 


Secondary tricuspid regurgitation Acute  


 


Severe mitral regurgitation Acute  


 


s/p placement LV epicardial lead Acute  ~12/20/17


 


Ascending aorta enlargement Chronic  


 


Atrial enlargement, bilateral Chronic  


 


Chronic anticoagulation Chronic  


 


Chronic diastolic congestive heart failure Chronic  


 


HTN (hypertension) Chronic  


 


Paroxysmal atrial fibrillation Chronic  


 


Pulmonary hypertension Chronic

## 2018-01-07 NOTE — GPN
[f rep st]



                                                                 PROCEDURE NOTE





DATE OF PROCEDURE:  01/07/2018



PROCEDURE PERFORMED:  Left thoracentesis.



REASON FOR PROCEDURE:  Symptomatic pleural effusion.



PROCEDURE NOTE:  The risks and benefits of the procedure were explained to the patient, who agreed to
 proceed.  The entire procedure was performed in the intensive care unit with the patient under blood
 pressure, EKG, and oximetry monitoring.  After appropriate time-out, the patient's back was ausculta
eugenia, percussed, then interrogated with ultrasound, and an appropriate location was identified.  The a
alberta was then prepped and draped in the usual sterile fashion.  A finder needle was used to inject lid
ocaine subcutaneously, and the pleural space was entered, with return of bloody fluid.  This needle w
as then withdrawn and a small dermatome incision was made.  A 5-Kinyarwanda catheter was then inserted ove
r a needle, and the needle was then removed.  650 cc of fluid was removed.  The patient tolerated the
 procedure well.  The fluid will be sent for hematocrit.





Job #:  [id number]

## 2018-01-07 NOTE — PDINTPN
Intensivist Progress Note


Assessment/Plan: 


Assessment:


77 F s/p MV redo with Maze procedure complicated by recurrent (known) PAF and 

respiratory failure. 





* Acute Respiratory failure with hypoxia and hypercapnia likely 2/2 advanced 

age with left diaphragmatic dysfunction and multiple failed extubations. Trach 

placed 12/26 by ENT in OR. Cannot rule out contribution from PNA as well given 

elevated WBC. Completed 10 days of Cefepime. Trach downsized to 6.0 this 

morning. Tolerated trach collar +/- PMSV all day yesterday, BiPAP ST overnight. 

Vocal strength improved. CO2 continues to trend up but pH compensated.


* FEN- DHT placed 12/27, came out 1/4. She may be able to swallow soon, so I don

't think a PEG is needed at this time. 


* Hypernatremia- increased FW 12/29. Normalized 1/1/18


* Afib- on amiodarone and metoprolol increased 12/28, but developed rapid rate 

to 140s after walking 12/29. Now in NSR.


* s/p MVR redo, TV annuloplasty. Observe


* Anemia; Hgb stable. no signs of active bleeding.





Plan: Trach collar all day today with PMSV. Will not put back on vent/BiPAP 

tonight unless she has signs of CO2 retention. Check VBG in AM to see if CO2 is 

trending up. Thoracentesis today if bedside US shows enough fluid. Will hold 

off on evaluation of left diaphragm function for now. 





01/07/18 10:57





Subjective: 





Slept OK without BiPAP overnight. Denies dyspnea. Some anxiety this AM, got 

Ativan, which helped but now she feels a bit loopy.


Objective: 





 Vital Signs











Temp Pulse Resp BP Pulse Ox


 


 36.8 C   124 H  31 H  131/58 H  93 


 


 01/07/18 04:00  01/07/18 07:33  01/07/18 07:33  01/07/18 07:33  01/07/18 07:33








 Laboratory Results





 01/06/18 05:33 





 01/07/18 04:10 





 











 01/06/18 01/07/18 01/08/18





 05:59 05:59 05:59


 


Intake Total 720 1400 


 


Output Total 400 400 


 


Balance 320 1000 








 











PT  15.5 SEC (12.0-15.0)  H  01/07/18  04:10    


 


INR  1.21  (0.83-1.16)  H  01/07/18  04:10    








CT Chest 1/6/18: Moderate left effusion. Consolidation/compression LLL with 

bronchiectasis. Ground glass infiltrates MARIA A. Images reviewed by me. 


 Laboratory Tests











  01/07/18





  04:10


 


VBG pH  7.36


 


VBG HCO3  33 H


 


VBG Total CO2  35 H


 


VBG O2 Saturation  63 L


 


Mixed VBG pCO2  60 H


 


Mixed VBG pO2  37














Physical Exam





- Physical Exam


General Appearance: alert, no apparent distress


EENT: normal ENT inspection


Neck: normal inspection


Respiratory: No normal breath sounds (bronnchial BS and rales left base. )


Cardiac/Chest: regular rate, rhythm, No edema


Abdomen: normal bowel sounds, non-tender, soft


Skin: normal color, warm/dry


Extremities: normal inspection


Neuro/Psych: alert, normal mood/affect, oriented x 3





ICD10 Worksheet


Patient Problems: 


 Problems











Problem Status Onset


 


Acute blood loss anemia Acute  


 


Coagulopathy Acute  


 


S/P Maze operation for atrial fibrillation Acute  ~12/20/17


 


S/P mitral valve replacement with bioprosthetic valve Acute  ~12/20/17


 


S/P tricuspid valve repair Acute  ~12/20/17


 


Secondary tricuspid regurgitation Acute  


 


Severe mitral regurgitation Acute  


 


s/p placement LV epicardial lead Acute  ~12/20/17


 


Ascending aorta enlargement Chronic  


 


Atrial enlargement, bilateral Chronic  


 


Chronic anticoagulation Chronic  


 


Chronic diastolic congestive heart failure Chronic  


 


HTN (hypertension) Chronic  


 


Paroxysmal atrial fibrillation Chronic  


 


Pulmonary hypertension Chronic

## 2018-01-08 NOTE — SOAPPROG
SOAP Progress Note


Assessment/Plan: 


POD #19: Redo median sternotomy, explant old MV ring, implant #25 Magna mitral 

bioprosthesis, TVA #28 Anderson MC3 ring, CoxMaze IV, placement LV epicardial 

lead tunneled to left subclavicular space





POD #13: Open tracheostomy placement 





Severe MR/failed MV rpr - Ring exchanged for tissue MVR. Antithrombotic 

prophylaxis as per CM 4.





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 Antithrombotic prophylaxis as per 

CM 4.





Paroxysmal atrial fib/flutter s/p CM 4 -  continue amiodarone and BB. 

Thromboprophylaxis with BID Lovenox.  





Class II Chronic dCHF  Continue Lasix/BB. 





Ventilator dependent respiratory failure - s/p trach placement weaned to TC. 

Antibiotic course completed for E. coli in sputum. 


 


Acute blood loss anemia with resolved thrombocytopenia and coagulopathy - 

stable. 





DVT prophylaxis - Continue Lovenox BID.  





Lines/drains - RUE PICC





Nutrition - tube feeds successfully transitioned to PO








Subjective: 


Denies SOB. Feels calmer since Xanax started.


Objective: 





 Vital Signs











Temp Pulse Resp BP Pulse Ox


 


 36.8 C   59 L  22 H  117/48 L  92 


 


 01/08/18 03:26  01/08/18 03:26  01/08/18 03:26  01/08/18 03:26  01/08/18 03:26








 Laboratory Results





 01/06/18 05:33 





 01/08/18 03:20 





 











 01/07/18 01/08/18 01/09/18





 05:59 05:59 05:59


 


Intake Total 1400 600 


 


Output Total 400 550 


 


Balance 1000 50 








 











PT  15.5 SEC (12.0-15.0)  H  01/07/18  04:10    


 


INR  1.21  (0.83-1.16)  H  01/07/18  04:10    














Physical Exam





- Physical Exam


General Appearance: WD/WN, alert, no apparent distress


EENT: No scleral icterus (R), No scleral icterus (L)


Neck: other (trach in place)


Respiratory: No respiratory distress


Cardiac/Chest: irregularly irregular


Abdomen: non-tender, soft, No distended


Skin: normal color, warm/dry


Extremities: No pedal edema


Neuro/Psych: no motor/sensory deficits, alert, normal mood/affect, oriented x 3





ICD10 Worksheet


Patient Problems: 


 Problems











Problem Status Onset


 


Acute blood loss anemia Acute  


 


Coagulopathy Acute  


 


S/P Maze operation for atrial fibrillation Acute  ~12/20/17


 


S/P mitral valve replacement with bioprosthetic valve Acute  ~12/20/17


 


S/P tricuspid valve repair Acute  ~12/20/17


 


Secondary tricuspid regurgitation Acute  


 


Severe mitral regurgitation Acute  


 


s/p placement LV epicardial lead Acute  ~12/20/17


 


Ascending aorta enlargement Chronic  


 


Atrial enlargement, bilateral Chronic  


 


Chronic anticoagulation Chronic  


 


Chronic diastolic congestive heart failure Chronic  


 


HTN (hypertension) Chronic  


 


Paroxysmal atrial fibrillation Chronic  


 


Pulmonary hypertension Chronic

## 2018-01-08 NOTE — PDINTPN
Intensivist Progress Note


Assessment/Plan: 


Assessment:





77 F s/p MV redo with Maze procedure complicated by recurrent (known) PAF and 

respiratory failure. 





* Acute Respiratory failure with hypoxia and hypercapnia likely 2/2 advanced 

age with left diaphragmatic dysfunction and multiple failed extubations. Trach 

placed 12/26 by ENT in OR. Cannot rule out contribution from PNA as well given 

elevated WBC. Completed 10 days of Cefepime. Trach downsized to 6.0 1/7. 

Tolerating being off the ventilator for now. Vocal strength improved. CO2 

continues to run high, approximately 50 with a compensated pH.





* FEN- eating, but appetite not the best yet.





* Hypernatremia- increased FW 12/29. Normalized 1/1/18





* Afib- on amiodarone and metoprolol.  Oral diltiazem added yesterday. In NSR 

now.





* s/p MVR redo, TV annuloplasty.  On full-dose anticoagulation but INR low.  

Per [facility]





* Anemia; Hgb stable. no signs of active bleeding.





* GI prophylaxis- pantoprazole.











Plan:  Continue trach collar today, but I will have her off her PM valve more 

to decrease her work of breathing.  I will plan not to put her back on the 

ventilator again tonight and see how she does.  Back on ventilator of course if 

further respiratory compromise occurs.  Will check an arterial blood gas again 

in the morning to see if her pCO2 is continuing to rise above 50.  Continue 

bronchopulmonary therapies.  Continue supportive care.  Increase ambulation as 

tolerated.








35 min of critical care time spent directly with the patient.  Discussed with 

cardiovascular surgery, nursing, and respiratory therapy as well as with the 

patient.  Radiologic studies reviewed.








Subjective: 





Doing well.  Up in the chair.  Remains somewhat dyspneic.  Talking with the PM 

valve in place.


Objective: 





 Vital Signs











Temp Pulse Resp BP Pulse Ox


 


 36.4 C   76   16   96/44 L  95 


 


 01/08/18 11:50  01/08/18 12:00  01/08/18 12:00  01/08/18 12:00  01/08/18 12:00








 Laboratory Results





 01/06/18 05:33 





 01/08/18 03:20 





 











 01/07/18 01/08/18 01/09/18





 05:59 05:59 05:59


 


Intake Total 1400 1100 


 


Output Total 400 552 


 


Balance 1000 548 








 











PT  17.1 SEC (12.0-15.0)  H  01/08/18  06:15    


 


INR  1.38  (0.83-1.16)  H  01/08/18  06:15    











 Laboratory Tests











  01/08/18





  11:10


 


pCO2  51 H


 


pO2  57 L


 


ABG pH  7.38


 


ABG O2 Saturation  90 L


 


O2 Concentration %  35








CXR:  Large heart, with retrocardiac atelectasis and/or persistent effusion.





Physical Exam





- Physical Exam


General Appearance: alert, no apparent distress, other (Up in the chair)


EENT: PERRL/EOMI, ET tube (Tracheostomy, PM valve in trach collar in place)


Neck: normal inspection (No JVD)


Respiratory: decreased breath sounds (More decreased at right base), rales (

ompared with the left.  Primarily at the left base), No respiratory distress, 

No rhonchi, No pleural rub


Cardiac/Chest: regular rate, rhythm, systolic murmur


Abdomen: normal bowel sounds, non-tender, soft


Skin: normal color, warm/dry


Extremities: No pedal edema


Neuro/Psych: no motor/sensory deficits, No cognition abnormalities





ICD10 Worksheet


Patient Problems: 


 Problems











Problem Status Onset


 


Acute blood loss anemia Acute  


 


Coagulopathy Acute  


 


S/P Maze operation for atrial fibrillation Acute  ~12/20/17


 


S/P mitral valve replacement with bioprosthetic valve Acute  ~12/20/17


 


S/P tricuspid valve repair Acute  ~12/20/17


 


Secondary tricuspid regurgitation Acute  


 


Severe mitral regurgitation Acute  


 


s/p placement LV epicardial lead Acute  ~12/20/17


 


Ascending aorta enlargement Chronic  


 


Atrial enlargement, bilateral Chronic  


 


Chronic anticoagulation Chronic  


 


Chronic diastolic congestive heart failure Chronic  


 


HTN (hypertension) Chronic  


 


Paroxysmal atrial fibrillation Chronic  


 


Pulmonary hypertension Chronic

## 2018-01-08 NOTE — ASMTCMCOM
CM Note

 

CM Note                       

Notes:

Patient continues to have respiratory issues and needs bipap at night. Talked about LTAC in ICU 

Rounds. Met with patient and her daughter Sarah and discussed the LTAC's they would like a referral 


sent to [facility]. Referral sent.

 

Date Signed:  01/08/2018 03:27 PM

Electronically Signed By:Poonam Dawkins LCSW

## 2018-01-09 NOTE — PDINTPN
Intensivist Progress Note


Assessment/Plan: 


Assessment:





77 F s/p MV redo with Maze procedure complicated by recurrent (known) PAF and 

respiratory failure. 





* Acute Respiratory failure, recurrent, with hypoxia and hypercapnia likely 2/2 

advanced age with left diaphragmatic dysfunction and multiple failed 

extubations. Trach placed 12/26 by ENT in OR. Cannot rule out contribution from 

PNA as well initially given elevated WBC. Completed 10 days of Cefepime. Trach 

downsized to 6.0 1/7. Tolerating being off the ventilator for periods of time 

but recurrent episodes of respiratory failure occur, probably secondary to 

respiratory muscle fatigue.  No evidence for mucous plugging or a primary 

cardiac event. CO2 higher this morning after being off ventilatory support 

overnight, 68 with a pH of 7.33.  She was back on BiPAP for a while, taken off, 

was doing well on a trach collar with her PM valve in place when she has a 

recurrent episode of respiratory failure.  Back on BiPAP now.





* FEN- eating, but appetite not great.





* Hypernatremia- increased FW 12/29. Normal when last checked





* Afib- on amiodarone and metoprolol.  Oral diltiazem added. In NSR now.





* s/p MVR redo, TV annuloplasty.  On full-dose anticoagulation but INR low.  

Per [facility]





* Anemia; Hgb stable. no signs of active bleeding.





* GI prophylaxis- pantoprazole.











Plan:  Continue with trach collar during the day, BiPAP support at night and 

p.r.n. in the day if needed.  Continue bronchopulmonary therapies.  Continue 

supportive care.  Increase ambulation.  In light of her recurrent respiratory 

failure episodes she will be unable at this time to go to inpatient rehab.  She 

will need a long-term acute care facility.  She needs to stay in the intensive 

care unit and monitored closely secondary to these events.








40 min of critical care time spent directly with the patient.  Discussed with 

cardiovascular surgery, nursing, and respiratory therapy as well as with the 

patient.  











Subjective: 





Doing okay initially this morning.  Then had an episode of respiratory failure 

following physical therapy.  Placed back on ventilatory support.  


Objective: 





 Vital Signs











Temp Pulse Resp BP Pulse Ox


 


 37.0 C   81   24 H  102/65   99 


 


 01/09/18 08:00  01/09/18 10:02  01/09/18 08:00  01/09/18 10:02  01/09/18 08:00








 Laboratory Results





 01/06/18 05:33 





 01/08/18 03:20 





 











 01/08/18 01/09/18 01/10/18





 05:59 05:59 05:59


 


Intake Total 1100 1900 


 


Output Total 552  775


 


Balance 548 1900 -775








 











PT  21.2 SEC (12.0-15.0)  H  01/09/18  05:00    


 


INR  1.82  (0.83-1.16)  H  01/09/18  05:00    











 Laboratory Tests











  01/09/18 01/09/18





  05:00 05:02


 


INR  1.82 H 


 


pCO2   68 H


 


pO2   60 L


 


Total CO2   37 H


 


ABG pH   7.33 L


 


O2 Concentration %   40








CXR:  Small area of left lower lobe atelectasis with small effusion.  No acute 

changes





Physical Exam





- Physical Exam


General Appearance: alert, no apparent distress, thin


EENT: other (Tracheostomy in place)


Neck: other (Tracheostomy tube, PM valve)


Respiratory: lungs clear, decreased breath sounds, No rales, No rhonchi (No 

significant secretions), No stridor, No wheezing


Cardiac/Chest: regular rate, rhythm, No irregularly irregular (No episodes of 

rapid AF.)


Abdomen: normal bowel sounds, non-tender, soft


Pelvic Exam: other (No Boyd catheter)


Skin: warm/dry, pallor


Extremities: No pedal edema


Neuro/Psych: no motor/sensory deficits, No cognition abnormalities





ICD10 Worksheet


Patient Problems: 


 Problems











Problem Status Onset


 


s/p placement LV epicardial lead Acute  ~12/20/17


 


HTN (hypertension) Chronic  


 


Chronic diastolic congestive heart failure Chronic  


 


Coagulopathy Acute  


 


Acute blood loss anemia Acute  


 


S/P Maze operation for atrial fibrillation Acute  ~12/20/17


 


S/P tricuspid valve repair Acute  ~12/20/17


 


S/P mitral valve replacement with bioprosthetic valve Acute  ~12/20/17


 


Chronic anticoagulation Chronic  


 


Paroxysmal atrial fibrillation Chronic  


 


Pulmonary hypertension Chronic  


 


Ascending aorta enlargement Chronic  


 


Atrial enlargement, bilateral Chronic  


 


Secondary tricuspid regurgitation Acute  


 


Severe mitral regurgitation Acute

## 2018-01-09 NOTE — SOAPPROG
SOAP Progress Note


Assessment/Plan: 


Assessment: POD#20 Redo median sternotomy, explant old MV ring, implant #25 

Magna mitral bioprosthesis, TVA #28 Anderson MC3 ring, CoxMaze IV, placement LV 

epicardial lead tunneled to left subclavicular space


POD#14 tracheostomy


POD#2 left thoracentesis, 650 ml


RUE PICC





Severe MR/failed MV rpr - Ring exchanged for tissue MVR. Antithrombotic 

prophylaxis with Coumadin as per existing parameters for PAF.





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 Antithrombotic prophylaxis as per 

MVR.





Paroxysmal atrial fib/flutter, chronically anticoagulated with Coumadin - 

Predominant post CM4 rhythm sinus with PACs and intermittent PAF. Conduction 

intact and TCPWs clipped. AF prophylaxis with amiodarone, BB, and CCB. 

Antithrombotic prophylaxis with coumadin. Lovenox bridge pending INR > 2.





Chronic dCHF - Class II on maximal medical therapy. Off CPB without inotropic 

or pressor support. Significant volume overload gradually diuresed. Moderate 

sized left pleural effusion tapped. 





Ventilator dependent respiratory failure - Trach placed s/p 2 failed trials of 

extubation. Paretic left hemidiaphragm (abnormal pericardial anatomy) and 

bronchitis (possible LLL PNA) contributing factors. Chest tubes out. Course of 

IV Cefepime completed. Vent wean per pulmonology. Trach downsized. Swallow 

passed and on dysphagia diet. Trach collar trials with PMSV progressing. Cont 

to retain CO2, no imminent plans for extubation.





Acute expected blood loss anemia with coagulopathy - Exacerbated by lovenox and 

CPB. Refractory to fairly significant correction with blood and blood products. 

Ultimately responsive to Factor VII. Thrombocytopenia resolved. Anticoag 

resumed without incident.








Plan:


Cont supportive care as per multidisciplinary team.


Decrease metoprolol tartrate to 50 mg BID.


Cont Lovenox x 1 more day.


PA & lat CXR in am.


Dispo - LTAC when bed available.











01/09/18 07:28














Subjective: 





Tired of being tired. Disheartened by stall in respiratory progress. Very 

little appetite.


Objective: 





 Vital Signs











Temp Pulse Resp BP Pulse Ox


 


 36.8 C   86   14   125/60 H  100 


 


 01/09/18 04:00  01/09/18 05:39  01/09/18 05:39  01/09/18 04:52  01/09/18 05:39








 Laboratory Results





 01/06/18 05:33 





 01/08/18 03:20 





 











 01/08/18 01/09/18 01/10/18





 05:59 05:59 05:59


 


Intake Total 1100 1900 


 


Output Total 552  


 


Balance 548 1900 








 











PT  21.2 SEC (12.0-15.0)  H  01/09/18  05:00    


 


INR  1.82  (0.83-1.16)  H  01/09/18  05:00    








Metoprolol held yest d/t low BP.


PAF this am when pCO2 60 on ABG.


CXR-> tiny rt and small left pleural effusions, no pulm vasc congestion.





Physical Exam





- Physical Exam


General Appearance: alert, no apparent distress


Respiratory: decreased breath sounds (left base)


Cardiac/Chest: regular rate, rhythm, other (Sternotomy and CT sites CDI)


Abdomen: soft


Skin: warm/dry


Extremities: other (no visible edema)





ICD10 Worksheet


Patient Problems: 


 Problems











Problem Status Onset


 


Acute blood loss anemia Acute  


 


Coagulopathy Acute  


 


S/P Maze operation for atrial fibrillation Acute  ~12/20/17


 


S/P mitral valve replacement with bioprosthetic valve Acute  ~12/20/17


 


S/P tricuspid valve repair Acute  ~12/20/17


 


Secondary tricuspid regurgitation Acute  


 


Severe mitral regurgitation Acute  


 


s/p placement LV epicardial lead Acute  ~12/20/17


 


Ascending aorta enlargement Chronic  


 


Atrial enlargement, bilateral Chronic  


 


Chronic anticoagulation Chronic  


 


Chronic diastolic congestive heart failure Chronic  


 


HTN (hypertension) Chronic  


 


Paroxysmal atrial fibrillation Chronic  


 


Pulmonary hypertension Chronic

## 2018-01-09 NOTE — ASMTCMCOM
CM Note

 

CM Note                       

Notes:

No CO LTAC waiting for auth from [employer].

 

Date Signed:  01/09/2018 02:44 PM

Electronically Signed By:Poonam Dawkins LCSW

## 2018-01-09 NOTE — WOCRNPDOC
WOCRN Advanced Assessment Note





- Skin Integrity Problem, Advanced Assess


  ** Anterior Neck


Dressing Type: Polymem


Dressing Description: Intact


Exudate Amount: None


Exudate Characteristic(s): None


Integumentary Issue Intervention: Visualized Under Dressing


Josette Wound Tissue: Blanching, Erythema


Josette Wound Swelling: Mild


Wound Bed Color: Yellow


Wound Bed Constitution: Adhered Slough


Site Odor: None


Site Measurement - Head-to-Toe Length X Width X Depth (cm): 0.4cmx0.8cmx slough


Pressure Injury Stage: Unstageable, Medical Device Related Pressure Injury (

trach face plate)


Pressure Injury Present on Admit: No (Physican notified)


Skin Integrity Problem Comment: Slough-filled wound noted just below trach os, 

no exudate observed. Blanching erythema in periwound skin. Nursing placed a 

Polymem foam dressing, which is an appropriate intervention to continue. 

Hydrogel added to order to facilitate softening of necrosis. Wound care will 

continue to follow.





  ** Left Medial Gluteal Cleft Pressure Injury


Dressing Type: Allevyn Life


Dressing Description: Not Intact


Exudate Amount: None


Exudate Characteristic(s): None


Integumentary Issue Intervention: Barrier Cream Applied (Calazime)


Josette Wound Tissue: Blanching, Erythema


Josette Wound Swelling: None


Wound Bed Color: Red


Wound Bed Constitution: Red/Pink - Non Granular Tissue


Site Odor: None


Site Measurement - Head-to-Toe Length X Width X Depth (cm): 0.7cmx0.5cmx0.1cm


Pressure Injury Stage: Stage 2


Pressure Injury Present on Admit: No (Physician notified)


Skin Integrity Problem Comment: Small area of partial-thickness tissue loss 

noted to the R of patient's coccyx in the innermost L gluteal cleft, appearance 

consistent w/ stage 2 pressure injury. Wound margins are friable, and periwound 

skin is presently intact and blanching. Dressing applied by nursing  was not 

intact over the wound, because this is a difficult site for a dressing to 

adhere. Changed order to Calazime skin protectant BID, and wrote orders for 

specialty mattress and pressure-relieving chair cushion.

## 2018-01-10 NOTE — ASMTCMCOM
CM Note

 

CM Note                       

Notes:

Spoke with [facility] who states they are waiting on insurance auth for 

patient. Roseann in admissions ([phone]) states she will call me when they get it. Spoke with 

Delmy also who requested updates on patient's progress. Updates were faxed to her this 

morning. Called admissions again this afternoon to check again but the insurance company has still 

not responded. CM will follow.

 

Date Signed:  01/10/2018 03:26 PM

Electronically Signed By:Evelyne Castaneda LCSW

## 2018-01-10 NOTE — SOAPPROG
SOAP Progress Note


Assessment/Plan: 


POD #21: Redo median sternotomy, explant old MV ring, implant #25 Magna mitral 

bioprosthesis, TVA #28 Anderson MC3 ring, CoxMaze IV, placement LV epicardial 

lead tunneled to left subclavicular space


POD #15: Open tracheostomy placement 


POD #3: left thoracentesis, 650 cc





Severe MR/failed MV rpr - Ring exchanged for tissue MVR. Antithrombotic 

prophylaxis as per CM 4.





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 Antithrombotic prophylaxis as per 

CM 4.





Paroxysmal atrial fib/flutter s/p CM 4 -  continue amiodarone and BB. 

Thromboprophylaxis with BID Lovenox.  





Class II Chronic dCHF  Continue Lasix/BB. 





Ventilator dependent respiratory failure - s/p trach placement weaned to TC/

BiPAP. Questionable episode of hypoxemia yesterday with quick return to 100% 

saturation. Antibiotic course completed for E. coli in sputum. 


 


Acute blood loss anemia with resolved thrombocytopenia and coagulopathy - 

stable. 





DVT prophylaxis - Continue Coumadin/SCDs.  





Lines/drains - RUE PICC





Nutrition - tube feeds successfully transitioned to PO





Disposition - plan for LTAC later this week. 





Subjective: 


Resting comfortably. 


Objective: 





 Vital Signs











Temp Pulse Resp BP Pulse Ox


 


 37.0 C   77   20   120/54 L  98 


 


 01/09/18 08:00  01/10/18 03:35  01/10/18 03:35  01/10/18 00:00  01/10/18 03:35








 Laboratory Results





 01/10/18 03:20 





 01/10/18 03:20 





 











 01/09/18 01/10/18 01/11/18





 05:59 05:59 05:59


 


Intake Total 1900 1260 


 


Output Total  775 


 


Balance 1900 485 








 











PT  28.8 SEC (12.0-15.0)  H  01/10/18  03:20    


 


INR  2.72  (0.83-1.16)  H  01/10/18  03:20    














Physical Exam





- Physical Exam


General Appearance: WD/WN, alert, no apparent distress


EENT: No scleral icterus (R), No scleral icterus (L)


Neck: other (trach in place)


Respiratory: No respiratory distress


Cardiac/Chest: regular rate, rhythm


Abdomen: No distended


Skin: normal color, warm/dry


Neuro/Psych: no motor/sensory deficits, alert, normal mood/affect, oriented x 3





ICD10 Worksheet


Patient Problems: 


 Problems











Problem Status Onset


 


Acute blood loss anemia Acute  


 


Coagulopathy Acute  


 


S/P Maze operation for atrial fibrillation Acute  ~12/20/17


 


S/P mitral valve replacement with bioprosthetic valve Acute  ~12/20/17


 


S/P tricuspid valve repair Acute  ~12/20/17


 


Secondary tricuspid regurgitation Acute  


 


Severe mitral regurgitation Acute  


 


s/p placement LV epicardial lead Acute  ~12/20/17


 


Ascending aorta enlargement Chronic  


 


Atrial enlargement, bilateral Chronic  


 


Chronic anticoagulation Chronic  


 


Chronic diastolic congestive heart failure Chronic  


 


HTN (hypertension) Chronic  


 


Paroxysmal atrial fibrillation Chronic  


 


Pulmonary hypertension Chronic

## 2018-01-10 NOTE — PDINTPN
Intensivist Progress Note


Assessment/Plan: 


Assessment:





77 F s/p MV redo with Maze procedure complicated by recurrent (known) PAF and 

respiratory failure. 





* Acute Respiratory failure, recurrent, with hypoxia and hypercapnia likely 2/2 

advanced age with diaphragmatic dysfunction and multiple failed extubations. 

Trach placed 12/26 by ENT in OR. Cannot rule out contribution from PNA as well 

initially given elevated WBC. Completed 10 days of Cefepime. Trach downsized to 

6.0 1/7. Tolerating being off the ventilator for periods of time but recurrent 

episodes of respiratory failure occur, probably secondary to respiratory muscle 

fatigue.  No evidence for mucous plugging or a primary cardiac event. CO2 

higher yesterday morning after being off ventilatory support overnight, 68 with 

a pH of 7.33.  She is back on BiPAP now at night, but is refusing it during the 

day.





* FEN- eating, appetite a bit better.





* Hypernatremia- increased FW 12/29.  Normal at 139 today





* Afib- on amiodarone and metoprolol, oral diltiazem. In NSR...





* s/p MVR redo, TV annuloplasty.  On full-dose anticoagulation but INR low.  

Per [facility]





* Anemia; Hgb stable. no signs of active bleeding.





* GI prophylaxis- pantoprazole.











Plan:  Continue with trach collar during the day, BiPAP support at night and 

p.r.n. in the day if needed.  Continue bronchopulmonary therapies.  Continue 

supportive care.  Increase ambulation.  In light of her recurrent respiratory 

failure episodes she will be unable to go to inpatient rehab.  LTAC placement 

in the works.  She needs to stay in the intensive care unit and monitored 

closely secondary to respiratory events.  If secretions increase I will 

consider a bronchoscopy.  Will re-evaluate in the a.m..








30 min of critical care time spent directly with the patient.  Discussed with 

cardiovascular surgery, nursing, and respiratory therapy as well as with the 

patient.  











Subjective: 





Doing okay, off BiPAP today.  Able to cough up a small amount of mucus.  She 

feels she is handling this okay.  She feels breathing is better today.


Objective: 





 Vital Signs











Temp Pulse Resp BP Pulse Ox


 


 36.6 C   76   24 H  124/57 H  95 


 


 01/10/18 12:00  01/10/18 16:00  01/10/18 16:00  01/10/18 16:00  01/10/18 16:00








 Laboratory Results





 01/10/18 03:20 





 01/10/18 03:20 





 











 01/09/18 01/10/18 01/11/18





 05:59 05:59 05:59


 


Intake Total 1900 1460 400


 


Output Total  1675 600


 


Balance 1900 -215 -200








 











PT  28.8 SEC (12.0-15.0)  H  01/10/18  03:20    


 


INR  2.72  (0.83-1.16)  H  01/10/18  03:20    











 Laboratory Tests











  01/10/18 01/10/18





  03:20 03:20


 


INR  2.72 H 


 


Calcium   8.7


 


AST   26


 


ALT   31


 


Albumin   3.1 L








CXR:  Left lower lobe atelectasis and effusion is about the same as it has been 

over the last several days.





Physical Exam





- Physical Exam


General Appearance: alert, no apparent distress


EENT: PERRL/EOMI, other (Tracheostomy tube in place)


Neck: other


Respiratory: lungs clear (Anteriorly), decreased breath sounds (At both bases), 

rales (Few rales present on the left), No rhonchi (Mild congestion with cough), 

No wheezing


Cardiac/Chest: regular rate, rhythm


Abdomen: normal bowel sounds, non-tender, soft


Skin: normal color, warm/dry


Extremities: No pedal edema


Neuro/Psych: no motor/sensory deficits, No cognition abnormalities





ICD10 Worksheet


Patient Problems: 


 Problems











Problem Status Onset


 


s/p placement LV epicardial lead Acute  ~12/20/17


 


HTN (hypertension) Chronic  


 


Chronic diastolic congestive heart failure Chronic  


 


Coagulopathy Acute  


 


Acute blood loss anemia Acute  


 


S/P Maze operation for atrial fibrillation Acute  ~12/20/17


 


S/P tricuspid valve repair Acute  ~12/20/17


 


S/P mitral valve replacement with bioprosthetic valve Acute  ~12/20/17


 


Chronic anticoagulation Chronic  


 


Paroxysmal atrial fibrillation Chronic  


 


Pulmonary hypertension Chronic  


 


Ascending aorta enlargement Chronic  


 


Atrial enlargement, bilateral Chronic  


 


Secondary tricuspid regurgitation Acute  


 


Severe mitral regurgitation Acute

## 2018-01-11 NOTE — SOAPPROG
SOAP Progress Note


Assessment/Plan: 


Assessment: POD#22 Redo median sternotomy, explant old MV ring, implant #25 

Magna mitral bioprosthesis, TVA #28 Anderson MC3 ring, CoxMaze IV, placement LV 

epicardial lead tunneled to left subclavicular space


POD#16 tracheostomy


POD#4 left thoracentesis, 650 ml


RUE PICC





Severe MR/failed MV rpr - Ring exchanged for tissue MVR. Antithrombotic 

prophylaxis with Coumadin as per existing parameters for PAF.





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 Antithrombotic prophylaxis as per 

MVR.





Paroxysmal atrial fib/flutter, chronically anticoagulated with Coumadin - 

Predominant post CM4 rhythm sinus with PACs and intermittent PAF. Conduction 

intact and TCPWs clipped. AF prophylaxis with amiodarone, BB, and CCB. 

Antithrombotic prophylaxis with coumadin. Lovenox bridge pending INR > 2.





Chronic dCHF - Class II on maximal medical therapy. Off CPB without inotropic 

or pressor support. Significant volume overload gradually diuresed. Moderate 

sized left pleural effusion tapped. 





Ventilator dependent respiratory failure - Trach placed s/p 2 failed trials of 

extubation. Paretic left hemidiaphragm (abnormal pericardial anatomy), 

bronchitis (possible LLL PNA), and resp muscle fatigue contributing factors. 

Course of IV Cefepime completed. Trach downsized. Vent wean with trach collar, 

PMSV and BiPAP per pulm. Swallow passed and on dysphagia diet. Skilled for LTAC 

for recurrent episodes CO2 retention/resp failure.





Acute expected blood loss anemia with coagulopathy - Exacerbated by lovenox and 

CPB. Refractory to fairly significant correction with blood and blood products. 

Ultimately responsive to Factor VII. Thrombocytopenia resolved. Anticoag 

resumed without incident.








Plan:


Cont supportive care as per multidisciplinary team.


Dispo - LTAC (NoCo) pending insurance approval.








01/11/18 06:54











Subjective: 





Hanging in there.


Objective: 





 Vital Signs











Temp Pulse Resp BP Pulse Ox


 


 36.6 C   71   22 H  144/56 H  95 


 


 01/10/18 12:00  01/11/18 04:00  01/11/18 04:00  01/11/18 04:00  01/11/18 04:00








 Laboratory Results





 01/10/18 03:20 





 01/10/18 03:20 





 











 01/10/18 01/11/18 01/12/18





 05:59 05:59 05:59


 


Intake Total 1460 1140 


 


Output Total 1675 1500 


 


Balance -215 -360 








 











PT  25.7 SEC (12.0-15.0)  H  01/11/18  04:10    


 


INR  2.35  (0.83-1.16)  H  01/11/18  04:10    








Trach collar during day. Refused BiPAP last noc.


Holding SR and SBPs > 110.


Balanced I/Os.


INR therapeutic.











Physical Exam





- Physical Exam


General Appearance: alert, no apparent distress


Respiratory: decreased breath sounds (LLL)


Cardiac/Chest: regular rate, rhythm, other (Sternotomy and CT sites CDI)


Abdomen: non-tender, soft


Skin: warm/dry


Extremities: other (no visible edema)





ICD10 Worksheet


Patient Problems: 


 Problems











Problem Status Onset


 


Acute blood loss anemia Acute  


 


Coagulopathy Acute  


 


S/P Maze operation for atrial fibrillation Acute  ~12/20/17


 


S/P mitral valve replacement with bioprosthetic valve Acute  ~12/20/17


 


S/P tricuspid valve repair Acute  ~12/20/17


 


Secondary tricuspid regurgitation Acute  


 


Severe mitral regurgitation Acute  


 


s/p placement LV epicardial lead Acute  ~12/20/17


 


Ascending aorta enlargement Chronic  


 


Atrial enlargement, bilateral Chronic  


 


Chronic anticoagulation Chronic  


 


Chronic diastolic congestive heart failure Chronic  


 


HTN (hypertension) Chronic  


 


Paroxysmal atrial fibrillation Chronic  


 


Pulmonary hypertension Chronic

## 2018-01-12 NOTE — ASMTCMCOM
CM Note

 

CM Note                       

Notes:

Faxed updates to Delmy at [facility] ([phone]) who will use them to work on the 

insurance auth. Delmy called back to say the insurance had denied patient LTAC and wants the 

hospital to keep her until she can go to SNF rehab. Initiated an appeal and gave Delmy Mane's 

number so she can set up a DR. samanta JARAMILLO conference and possibly get the decision overturned. They 

have not called Dr. Mane as of 3:30 this afternoon. CM will continue to follow.

 

Date Signed:  01/12/2018 03:39 PM

Electronically Signed By:Evelyne Castaneda LCSW

## 2018-01-12 NOTE — SOAPPROG
SOAP Progress Note


Assessment/Plan: 


POD #23: Redo median sternotomy, explant old MV ring, implant #25 Magna mitral 

bioprosthesis, TVA #28 Anderson MC3 ring, CoxMaze IV, placement LV epicardial 

lead tunneled to left subclavicular space


POD #17: Open tracheostomy placement 


POD #5: left thoracentesis, 650 cc


POD #1: bronchoscopy 





Severe MR/failed MV rpr - Ring exchanged for tissue MVR. Antithrombotic 

prophylaxis as per CM 4.





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 Antithrombotic prophylaxis as per 

CM 4.





Paroxysmal atrial fib/flutter s/p CM 4 -  continue amiodarone and BB. 

Thromboprophylaxis with Coumadin.  





Class II Chronic dCHF - medically optimzied. Continue Lasix/BB. 





Ventilator dependent respiratory failure - s/p trach placement weaned to TC/

BiPAP with sporadic episodes of hypoxemia and hypercapnia. Bronchscopy 1/11 

with Dr. Mane without significant findings. US of left chest performed today 

without sufficient fluid for drainage. Continue supportive care. 


 


Acute blood loss anemia with resolved thrombocytopenia and coagulopathy - 

stable. 





DVT prophylaxis - Continue Coumadin/SCDs.  





Lines/drains - RUE PICC





Nutrition - tube feeds successfully transitioned to PO





Disposition - plan for LTAC when approved by insurance.








Subjective: 


Denies SOB/pain. Comfortable on TC. Hopeful to be discharged soon. 


Objective: 





 Vital Signs











Temp Pulse Resp BP Pulse Ox


 


 36.6 C   68   16   125/54 H  98 


 


 01/11/18 20:00  01/12/18 04:00  01/12/18 04:00  01/12/18 00:00  01/12/18 04:00








 Microbiology











 01/11/18 15:30  - Final





 Sputum, Induced/Suctioned 








 Laboratory Results





 01/12/18 06:00 





 











 01/11/18 01/12/18 01/13/18





 05:59 05:59 05:59


 


Intake Total 1140 950 


 


Output Total 1500 1525 


 


Balance -360 -575 








 











PT  25.7 SEC (12.0-15.0)  H  01/11/18  04:10    


 


INR  2.35  (0.83-1.16)  H  01/11/18  04:10    














Physical Exam





- Physical Exam


General Appearance: alert, no apparent distress, thin


EENT: No scleral icterus (R), No scleral icterus (L)


Neck: other (trach in place)


Respiratory: No respiratory distress


Cardiac/Chest: regular rate, rhythm


Abdomen: non-tender, soft, No distended


Skin: normal color, warm/dry


Extremities: No pedal edema


Neuro/Psych: no motor/sensory deficits, alert, normal mood/affect, oriented x 3





ICD10 Worksheet


Patient Problems: 


 Problems











Problem Status Onset


 


Acute blood loss anemia Acute  


 


Coagulopathy Acute  


 


S/P Maze operation for atrial fibrillation Acute  ~12/20/17


 


S/P mitral valve replacement with bioprosthetic valve Acute  ~12/20/17


 


S/P tricuspid valve repair Acute  ~12/20/17


 


Secondary tricuspid regurgitation Acute  


 


Severe mitral regurgitation Acute  


 


s/p placement LV epicardial lead Acute  ~12/20/17


 


Ascending aorta enlargement Chronic  


 


Atrial enlargement, bilateral Chronic  


 


Chronic anticoagulation Chronic  


 


Chronic diastolic congestive heart failure Chronic  


 


HTN (hypertension) Chronic  


 


Paroxysmal atrial fibrillation Chronic  


 


Pulmonary hypertension Chronic

## 2018-01-12 NOTE — PDINTPN
Intensivist Progress Note


Assessment/Plan: 


Assessment:





77 F s/p MV redo with Maze procedure complicated by recurrent (known) PAF and 

respiratory failure. 





* Acute Respiratory failure, recurrent, with hypoxia and hypercapnia likely 2/2 

advanced age with diaphragmatic dysfunction and multiple failed extubations. 

Trach placed 12/26 by ENT in OR. Cannot rule out contribution from PNA as well 

initially given elevated WBC. Completed 10 days of Cefepime. Trach downsized to 

6.0 1/7. Tolerating being off the ventilator for periods of time but recurrent 

episodes of respiratory failure occur, probably secondary to respiratory muscle 

fatigue.  No evidence for mucous plugging or a primary cardiac event. CO2 

higher several days ago after being off ventilatory support overnight, 68 with 

a pH of 7.33.  She is supposed to be back on BiPAP at night, but refused this 

last night.  





   Clinically stable at this time, without episodes of respiratory/ventilatory 

failure or the need to suction over the last 24 hr despite being off BiPAP.  


   She thus does seems to be improving.





* Secretions:  Less today.  Status post bronchoscopy yesterday, with only small 

to modest amt of secretions, no mucus plugging.  Cultures negative so far.





* FEN- eating, appetite better.





* Hypernatremia- increased FW 12/29.  Sodium 141, will follow..





* Afib- on amiodarone and metoprolol, oral diltiazem. In NSR...





* s/p MVR redo, TV annuloplasty.  On full-dose anticoagulation. INR 2.8.  

Coumadin decreased per [facility]





* Anemia; Hgb improved, no signs of active bleeding.





* GI prophylaxis- pantoprazole.











Plan:  Continue with trach collar during the day, BiPAP support at night p.r.n.

, and in the day if needed.  Continue bronchopulmonary therapies.  Continue 

supportive care.  Increase ambulation.  In light of episodes of recurrent 

respiratory failure she is unable to go to inpatient rehab at this time.  LTAC 

placement in the works.  She needs to stay in the intensive care unit and 

monitored closely secondary to respiratory events.








25 min of critical care time spent directly with the patient.  Discussed with 

the patient, RT, nursing, and the ICU multi disciplinary team.  

















Subjective: 





Stable overnight off BiPAP.  She feels that her breathing is a little better, 

stronger.  Less cough and less secretions


Objective: 





 Vital Signs











Temp Pulse Resp BP Pulse Ox


 


 36.6 C   68   24 H  92/40 L  99 


 


 01/12/18 12:00  01/12/18 12:00  01/12/18 12:00  01/12/18 12:00  01/12/18 12:00








 Microbiology











 01/11/18 15:30  - Final





 Sputum, Induced/Suctioned 








 Laboratory Results





 01/12/18 06:00 





 01/12/18 06:00 





 











 01/11/18 01/12/18 01/13/18





 05:59 05:59 05:59


 


Intake Total 1140 950 


 


Output Total 1500 1525 


 


Balance -360 -575 








 











PT  29.4 SEC (12.0-15.0)  H  01/12/18  06:00    


 


INR  2.80  (0.83-1.16)  H  01/12/18  06:00    











 Laboratory Tests











  01/11/18 01/12/18 01/12/18





  04:10 06:00 06:00


 


INR  2.35 H  2.80 H 


 


Calcium    9.0








CXR:  Left basilar atelectasis/infiltrate and effusion persists.  Overall, 

about the same over the last 4-5 days.





Ultrasound left chest:  Small amount of fluid present, approximately 100-200 

cc.  Not enough to tap.





Physical Exam





- Physical Exam


General Appearance: alert, no apparent distress, thin


EENT: PERRL/EOMI, other (Tracheostomy tube in place, PM valve attached)


Neck: normal inspection


Respiratory: decreased breath sounds, rales (With the DA changes/consolidation 

at the left base.  No significant dullness), other (Shallow excursions, voice 

weak at times.), No rhonchi, No wheezing


Cardiac/Chest: regular rate, rhythm


Abdomen: normal bowel sounds, non-tender, soft, other (Eating, but intake 

remains relatively poor.)


Skin: normal color, warm/dry


Extremities: No pedal edema


Neuro/Psych: no motor/sensory deficits, No cognition abnormalities





ICD10 Worksheet


Patient Problems: 


 Problems











Problem Status Onset


 


s/p placement LV epicardial lead Acute  ~12/20/17


 


HTN (hypertension) Chronic  


 


Chronic diastolic congestive heart failure Chronic  


 


Coagulopathy Acute  


 


Acute blood loss anemia Acute  


 


S/P Maze operation for atrial fibrillation Acute  ~12/20/17


 


S/P tricuspid valve repair Acute  ~12/20/17


 


S/P mitral valve replacement with bioprosthetic valve Acute  ~12/20/17


 


Chronic anticoagulation Chronic  


 


Paroxysmal atrial fibrillation Chronic  


 


Pulmonary hypertension Chronic  


 


Ascending aorta enlargement Chronic  


 


Atrial enlargement, bilateral Chronic  


 


Secondary tricuspid regurgitation Acute  


 


Severe mitral regurgitation Acute

## 2018-01-12 NOTE — ASMTCMCOM
CM Note

 

CM Note                       

Notes:

Call placed to Delmy at [facility] to inquire about whether the DR to Dr conference had been set up as of 

yet and if they can do weekend admissions. No word on the DR to , in process and they do accept 

weekend admissions, CM to follow.

 

Date Signed:  01/12/2018 04:07 PM

Electronically Signed By:Karen Jose RN

## 2018-01-12 NOTE — GPN
[f rep st]



                                                                 PROCEDURE NOTE





DATE OF PROCEDURE:  01/11/2018



PROCEDURE:  Therapeutic bronchoscopy.



INDICATION:  Persistent secretions and episodes of mucus plugging in a patient status post heart surg
boone with a tracheostomy in place.



DESCRIPTION OF PROCEDURE:  The procedure was performed in the intensive care unit.  Appropriate time-
out was performed.  Informed consent was obtained from the patient.  Full topical anesthesia included
 approximately 15 mL of 1% lidocaine.  Conscious sedation was accomplished with 2 mg of intravenous V
ersed. 



The fiberoptic bronchoscope was passed via bite block orally into the larynx.  The vocal cords were o
bserved and cannulated.  The patient's small bore tracheostomy tube was in good position.  The bronch
oscope was slid past this tube into the distal trachea and in the lower tracheobronchial tree bilater
ally.  There was relative bronchomalacia/collapsibility primarily of the right mainstem bronchus.  Th
e left was more patent.  There were scattered secretions found bilaterally, primarily in the lower lo
bes.  No large mucus plugs were present.  Anatomy otherwise appeared normal.  Secretions were obtaine
d from both sides and combined.  The bronchoscope was removed.  The patient tolerated the procedure w
ell.  Oxygen saturations on supplemental oxygen and vital signs remained normal throughout the proced
ure.



IMPRESSION:  

1.  Some easy collapsibility/bronchomalacia appears to be present on the right side at the level of r
ight mainstem bronchus.  Anatomy was otherwise relatively normal.  

2.  Secretions were present; however, there was not a large amount of secretions and there were no mu
cus plugs found.  Deep cultures were sent for aerobic bacterial analysis.





Job #:  [id number]

## 2018-01-12 NOTE — PDIAF
- Diagnosis


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 


Code Status: Full Code





- Medication Management


Discharge Medications: 


 Medications to Continue on Transfer





Metoprolol Tartrate [Lopressor 100 mg (*)] 100 mg PO BID 05/31/16 [Last Taken 12 /19/17]


Warfarin Sodium [Coumadin 5MG (*)] 5 mg PO SUMOWEFRSA@16 10/18/17 [Last Taken 12 /13/17]


Zolpidem Tartrate [Ambien 5MG (*)] 5 mg PO HS PRN 10/18/17 [Last Taken 12/17/17]


rOPINIRole HCL [Requip 0.25mg (RX)] 0.25 mg PO HS PRN 10/18/17 [Last Taken 12/17 /17]


Furosemide [Lasix 20 MG (*)] 60 mg PO DAILY 12/08/17 [Last Taken 12/18/17]


Potassium Cl [Klor-Con 20 meq (*)] 20 meq PO BID 12/08/17 [Last Taken 12/18/17]


Warfarin Sodium [Coumadin 5MG (*)] 2.5 mg PO TUTH@16 12/08/17 [Last Taken 12/12/ 17]


amLODIPine BESYLATE [Norvasc 5 mg (*)] 5 mg PO DAILY PRN 12/08/17 [Last Taken 12 /17/17]


Enoxaparin [Lovenox 60 MG (*)] 60 mg SQ BID 12/19/17 [Last Taken 12/18/17 09:00]








Discharge Medications: Refer to the Discharge Home Medication list for PRN 

reason.


PICC Care - Routine: Yes (RUE)





- Orders


Services needed: Registered Nurse, Physical Therapy, Occupational Therapy, 

Speech Language Pathologist (PMSV during waking hours and with meals)


Isolation Type: None


Oxygen: titrate to SpO2 > 90%


Diet Recommendation: fluid restriction (use comment for amount) (1800 ml/day)


Diet Texture: Regular Texture Diet, Thin Liquids, Meds Crushed in Puree


Weigh Patient: daily


Boyd: Not applicable


Wound Care Instructions: Ant neck and left gluteal DTI: see progress notes 

wound care nurse.  Sternal incision and chest tube sites: daily soap and water, 

ok to leave open to air, avoid under water immersion until scabs off, avoid 

ointments until scabs off


Activity/Weight Bearing Restrictions: Sternal precautions x 2 more weeks - 

Avoid lifting > 10 lbs with an outstretched arm, avoid push pull activities


Additional: Call [facility] for overnight weight gain > 2 lbs, progressive 

low leg edema, resting HR < 56 or > 120, SBP consistently < 90 or > 150, or any 

surgical wound concerns





- Follow Up Care


Current Providers and Referrals: 


Roseann Bautista MD [Primary Care Provider] -

## 2018-01-13 NOTE — SOAPPROG
SOAP Progress Note


Assessment/Plan: 


Assessment:





POD #24: Redo median sternotomy, explant old MV ring, implant #25 Magna mitral 

bioprosthesis, TVA #28 Anderson MC3 ring, CoxMaze IV, placement LV epicardial 

lead tunneled to left subclavicular space


POD #18: Open tracheostomy placement 


POD #6: Left thoracentesis, 650 cc


POD #2: Bronchoscopy 





Severe MR/failed MV rpr - Ring exchanged for tissue MVR. Antithrombotic 

prophylaxis as per CM 4.  Goal INR 2-3.  INR today 2.64 (2.8).  





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 Antithrombotic prophylaxis as per 

CM 4.





Paroxysmal atrial fib/flutter s/p CM 4 -  Continue Amiodarone and BB. 

Thromboprophylaxis with Coumadin.  





Class II Chronic dCHF - Medically optimzied. Continue Lasix/BB. 





Ventilator dependent respiratory failure - s/p trach placement weaned to TC/

BiPAP with sporadic episodes of hypoxemia and hypercapnia. Bronchscopy 1/11 

with Dr. Mane without significant findings. US of left chest without 

sufficient fluid for drainage.  Continue supportive care.


 


Acute blood loss anemia with resolved thrombocytopenia and coagulopathy - 

stable. 





DVT prophylaxis - Continue Coumadin/SCDs.  





Lines/drains - RUE PICC





Nutrition - Tube feeds were transitioned to PO.  Continue nutrition shakes TID.





Disposition - Being evaluated for LTAC pending peer to peer.   











Plan:


- Hold Coumadin for today.


- Patient reports that she will be more amenable to wearing BiPAP at night. 


- Keep in ICU.











Subjective: 





"I passed out again last night.  I am weaker this week than last week."  

Patient reports good pain control.


Objective: 





 Vital Signs











Temp Pulse Resp BP Pulse Ox


 


 36.6 C   97   19   145/72 H  96 


 


 01/13/18 00:00  01/13/18 06:00  01/13/18 06:35  01/13/18 04:00  01/13/18 06:35








 Microbiology











 01/11/18 15:30  - Final





 Sputum, Induced/Suctioned 








 Laboratory Results





 01/12/18 06:00 





 01/12/18 06:00 





 











 01/12/18 01/13/18 01/14/18





 05:59 05:59 05:59


 


Intake Total 950 1500 


 


Output Total 1525  


 


Balance -575 1500 








 











PT  28.1 SEC (12.0-15.0)  H  01/13/18  04:20    


 


INR  2.64  (0.83-1.16)  H  01/13/18  04:20    














Physical Exam





- Physical Exam


General Appearance: alert, no apparent distress, thin


Respiratory: lungs clear, decreased breath sounds (bases)


Cardiac/Chest: regular rate, rhythm, other (No murmurs or rubs.  Sternum stable

, sternotomy c/d/i.)


Abdomen: normal bowel sounds, non-tender, soft


Skin: warm/dry, other (purple toes bilat from Raynaud's)


Extremities: other (No lower extremity edema)


Neuro/Psych: alert, normal mood/affect, oriented x 3





ICD10 Worksheet


Patient Problems: 


 Problems











Problem Status Onset


 


Acute blood loss anemia Acute  


 


Coagulopathy Acute  


 


S/P Maze operation for atrial fibrillation Acute  ~12/20/17


 


S/P mitral valve replacement with bioprosthetic valve Acute  ~12/20/17


 


S/P tricuspid valve repair Acute  ~12/20/17


 


Secondary tricuspid regurgitation Acute  


 


Severe mitral regurgitation Acute  


 


s/p placement LV epicardial lead Acute  ~12/20/17


 


Ascending aorta enlargement Chronic  


 


Atrial enlargement, bilateral Chronic  


 


Chronic anticoagulation Chronic  


 


Chronic diastolic congestive heart failure Chronic  


 


HTN (hypertension) Chronic  


 


Paroxysmal atrial fibrillation Chronic  


 


Pulmonary hypertension Chronic

## 2018-01-13 NOTE — PDINTPN
Intensivist Progress Note


Assessment/Plan: 


Assessment:





77 F s/p MV redo with Maze procedure complicated by recurrent (known) PAF and 

respiratory failure. 





* Acute Respiratory failure, recurrent, with hypoxia and hypercapnia likely 2/2 

advanced age with diaphragmatic dysfunction and multiple failed extubations. 

Trach placed 12/26 by ENT in OR. Cannot rule out contribution from PNA as well 

initially given elevated WBC. Completed 10 days of Cefepime. Trach downsized to 

6.0 1/7. Tolerating being off BiPAP for periods of time but recurrent episodes 

of respiratory failure occur, perhaps secondary to respiratory muscle fatigue.  

Off for the most part during the day now.  No evidence for mucous plugging or a 

primary cardiac event.  





   Last night she had another episode of respiratory failure/hypoventilation.  

Required bagging and placed back on BiPAP.  PCO2 janneth to 122.





* Secretions:  Fairly minimal now.  Status post repeat bronchoscopy 1/11, with 

only small to modest amt of secretions, no mucus plugging.  Cultures have grown 

E coli again, multiply sensitive, but I feel this is colonization, not 

infection.





* FEN- eating, appetite better.





* Hypernatremia- increased FW 12/29.  Sodium 141, will follow..





* Afib- on amiodarone and metoprolol, oral diltiazem. In NSR...





* s/p MVR redo, TV annuloplasty.  On full-dose anticoagulation. INR 2.6.  

Coumadin at 1 mg per day





* Anemia; Hgb improved, no signs of active bleeding.





* GI prophylaxis- pantoprazole.











Plan:  Mandatory BiPAP support at night, p.r.n. in the day if needed.  I 

recommend that she be off the PM valve for periods time during the day with the 

inner cannula out to decrease airway resistance and thus work of breathing.  

Continue bronchopulmonary therapies.  Continue supportive care.  Increase 

ambulation.  In light of episodes of recurrent respiratory failure she is 

unable to go to inpatient rehab.  LTAC placement still in the works.  She needs 

to stay in the intensive care unit and monitored closely secondary to 

respiratory events.








30 min of critical care time spent directly with the patient.  Discussed with 

the patient, RT, nursing, and the ICU multi disciplinary team.  











Addendum:  Patient was doing relatively well until approximately 2:00 p.m..  

The nurse was in the room and the patient was going to go for a walk.  The PM 

valve was in place.  She stood up to walk, changing positions and then became 

apneic.  Saturations dropped.  She became transient cyanotic, was bagged comma 

put back on BiPAP, and has done relatively well throughout the afternoon.  ABG 

pending after 1 hr on BiPAP.  I am considering changing out her trach to a 

longer 6 cuffed tracheostomy to in the event that this represents malposition 

of her current tracheostomy to and tracheal obstruction?




















01/13/18 15:47





Subjective: 





Tired today, but doing okay.  Had an episode last night of ventilatory failure 

with apparent apnea.  Sleeping at the time.  Noted by nurses.  PCO2 when 

checked was 120.  She does not recall the episode.  She denies mucus or trouble 

with mucus.


Objective: 





 Vital Signs











Temp Pulse Resp BP Pulse Ox


 


 36.6 C   61   19   93/39 L  96 


 


 01/13/18 00:00  01/13/18 12:00  01/13/18 12:00  01/13/18 12:00  01/13/18 12:00








 Microbiology











 01/11/18 15:30  - Final





 Sputum, Induced/Suctioned Sputum Culture - Final





    Escherichia Coli








 Laboratory Results





 01/12/18 06:00 





 01/12/18 06:00 





 











 01/12/18 01/13/18 01/14/18





 05:59 05:59 05:59


 


Intake Total 950 1500 


 


Output Total 1525  


 


Balance -575 1500 








 











PT  28.1 SEC (12.0-15.0)  H  01/13/18  04:20    


 


INR  2.64  (0.83-1.16)  H  01/13/18  04:20    














Physical Exam





- Physical Exam


General Appearance: alert, thin, other (Tired appearing today comma voice 

slightly weaker), No no apparent distress


EENT: PERRL/EOMI, other (Tracheostomy tube in place with PM valve.)


Neck: normal inspection (No JVD)


Respiratory: lungs clear, decreased breath sounds (At bases), rales (At left 

base with some bronchial changes), No rhonchi (Minimal central airway congestion

), No wheezing


Cardiac/Chest: regular rate, rhythm, systolic murmur


Abdomen: normal bowel sounds, non-tender, soft


Skin: normal color, warm/dry


Extremities: No pedal edema


Neuro/Psych: no motor/sensory deficits, No cognition abnormalities





ICD10 Worksheet


Patient Problems: 


 Problems











Problem Status Onset


 


s/p placement LV epicardial lead Acute  ~12/20/17


 


HTN (hypertension) Chronic  


 


Chronic diastolic congestive heart failure Chronic  


 


Coagulopathy Acute  


 


Acute blood loss anemia Acute  


 


S/P Maze operation for atrial fibrillation Acute  ~12/20/17


 


S/P tricuspid valve repair Acute  ~12/20/17


 


S/P mitral valve replacement with bioprosthetic valve Acute  ~12/20/17


 


Chronic anticoagulation Chronic  


 


Paroxysmal atrial fibrillation Chronic  


 


Pulmonary hypertension Chronic  


 


Ascending aorta enlargement Chronic  


 


Atrial enlargement, bilateral Chronic  


 


Secondary tricuspid regurgitation Acute  


 


Severe mitral regurgitation Acute

## 2018-01-14 NOTE — SOAPPROG
SOAP Progress Note


Assessment/Plan: 





POD #25: Redo median sternotomy, explant old MV ring, implant #25 Magna mitral 

bioprosthesis, TVA #28 Anderson MC3 ring, CoxMaze IV, placement LV epicardial 

lead tunneled to left subclavicular space


POD #19: Open tracheostomy placement 


POD #7: Left thoracentesis, 650 cc


POD #3: Bronchoscopy 





Severe MR/failed MV rpr - Ring exchanged for tissue MVR. Antithrombotic 

prophylaxis with Coumadin as per CM 4.  Goal INR 2-3.  INR today 2.16 (2.64).   

Coumadin 1mg today.





Secondary TR - s/p Ring annuloplasty. Antithrombotic prophylaxis as per CM 4.





Paroxysmal atrial fib/flutter s/p CM 4 -  Continue Amiodarone and BB. 

Thromboprophylaxis with Coumadin.  BB decreased today due to low BP. 





Class II Chronic dCHF - Medically optimized. Continue Lasix/BB. 





Ventilator dependent respiratory failure - s/p trach placement weaned to TC/

BiPAP with sporadic episodes of hypoxemia and hypercapnia. Bronchscopy 1/11 

with Dr. Mane without significant findings. US of left chest without 

sufficient fluid for drainage.  Continue supportive care.


 


Acute blood loss anemia with resolved thrombocytopenia and coagulopathy - 

stable. 





DVT prophylaxis - Continue Coumadin/SCDs.  





Lines/drains - RUE PICC.





Nutrition - Tube feeds were transitioned to PO.  Tolerating dysphagia diet.  

Speech to evaluate patient again today.  Encourage Ensure shakes.  





Disposition - Being evaluated for LTAC pending peer to peer.   Ready for 

discharge whenever approved.  Stay in ICU. 








Subjective: 





Patient complains of having a dry mouth whenever she wears BiPAP.  "I am still 

so weak."  Patient reports having no pain.  


Objective: 





 Vital Signs











Temp Pulse Resp BP Pulse Ox


 


 36.6 C   88   24 H  89/53 L  94 


 


 01/13/18 00:00  01/14/18 04:25  01/14/18 04:25  01/14/18 04:00  01/14/18 04:00








 Microbiology











 01/11/18 15:30  - Final





 Sputum, Induced/Suctioned Sputum Culture - Final





    Escherichia Coli








 Laboratory Results





 01/12/18 06:00 





 01/12/18 06:00 





 











 01/13/18 01/14/18 01/15/18





 05:59 05:59 05:59


 


Intake Total 1500 750 


 


Output Total  1000 


 


Balance 1500 -250 








 











PT  28.1 SEC (12.0-15.0)  H  01/13/18  04:20    


 


INR  2.64  (0.83-1.16)  H  01/13/18  04:20    














Physical Exam





- Physical Exam


General Appearance: alert, no apparent distress, thin


Respiratory: lungs clear, decreased breath sounds (bases L>R)


Cardiac/Chest: other (Soft murmur LSB, no rubs.  Sternum stable, sternotomy c/d/

i. )


Abdomen: normal bowel sounds, non-tender, soft


Skin: warm/dry


Extremities: other (No edema)


Neuro/Psych: alert, normal mood/affect, oriented x 3





ICD10 Worksheet


Patient Problems: 


 Problems











Problem Status Onset


 


Acute blood loss anemia Acute  


 


Coagulopathy Acute  


 


S/P Maze operation for atrial fibrillation Acute  ~12/20/17


 


S/P mitral valve replacement with bioprosthetic valve Acute  ~12/20/17


 


S/P tricuspid valve repair Acute  ~12/20/17


 


Secondary tricuspid regurgitation Acute  


 


Severe mitral regurgitation Acute  


 


s/p placement LV epicardial lead Acute  ~12/20/17


 


Ascending aorta enlargement Chronic  


 


Atrial enlargement, bilateral Chronic  


 


Chronic anticoagulation Chronic  


 


Chronic diastolic congestive heart failure Chronic  


 


HTN (hypertension) Chronic  


 


Paroxysmal atrial fibrillation Chronic  


 


Pulmonary hypertension Chronic

## 2018-01-14 NOTE — GPN
[f rep st]



                                                                 PROCEDURE NOTE





DATE OF PROCEDURE:  01/14/2018



PROCEDURE:  Bronchoscopy.



INDICATION:  Reassess airways and airway secretions in a patient now with some mucus plugging and rec
urrent episodes of respiratory failure.



PROCEDURE NOTE:  This procedure followed the changing of the patient's tracheostomy tube to a #7.  Th
e bronchoscope was passed through the tracheostomy tube with the inner cannula out and the balloon do
wn and supplemental oxygen being provided to the face.  She continued to ventilate throughout the pro
cedure. 



There were some secretions in the distal airways, left greater than right.  The right side at the lev
el of the right mainstem remained easily collapsible.  No large mucous plugs were found.  Secretions 
were removed from both sides and combined and sent for culture.





Job #:  [id number]

## 2018-01-14 NOTE — GPN
[f rep st]



                                                                 PROCEDURE NOTE





DATE OF PROCEDURE:  01/14/2018



PROCEDURE:  Tracheostomy change.



INDICATION:  Up size tracheostomy tube, and place a longer tube in a patient with a #6 tracheostomy t
ube in place and with recurrent episodes of respiratory failure, which could or could not be related 
to tube position.



PROCEDURE NOTE:  The procedure was done in the patient's room in the intensive care unit.  Informed c
onsent was obtained from the patient.  1 mg of versed was used for conscious sedation. 



The #6 tracheostomy tube was removed.  A #7 tracheostomy tube that was extra long was placed without 
any difficulty.  The inner cannula was placed and the patient was hooked up to BiPAP and the balloon 
inflated.  Good ventilation of the lungs resulted.



ASSESSMENT:  Successful placement of a #7 cuffed nonfenestrated tracheostomy tube to replace the rena
ent's #6 shorter tube.





Job #:  [id number]

## 2018-01-14 NOTE — PDINTPN
Intensivist Progress Note


Assessment/Plan: 


Assessment:





77 F s/p MV redo with Maze procedure complicated by recurrent (known) PAF and 

respiratory failure. 





* Acute Respiratory failure, recurrent, with hypoxia and hypercapnia likely 2/2 

advanced age with diaphragmatic dysfunction and multiple failed extubations. 

Trach placed 12/26 by ENT in OR. Cannot rule out contribution from PNA as well 

initially given elevated WBC. Completed 10 days of Cefepime. Trach downsized to 

6.0 1/7. Tolerating being off BiPAP for periods of time but recurrent episodes 

of respiratory failure occur, perhaps secondary to respiratory muscle fatigue.  

Off for the most part during the day now.  Over the last few days increased 

mucus now with some mucus plugging seems to be present.  A longer tracheostomy 

tube may be of benefit if there is intermittent mechanical obstruction?  





   With an episode of respiratory failure/hypoventilation 1/13 PCO2 janneth to 122.





   Respiratory muscle weakness is present.  On 01/13 vital capacity is 350 with 

a NIF of -25.





* Secretions:  Increasing today.  Status post repeat bronchoscopy 1/11, with 

only small to modest amt of secretions, no mucus plugging.  Cultures have grown 

E coli again, multiply sensitive, but I feel this is colonization, not 

infection.  For repeat bronchoscopy today with a tracheostomy change.





* FEN- eating, appetite better.





* Hypernatremia- increased FW 12/29.  Sodium last 141, following intermittently.





* Afib- on amiodarone and metoprolol, oral diltiazem. In NSR now.





* s/p MVR redo, TV annuloplasty.  On full-dose anticoagulation. INR 2.16.  

Coumadin at 1 mg per day





* Anemia; Hgb improved, no signs of active bleeding.





* GI prophylaxis- pantoprazole.











Plan:  Mandatory BiPAP support at night, p.r.n. in the day if needed.  For 

repeat bronchoscopy today to reassess mucus and mucus plugs and to re-obtain 

deep cultures.  I will place a longer tracheostomy to at that time, probably a 

size 7.  She currently has a size 6 in place.  I recommend that she be off the 

PM valve for periods time during the day with the inner cannula out to decrease 

airway resistance and thus work of breathing.  Continue bronchopulmonary 

therapies.  Continue supportive care.  Increase ambulation.  In light of 

episodes of recurrent respiratory failure she will need to go to LTAC.  This is 

in the works.  She needs to stay in the intensive care unit and monitored 

closely secondary to respiratory events.








35 min of critical care time spent directly with the patient, not including 

bronchoscopy.  Discussed with the patient and her daughter, RT, nursing, and 

the ICU multi disciplinary team.  








Subjective: 





Doing okay currently.  Did have 1 episode of increased shortness of breath with 

documented mucus plugging this morning.  In better spirits.


Objective: 





 Vital Signs











Temp Pulse Resp BP Pulse Ox


 


 36.6 C   90   21 H  110/50 L  94 


 


 01/14/18 11:56  01/14/18 11:56  01/14/18 11:56  01/14/18 11:56  01/14/18 11:56








 Microbiology











 01/11/18 15:30  - Final





 Sputum, Induced/Suctioned Sputum Culture - Final





    Escherichia Coli








 Laboratory Results





 01/12/18 06:00 





 01/12/18 06:00 





 











 01/13/18 01/14/18 01/15/18





 05:59 05:59 05:59


 


Intake Total 1500 750 


 


Output Total  1000 


 


Balance 1500 -250 








 











PT  24.1 SEC (12.0-15.0)  H  01/14/18  08:15    


 


INR  2.16  (0.83-1.16)  H  01/14/18  08:15    











 Laboratory Tests











  01/13/18





  16:20


 


pCO2  84 H*


 


pO2  95 H


 


Total CO2  45 H*


 


ABG pH  7.33 L


 


O2 Concentration %  50








CXR:  Unchanged.  Density at left base representing atelectasis/infiltrate/

effusion remains present.  Small effusion on right.





Physical Exam





- Physical Exam


General Appearance: alert, other (Up in the chair.  Eating without problems. PM 

valve in place), No no apparent distress


EENT: ET tube (Tracheostomy tube in place)


Neck: other (Trach collar)


Respiratory: decreased breath sounds, rales (At bases, right greater than left.

  Consolidative changes at the left base persist)


Cardiac/Chest: regular rate, rhythm (Can be tachycardic, sinus comma at times.  

No recurrent atrial fibrillation.  Valve sounds fine.  Systolic murmur present.)


Abdomen: normal bowel sounds, non-tender, soft


Skin: normal color, warm/dry


Extremities: No pedal edema


Neuro/Psych: no motor/sensory deficits, No cognition abnormalities (Daughter 

who is newly back from travels [place], is at the patient's bedside.)





ICD10 Worksheet


Patient Problems: 


 Problems











Problem Status Onset


 


s/p placement LV epicardial lead Acute  ~12/20/17


 


HTN (hypertension) Chronic  


 


Chronic diastolic congestive heart failure Chronic  


 


Coagulopathy Acute  


 


Acute blood loss anemia Acute  


 


S/P Maze operation for atrial fibrillation Acute  ~12/20/17


 


S/P tricuspid valve repair Acute  ~12/20/17


 


S/P mitral valve replacement with bioprosthetic valve Acute  ~12/20/17


 


Chronic anticoagulation Chronic  


 


Paroxysmal atrial fibrillation Chronic  


 


Pulmonary hypertension Chronic  


 


Ascending aorta enlargement Chronic  


 


Atrial enlargement, bilateral Chronic  


 


Secondary tricuspid regurgitation Acute  


 


Severe mitral regurgitation Acute

## 2018-01-15 NOTE — ASMTCMCOM
CM Note

 

CM Note                       

Notes:

Intensivist given the phone# [phone] for [employer] Peer to Peer consult for LTAC need. [employer] 


not available today due to [place]. Will try again Tuesday.

 

Date Signed:  01/15/2018 04:08 PM

Electronically Signed By:Poonam Dawkins LCSW

## 2018-01-15 NOTE — SOAPPROG
SOAP Progress Note


Assessment/Plan: 


Assessment: POD#26 Redo median sternotomy, explant old MV ring, implant #25 

Magna mitral bioprosthesis, TVA #28 Anderson MC3 ring, CoxMaze IV, placement LV 

epicardial lead tunneled to left subclavicular space


POD#20 tracheostomy


POD#8 left thoracentesis, 650 ml


RUE PICC





Severe MR/failed MV rpr - Ring exchanged for tissue MVR. Antithrombotic 

prophylaxis with Coumadin as per existing parameters for PAF.





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 Antithrombotic prophylaxis as per 

MVR.





Paroxysmal atrial fib/flutter, chronically anticoagulated with Coumadin - 

Predominant post CM4 rhythm sinus with PACs and intermittent PAF. Conduction 

intact and TCPWs clipped. AF prophylaxis with amiodarone, BB, and CCB. BB dose 

steadily decreased and CCB d/cd due to relative hypotension. Antithrombotic 

prophylaxis with coumadin. Lovenox bridge pending INR > 2 for [id number] 

score of 7.





Acute on chronic dCHF - Class II-III on maximal medical therapy. Off CPB 

without inotropic or pressor support. Significant volume overload gradually 

diuresed. Intermittent exacerbations pulmonary edema related to 

tachyarrhythmias. Moderate sized left pleural effusion tapped. 





Ventilator dependent respiratory failure - Trach placed s/p 2 failed trials of 

extubation. Paretic left hemidiaphragm (abnormal pericardial anatomy), 

bronchitis (possible LLL PNA), resp muscle fatigue, bronchomalacia, and 

possible AARON (hx nocturnal hypoxemia suppl w 2 lpm O2, but neg sleep study) 

contributing factors. Course of IV Cefepime completed. Therapeutic 

bronchoscopies per pulm. Trach downsized and weaned to trach collar, PMSV and 

BiPAP. Swallow passed and on dysphagia diet. Skilled for LTAC for recurrent 

episodes of hypercapnia/hypoxemia. 





Acute expected blood loss anemia with coagulopathy - Exacerbated by preop 

lovenox and CPB. Refractory to fairly significant correction with blood and 

blood products. Ultimately responsive to Factor VII. Thrombocytopenia resolved. 

Anticoag resumed without incident.











Plan:


Cont supportive care as per multidisciplinary team.


Relax fluid restriction to 2,000 ml daily.


Colloid prn SBP < 90.


Stop calcium channel blocker.


Coumadin 2 mg today.


Resume Synthroid.


Dispo - LTAC (NoCo) pending insurance approval.





01/15/18 07:03











Subjective: 





Weak and tired. No acute concerns.


Objective: 





 Vital Signs











Temp Pulse Resp BP Pulse Ox


 


 37.0 C   79   16   93/49 L  98 


 


 01/15/18 04:00  01/15/18 04:00  01/15/18 04:00  01/15/18 04:00  01/15/18 04:00








 Microbiology











 01/14/18 16:45 Gram Stain - Final





 Lung - Bronchial Washings 








 Laboratory Results





 01/15/18 06:05 





 01/15/18 06:05 





 











 01/14/18 01/15/18 01/16/18





 05:59 05:59 05:59


 


Intake Total 750 1100 


 


Output Total 1000 725 


 


Balance -250 375 








 











PT  21.8 SEC (12.0-15.0)  H  01/15/18  06:05    


 


INR  1.89  (0.83-1.16)  H  01/15/18  06:05    








HR and rhythm stable. SBPs marginal and meds decr.


TC during day. BiPAP at night.


Balanced I/Os. Stable renal fx.


INR dropping on 1 mg coumadin.





Physical Exam





- Physical Exam


General Appearance: alert, no apparent distress


Respiratory: decreased breath sounds (left base)


Cardiac/Chest: regular rate, rhythm, other (Sternum grossly stable. Sternotomy 

and CT sites healing well.)


Abdomen: non-tender, soft


Skin: warm/dry


Extremities: other (no visible edema)





ICD10 Worksheet


Patient Problems: 


 Problems











Problem Status Onset


 


Acute blood loss anemia Acute  


 


Coagulopathy Acute  


 


S/P Maze operation for atrial fibrillation Acute  ~12/20/17


 


S/P mitral valve replacement with bioprosthetic valve Acute  ~12/20/17


 


S/P tricuspid valve repair Acute  ~12/20/17


 


Secondary tricuspid regurgitation Acute  


 


Severe mitral regurgitation Acute  


 


s/p placement LV epicardial lead Acute  ~12/20/17


 


Ascending aorta enlargement Chronic  


 


Atrial enlargement, bilateral Chronic  


 


Chronic anticoagulation Chronic  


 


Chronic diastolic congestive heart failure Chronic  


 


HTN (hypertension) Chronic  


 


Paroxysmal atrial fibrillation Chronic  


 


Pulmonary hypertension Chronic

## 2018-01-15 NOTE — PDINTPN
Intensivist Progress Note


Assessment/Plan: 


Assessment/plan:


* Acute Respiratory failure, recurrent, with hypoxia and hypercapnia likely 2/2 

advanced age with diaphragmatic dysfunction and multiple failed extubations. 

Trach placed 12/26 by ENT in OR. Cannot rule out contribution from PNA as well 

initially given elevated WBC. Completed 10 days of Cefepime. Trach downsized to 

6.0 1/7. Tolerating being off BiPAP for periods of time but recurrent episodes 

of respiratory failure occur, perhaps secondary to respiratory muscle fatigue.  

Off for the most part during the day now.  Over the last few days increased 

mucus now with some mucus plugging seems to be present.  


   With an episode of respiratory failure/hypoventilation 1/13 PCO2 janneth to 122.


   Respiratory muscle weakness is present.  On 01/13 vital capacity is 350 with 

a NIF of -25.





* Status post tracheostomy-downsized to a 6.0.  Subsequently up sized to a 7.0.





* History of paralyzed diaphragm





* Secretions:  Improved.  Status post repeat bronchoscopy 1/11, with only small 

to modest amt of secretions, no mucus plugging.  Cultures have grown E coli 

again, multiply sensitive, but I feel this is likely colonization, not 

infection.  Chest x-ray mostly clear





* FEN- eating, appetite better.





* Hypernatremia- increased FW 12/29.  Sodium last 141, following intermittently.





* Afib- on amiodarone and metoprolol, oral diltiazem. In NSR now.





* s/p MVR redo, TV annuloplasty.  On full-dose anticoagulation. INR 2.16.  

Coumadin at 1 mg per day





* Anemia; Hgb improved, no signs of active bleeding.





* GI prophylaxis- pantoprazole.





* PT/OT





* Nutrition-appears adequate.





0


Subjective: 





Sitting up in chair.  Comfortable.  Using Passy Thendara valve adequately.


Objective: 





 Vital Signs











Temp Pulse Resp BP Pulse Ox


 


 37.1 C   89   26 H  97/49 L  86 L


 


 01/15/18 07:56  01/15/18 08:34  01/15/18 07:56  01/15/18 08:34  01/15/18 07:56








 Microbiology











 01/14/18 16:45 Gram Stain - Final





 Lung - Bronchial Washings 








 Laboratory Results





 01/15/18 06:05 





 01/15/18 06:05 





 











 01/14/18 01/15/18 01/16/18





 05:59 05:59 05:59


 


Intake Total 750 1100 


 


Output Total 1000 725 


 


Balance -250 375 








 











PT  21.8 SEC (12.0-15.0)  H  01/15/18  06:05    


 


INR  1.89  (0.83-1.16)  H  01/15/18  06:05    








Chest x-ray-reviewed by myself tracheostomy tube present small left-sided 

pleural effusion present





- Time Spent With Patient


Time Spent With Patient: 





The 25 min of time spent with patient, more than 1/2 in counseling coordination 

of care





Physical Exam





- Physical Exam


General Appearance: alert, no apparent distress


EENT: PERRL/EOMI, normal ENT inspection


Neck: non-tender, other (Trach site clean)


Respiratory: No respiratory distress, No rhonchi, No wheezing


Cardiac/Chest: normal peripheral pulses, regular rate, rhythm


Abdomen: normal bowel sounds, non-tender, soft


Pelvic Exam: deferred


Rectal: deferred


Skin: normal color, warm/dry


Extremities: non-tender, normal inspection





ICD10 Worksheet


Patient Problems: 


 Problems











Problem Status Onset


 


Acute blood loss anemia Acute  


 


Coagulopathy Acute  


 


S/P Maze operation for atrial fibrillation Acute  ~12/20/17


 


S/P mitral valve replacement with bioprosthetic valve Acute  ~12/20/17


 


S/P tricuspid valve repair Acute  ~12/20/17


 


Secondary tricuspid regurgitation Acute  


 


Severe mitral regurgitation Acute  


 


s/p placement LV epicardial lead Acute  ~12/20/17


 


Ascending aorta enlargement Chronic  


 


Atrial enlargement, bilateral Chronic  


 


Chronic anticoagulation Chronic  


 


Chronic diastolic congestive heart failure Chronic  


 


HTN (hypertension) Chronic  


 


Paroxysmal atrial fibrillation Chronic  


 


Pulmonary hypertension Chronic

## 2018-01-16 NOTE — PDINTPN
Intensivist Progress Note


Assessment/Plan: 


Assessment/plan:


* Acute Respiratory failure, recurrent, with hypoxia and hypercapnia likely 2/2 

advanced age with diaphragmatic dysfunction and multiple failed extubations. 

Trach placed 12/26 by ENT in OR. Cannot rule out contribution from PNA as well 

initially given elevated WBC. Completed 10 days of Cefepime. Trach downsized to 

6.0 1/7. Tolerating being off BiPAP for periods of time but recurrent episodes 

of respiratory failure occur, perhaps secondary to respiratory muscle fatigue.  

Off for the most part during the day now. 


   Respiratory muscle weakness is present.  On 01/13 vital capacity is 350 with 

a NIF of -25.


      -will start scheduled suctioning





* Status post tracheostomy-downsized to a 6.0.  Subsequently up sized to a 7.0.





* History of paralyzed diaphragm





* Secretions:  Improved.  





* FEN- eating, appetite better.





* Hypernatremia- increased FW 12/29.  Sodium last 141, following intermittently.





* Afib- on amiodarone and metoprolol, oral diltiazem. In NSR now.





* s/p MVR redo, TV annuloplasty.  On full-dose anticoagulation. Coumadin at 1 

mg per day





* Anemia; stable





* GI prophylaxis- pantoprazole.





* PT/OT





* Nutrition-appears adequate.





* Disposition-I had a long discussion with the medical director of [facility].  He states emphatically the patient does not meet criteria for long-

term acute care hospital.  To this regard, we will keep her here for now.





Subjective: 





Sitting up in chair.  Comfortable.  Had episode of hypoxia requiring suctioning 

today.


Objective: 





 Vital Signs











Temp Pulse Resp BP Pulse Ox


 


 36.6 C   78   24 H  117/56 L  100 


 


 01/16/18 08:00  01/16/18 08:00  01/16/18 08:00  01/16/18 08:00  01/16/18 08:00








 Microbiology











 01/14/18 16:45 Gram Stain - Final





 Lung - Bronchial Washings 








 Laboratory Results





 01/15/18 06:05 





 01/16/18 04:34 





 











 01/15/18 01/16/18 01/17/18





 05:59 05:59 05:59


 


Intake Total 1100 750 


 


Output Total 725 575 


 


Balance 375 175 








 











PT  21.6 SEC (12.0-15.0)  H  01/16/18  04:34    


 


INR  1.87  (0.83-1.16)  H  01/16/18  04:34    














- Time Spent With Patient


Time Spent With Patient: 





35 min of time spent with patient, over half involved with counseling or 

coordination of care.


Case discussed with respiratory therapy, nursing and surgery





Physical Exam





- Physical Exam


General Appearance: alert, no apparent distress


EENT: PERRL/EOMI


Neck: non-tender, full range of motion


Respiratory: rhonchi (Few), No respiratory distress, No accessory muscle use


Cardiac/Chest: normal peripheral pulses, regular rate, rhythm, systolic murmur


Abdomen: normal bowel sounds, non-tender, soft


Pelvic Exam: deferred


Rectal: deferred


Skin: normal color, warm/dry


Extremities: normal range of motion, non-tender, normal inspection, normal 

capillary refill


Neuro/Psych: alert, normal mood/affect, oriented x 3





ICD10 Worksheet


Patient Problems: 


 Problems











Problem Status Onset


 


Acute blood loss anemia Acute  


 


Coagulopathy Acute  


 


S/P Maze operation for atrial fibrillation Acute  ~12/20/17


 


S/P mitral valve replacement with bioprosthetic valve Acute  ~12/20/17


 


S/P tricuspid valve repair Acute  ~12/20/17


 


Secondary tricuspid regurgitation Acute  


 


Severe mitral regurgitation Acute  


 


s/p placement LV epicardial lead Acute  ~12/20/17


 


Ascending aorta enlargement Chronic  


 


Atrial enlargement, bilateral Chronic  


 


Chronic anticoagulation Chronic  


 


Chronic diastolic congestive heart failure Chronic  


 


HTN (hypertension) Chronic  


 


Paroxysmal atrial fibrillation Chronic  


 


Pulmonary hypertension Chronic

## 2018-01-16 NOTE — WOCRNPDOC
WOCRN Advanced Assessment Note





- Skin Integrity Problem, Advanced Assess





  ** Left Medial Gluteal Cleft Pressure Injury


Dressing Type: Open to Air (visible Calazime)


Exudate Amount: None


Exudate Characteristic(s): None


Integumentary Issue Intervention: Barrier Cream Applied (Calazime)


Josette Wound Tissue: Blanching, Erythema


Josette Wound Swelling: None


Wound Bed Color: Red


Wound Bed Constitution: Red/Pink - Non Granular Tissue (partial-thickness)


Site Measurement - Head-to-Toe Length X Width X Depth (cm): 1cmx0.7cmx0.1cm


Pressure Injury Stage: Stage 2


Pressure Injury Present on Admit: No (see previous WC note)


Skin Integrity Problem Comment: Consulted by Staff RN Karina to reassess 

pressure injury to the left of patient's coccyx. Wound dimensions are larger 

than previous assessment, but wound remains partial-thickness. Josette-wound skin 

is blanching and intact. Will continue w/ current tx of Calazime. Talked with 

patient and Karina about removing the briefs she is currently wearing to reduce 

heat and moisture to area. Also discussed shifting weight from side to side 

while up in the chair to relieve pressure over the coccyx. Patient says she 

will try to stay off of the area. I will see her again Friday 1/19 to evaluate.

## 2018-01-16 NOTE — ASMTCMCOM
CM Note

 

CM Note                       

Notes:

Dr. Lee contacted Dr. Yomi Figueroa [employer] [phone] today to appeal their decision to 

deny LTAC. Dr. Figueroa reports that patient does not meet their criteria for LTAC and will need to 

stay in our acute care until she is ready for SNF level of care.

 

Date Signed:  01/16/2018 04:01 PM

Electronically Signed By:Poonam Dawkins LCSW

## 2018-01-17 NOTE — SOAPPROG
SOAP Progress Note


Assessment/Plan: 


POD #27: Redo median sternotomy, explant old MV ring, implant #25 Magna mitral 

bioprosthesis, TVA #28 Anderson MC3 ring, CoxMaze IV, placement LV epicardial 

lead tunneled to left subclavicular space


POD #21: Open tracheostomy placement 


POD #9: left thoracentesis, 650 cc





Severe MR/failed MV rpr - Ring exchanged for tissue MVR. Antithrombotic 

prophylaxis as per CM 4.





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 Antithrombotic prophylaxis as per 

CM 4.





Paroxysmal atrial fib/flutter s/p CM 4 -  continue amiodarone and BB. 

Thromboprophylaxis with Coumadin.  





Class II Chronic dCHF - medically optimized. Continue beta-blocker and Lasix 

prn. 





Ventilator dependent respiratory failure - s/p trach placement weaned to TC/

BiPAP with sporadic episodes of hypoxemia and hypercapnia. Bronchoscopy 1/11 

without significant findings. Continue supportive care. 


 


Acute blood loss anemia with resolved thrombocytopenia and coagulopathy - 

stable. 





DVT prophylaxis - Continue Coumadin/SCDs.  





Lines/drains - RUE PICC





Nutrition - tube feeds successfully transitioned to PO





Disposition - LTAC refused patient. Continue rehabilitation at [facility] until stable 

for SNF vs home.  





Subjective: 


Feels great today. 


Objective: 





 Vital Signs











Temp Pulse Resp BP Pulse Ox


 


 36.4 C   73   17   101/48 L  100 


 


 01/17/18 07:33  01/17/18 07:33  01/17/18 07:33  01/17/18 07:33  01/17/18 07:33








 Microbiology











 01/14/18 16:45 Gram Stain - Final





 Lung - Bronchial Washings Bronchial Washings Culture - Final








 Laboratory Results





 01/15/18 06:05 





 01/16/18 04:34 





 











 01/16/18 01/17/18 01/18/18





 05:59 05:59 05:59


 


Intake Total 750 1230 


 


Output Total 575 900 300


 


Balance 175 330 -300








 











PT  21.6 SEC (12.0-15.0)  H  01/16/18  04:34    


 


INR  1.87  (0.83-1.16)  H  01/16/18  04:34    














Physical Exam





- Physical Exam


General Appearance: alert, no apparent distress, thin


EENT: No scleral icterus (R), No scleral icterus (L)


Neck: other (trach in place)


Respiratory: lungs clear, No respiratory distress


Cardiac/Chest: regular rate, rhythm


Abdomen: non-tender, soft, No distended


Skin: normal color, warm/dry


Extremities: No pedal edema


Neuro/Psych: no motor/sensory deficits, alert, normal mood/affect, oriented x 3





ICD10 Worksheet


Patient Problems: 


 Problems











Problem Status Onset


 


Acute blood loss anemia Acute  


 


Coagulopathy Acute  


 


S/P Maze operation for atrial fibrillation Acute  ~12/20/17


 


S/P mitral valve replacement with bioprosthetic valve Acute  ~12/20/17


 


S/P tricuspid valve repair Acute  ~12/20/17


 


Secondary tricuspid regurgitation Acute  


 


Severe mitral regurgitation Acute  


 


s/p placement LV epicardial lead Acute  ~12/20/17


 


Ascending aorta enlargement Chronic  


 


Atrial enlargement, bilateral Chronic  


 


Chronic anticoagulation Chronic  


 


Chronic diastolic congestive heart failure Chronic  


 


HTN (hypertension) Chronic  


 


Paroxysmal atrial fibrillation Chronic  


 


Pulmonary hypertension Chronic

## 2018-01-17 NOTE — PDINTPN
Intensivist Progress Note


Assessment/Plan: 


Assessment/plan:


* Acute Respiratory failure, recurrent, with hypoxia and hypercapnia likely 2/2 

advanced age with diaphragmatic dysfunction and multiple failed extubations. 

Trach placed 12/26 by ENT in OR. Cannot rule out contribution from PNA as well 

initially given elevated WBC. Completed 10 days of Cefepime. Trach downsized to 

6.0 1/7. Tolerating being off BiPAP for periods of time but recurrent episodes 

of respiratory failure occur, perhaps secondary to respiratory muscle fatigue.  

Off for the most part during the day now. Respiratory muscle weakness is 

present.  No hypoxemic episodes in the last 24 hr


      -will continue scheduled suctioning





* Status post tracheostomy-downsized to a 6.0.  Subsequently up sized to a 7.0.





* History of paralyzed diaphragm





* Secretions:  Improved.  





* FEN- eating, appetite better.





* Hypernatremia- increased FW 12/29.  Sodium last 141, following intermittently.





* Afib- on amiodarone and metoprolol, oral diltiazem. In NSR now.





* s/p MVR redo, TV annuloplasty.  On full-dose anticoagulation. Coumadin at 1 

mg per day





* Anemia; stable





* GI prophylaxis- pantoprazole.





* PT/OT-increase aggressive physical





* Nutrition-appears adequate.





* Disposition-I had a long discussion with the medical director of [facility].  He states emphatically the patient does not meet criteria for long-

term acute care hospital.  To this regard, we will keep her here for now.





Subjective: 





Up in chair.  Comfortable.  Voice is much stronger today.


Objective: 





 Vital Signs











Temp Pulse Resp BP Pulse Ox


 


 36.4 C   73   17   101/48 L  100 


 


 01/17/18 07:33  01/17/18 07:33  01/17/18 07:33  01/17/18 07:33  01/17/18 07:33








 Microbiology











 01/14/18 16:45 Gram Stain - Final





 Lung - Bronchial Washings Bronchial Washings Culture - Final








 Laboratory Results





 01/15/18 06:05 





 01/16/18 04:34 





 











 01/16/18 01/17/18 01/18/18





 05:59 05:59 05:59


 


Intake Total 750 1230 


 


Output Total 575 900 300


 


Balance 175 330 -300








 











PT  21.6 SEC (12.0-15.0)  H  01/16/18  04:34    


 


INR  1.87  (0.83-1.16)  H  01/16/18  04:34    














- Time Spent With Patient


Time Spent With Patient: 





35 min of time spent with patient, over 1/2 involved with coordination of care 

or counseling





Physical Exam





- Physical Exam


General Appearance: alert, no apparent distress


EENT: PERRL/EOMI, normal ENT inspection


Neck: non-tender, other (Trach site clean and dry)


Respiratory: crackles (Few basilar), No respiratory distress, No accessory 

muscle use


Cardiac/Chest: normal peripheral pulses, regular rate, rhythm, systolic murmur


Peripheral Pulses: 2+: carotid (R), carotid (L), femoral (R), femoral (L), 

dorsalis-pedis (R), dorsalis-pedis (L)


Abdomen: normal bowel sounds, non-tender, soft


Pelvic Exam: deferred


Rectal: deferred


Skin: normal color, warm/dry


Extremities: normal range of motion, non-tender, normal inspection, normal 

capillary refill


Neuro/Psych: no motor/sensory deficits, alert, normal mood/affect, oriented x 3





ICD10 Worksheet


Patient Problems: 


 Problems











Problem Status Onset


 


Acute blood loss anemia Acute  


 


Coagulopathy Acute  


 


S/P Maze operation for atrial fibrillation Acute  ~12/20/17


 


S/P mitral valve replacement with bioprosthetic valve Acute  ~12/20/17


 


S/P tricuspid valve repair Acute  ~12/20/17


 


Secondary tricuspid regurgitation Acute  


 


Severe mitral regurgitation Acute  


 


s/p placement LV epicardial lead Acute  ~12/20/17


 


Ascending aorta enlargement Chronic  


 


Atrial enlargement, bilateral Chronic  


 


Chronic anticoagulation Chronic  


 


Chronic diastolic congestive heart failure Chronic  


 


HTN (hypertension) Chronic  


 


Paroxysmal atrial fibrillation Chronic  


 


Pulmonary hypertension Chronic

## 2018-01-18 NOTE — SOAPPROG
SOAP Progress Note


Assessment/Plan: 


POD #29: Redo median sternotomy, explant old MV ring, implant #25 Magna mitral 

bioprosthesis, TVA #28 Anderson MC3 ring, CoxMaze IV, placement LV epicardial 

lead tunneled to left subclavicular space


POD #23: Open tracheostomy placement 





Severe MR/failed MV rpr - Ring exchanged for tissue MVR. Antithrombotic 

prophylaxis as per CM 4.





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 Antithrombotic prophylaxis as per 

CM 4.





Paroxysmal atrial fib/flutter s/p CM 4 -  continue amiodarone and BB. 

Thromboprophylaxis with Coumadin.  





Class II Chronic dCHF - medically optimized. Continue beta-blocker and Lasix 

prn. 





Ventilator dependent respiratory failure - s/p trach placement weaned to TC/

BiPAP with sporadic episodes of hypoxemia and hypercapnia. Bronchoscopy 1/11 

without significant findings. Continue supportive care. 


 


Acute blood loss anemia with resolved thrombocytopenia and coagulopathy - 

stable. 





DVT prophylaxis - Continue Coumadin/SCDs.  





Lines/drains - RUE PICC





Nutrition - PO





Disposition - LTAC refused patient. Continue rehabilitation at [facility] until stable 

for SNF vs home.  








Subjective: 


No complaints.


Objective: 





 Vital Signs











Temp Pulse Resp BP Pulse Ox


 


 36.4 C   66   14   102/53 L  100 


 


 01/18/18 03:57  01/18/18 03:57  01/18/18 03:57  01/18/18 03:57  01/18/18 03:57








 Laboratory Results





 01/15/18 06:05 





 01/16/18 04:34 





 











 01/17/18 01/18/18 01/19/18





 05:59 05:59 05:59


 


Intake Total 1230 1550 


 


Output Total 900 1000 


 


Balance 330 550 








 











PT  22.3 SEC (12.0-15.0)  H  01/18/18  05:20    


 


INR  1.95  (0.83-1.16)  H  01/18/18  05:20    














Physical Exam





- Physical Exam


General Appearance: alert, no apparent distress


EENT: No scleral icterus (R), No scleral icterus (L)


Neck: normal inspection


Respiratory: No respiratory distress


Cardiac/Chest: regular rate, rhythm


Abdomen: non-tender, soft, No distended


Skin: normal color, warm/dry


Extremities: No pedal edema


Neuro/Psych: no motor/sensory deficits, alert, normal mood/affect, oriented x 3





ICD10 Worksheet


Patient Problems: 


 Problems











Problem Status Onset


 


Acute blood loss anemia Acute  


 


Coagulopathy Acute  


 


S/P Maze operation for atrial fibrillation Acute  ~12/20/17


 


S/P mitral valve replacement with bioprosthetic valve Acute  ~12/20/17


 


S/P tricuspid valve repair Acute  ~12/20/17


 


Secondary tricuspid regurgitation Acute  


 


Severe mitral regurgitation Acute  


 


s/p placement LV epicardial lead Acute  ~12/20/17


 


Ascending aorta enlargement Chronic  


 


Atrial enlargement, bilateral Chronic  


 


Chronic anticoagulation Chronic  


 


Chronic diastolic congestive heart failure Chronic  


 


HTN (hypertension) Chronic  


 


Paroxysmal atrial fibrillation Chronic  


 


Pulmonary hypertension Chronic

## 2018-01-19 NOTE — ASMTCMCOM
CM Note

 

CM Note                       

Notes:

Left a message for patient's daughter Sarah to discuss options in light of the recent denials for 

rehab care for patient from [place]. CM continues to follow.

 

Date Signed:  01/19/2018 03:46 PM

Electronically Signed By:Evelyne Castaneda LCSW

## 2018-01-19 NOTE — WOCRNPDOC
[facility] Advanced Assessment Note





- Skin Integrity Problem, Advanced Assess





  ** Anterior Neck


Dressing Type: Polymem


Exudate Amount: Scant


Exudate Color: Reddish/Yellow


Exudate Characteristic(s): Serosanguinous


Integumentary Issue Intervention: Visualized Under Dressing


Josette Wound Tissue: Blanching, Erythema, Denuded


Josette Wound Swelling: Mild


Wound Bed Color: Red


Wound Bed Constitution: Red/Pink - Non Granular Tissue


Site Odor: None


Site Measurement - Head-to-Toe Length X Width X Depth (cm): 0.5nqs4yzq2.2cm


Pressure Injury Stage: Stage 3, Medical Device Related Pressure Injury (Trach)


Pressure Injury Present on Admit: No


Skin Integrity Problem Comment: Previously unstageable, slough-filled wound now 

comprised of smooth, non-granulating tissue, appearance consistent w/ stage 3 

pressure injury. Josette-wound skin is intact and blanching. Difficult to heal 

while trach still in place. Polymem dressing covering site to help displace 

pressure is moist from humidified 02. Will change dressing order to more 

absorbent Drawtex trach dressing, and follow up on Sunday 1/21 to assess. 

Report given to Staff HANANE Ariza.





  ** Left Medial Gluteal Cleft Pressure Injury


Dressing Type: Open to Air (Calazime)


Exudate Amount: None


Exudate Characteristic(s): None


Integumentary Issue Intervention: Dressing Applied, Hydrogel Applied


Josette Wound Tissue: Blanching, Intact


Josette Wound Swelling: None


Wound Bed Color: Red


Wound Bed Constitution: Red/Pink - Non Granular Tissue


Site Measurement - Head-to-Toe Length X Width X Depth (cm): 2.2cmx0.5cmx0.1cm


Pressure Injury Stage: Stage 2


Pressure Injury Present on Admit: No (Hospitalist notified on 1/9)


Skin Integrity Problem Comment: Partial-thickness wound to the left of patient'

s coccyx, appearance remains consistent w/ stage 2. Josette-wound skin intact and 

blanching. Previous tx w/ Calazime to protect wound and surrounding skin from 

moisture and occasional residual stool. Patient reports sitting up in her chair 

throughout the day. Despite the use of the pressure-relieving cushion, being 

upright in the chair for prolonged periods is compromising healing of this 

wound. In attempt to balance her desire to be up in the chair with wound healing

, I placed a rolled blanket under her L buttock to help shift her to her R side 

and off her coccyx. Spoke with Staff HANANE Ariza about alternating this method, 

side to side, every hour to help keep pressure off the wound. Wound cleansed w/ 

NS and gauze, Hydrogel applied to wound bed, and site covered w/ medium Allevyn 

Life dressing. Will re-check on Sunday 1/21 to assess efficacy of the dressing 

and off-loading.





  ** Left Elbow


Dressing Type: Open to Air


Exudate Amount: None


Exudate Characteristic(s): None


Integumentary Issue Intervention: Dressing Applied, Hydrogel Applied


Josette Wound Tissue: Blanching, Intact, Thin


Josette Wound Swelling: None


Wound Bed Color: Red


Wound Bed Constitution: Red/Pink - Non Granular Tissue


Site Odor: None


Site Measurement - Head-to-Toe Length X Width X Depth (cm): 0.4cmx0.4cmx0.1cm


Skin Integrity Problem Comment: Small, discrete wound above patient's L elbow, 

which she reports is r/t pushing herself up in bed. Josette-wound skin is intact 

and blanching. Site covered w/ hydrogel and Allevyn. No need for wound care to 

follow this wound ongoing. Order for protective dressing.

## 2018-01-19 NOTE — SOAPPROG
SOAP Progress Note


Assessment/Plan: 


POD #30: Redo median sternotomy, explant old MV ring, implant #25 Magna mitral 

bioprosthesis, TVA #28 Anderson MC3 ring, CoxMaze IV, placement LV epicardial 

lead tunneled to left subclavicular space


POD #24: Open tracheostomy placement 





Severe MR/failed MV rpr - Ring exchanged for tissue MVR. Antithrombotic 

prophylaxis as per CM 4.





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 Antithrombotic prophylaxis as per 

CM 4.





Paroxysmal atrial fib/flutter s/p CM 4 -  continue amiodarone and BB. 

Thromboprophylaxis with Coumadin.  





Class II Chronic dCHF - medically optimized. Continue beta-blocker and Lasix 

prn. 





Ventilator dependent respiratory failure - s/p trach placement weaned to TC/

BiPAP with sporadic episodes of hypoxemia and hypercapnia. Bronchoscopy 1/11 

without significant findings. Continue supportive care. 


 


Acute blood loss anemia with resolved thrombocytopenia and coagulopathy - 

stable. 





DVT prophylaxis - Continue Coumadin/SCDs.  





Lines/drains - RUE PICC





Nutrition - PO





Disposition - LTAC refused patient. Continue rehabilitation at [facility] until stable 

for SNF vs home.  











Subjective: 


Comfortable.


Objective: 





 Vital Signs











Temp Pulse Resp BP Pulse Ox


 


 36.7 C   70   14   106/51 L  95 


 


 01/19/18 04:00  01/19/18 05:15  01/19/18 05:15  01/19/18 04:00  01/19/18 05:15








 Laboratory Results





 01/15/18 06:05 





 01/16/18 04:34 





 











 01/18/18 01/19/18 01/20/18





 05:59 05:59 05:59


 


Intake Total 1550 1650 


 


Output Total 1000 1200 400


 


Balance 550 450 -400








 











PT  22.3 SEC (12.0-15.0)  H  01/18/18  05:20    


 


INR  1.95  (0.83-1.16)  H  01/18/18  05:20    














Physical Exam





- Physical Exam


General Appearance: WD/WN, alert, no apparent distress


EENT: No scleral icterus (R), No scleral icterus (L)


Neck: other (trach in place)


Respiratory: No respiratory distress


Cardiac/Chest: regular rate, rhythm


Abdomen: non-tender, soft, No distended


Skin: normal color, warm/dry


Extremities: No pedal edema


Neuro/Psych: no motor/sensory deficits, alert, normal mood/affect, oriented x 3





ICD10 Worksheet


Patient Problems: 


 Problems











Problem Status Onset


 


Acute blood loss anemia Acute  


 


Coagulopathy Acute  


 


S/P Maze operation for atrial fibrillation Acute  ~12/20/17


 


S/P mitral valve replacement with bioprosthetic valve Acute  ~12/20/17


 


S/P tricuspid valve repair Acute  ~12/20/17


 


Secondary tricuspid regurgitation Acute  


 


Severe mitral regurgitation Acute  


 


s/p placement LV epicardial lead Acute  ~12/20/17


 


Ascending aorta enlargement Chronic  


 


Atrial enlargement, bilateral Chronic  


 


Chronic anticoagulation Chronic  


 


Chronic diastolic congestive heart failure Chronic  


 


HTN (hypertension) Chronic  


 


Paroxysmal atrial fibrillation Chronic  


 


Pulmonary hypertension Chronic

## 2018-01-19 NOTE — PDINTPN
Intensivist Progress Note


Assessment/Plan: 


Assessment/plan:


* Acute Respiratory failure, recurrent, with hypoxia and hypercapnia likely 2/2 

advanced age with diaphragmatic dysfunction and multiple failed extubations.  

She has gone 24 hr without any hypoxemic events requiring suctioning.


      -continue BiPAP at night and with naps during daytime.  





* Status post tracheostomy-downsized to a 6.0.  Subsequently up sized to a 7.0.


      -have begun capping trials will extend this as long as possible during 

daytime today.





* History of paralyzed diaphragm





* Secretions:  Improved.  





* FEN- eating, appetite better.





* Hypernatremia- increased FW 12/29.  Sodium last 141, following intermittently.





* Afib- on amiodarone and metoprolol, oral diltiazem. In NSR now.





* s/p MVR redo, TV annuloplasty.  On full-dose anticoagulation. Coumadin at 1 

mg per day





* Anemia; stable





* GI prophylaxis- pantoprazole.





* PT/OT-increase aggressive physical





* Nutrition-appears adequate.





* Disposition-I had a long discussion with the medical director of [facility].  He states emphatically the patient does not meet criteria for long-

term acute care hospital.  To this regard, we will keep her here for now.





01/19/18 09:39





Subjective: 





Sitting up eating breakfast.  Comfortable.  Slept well last night.  No problems 

with breathing or hypoxemic events last night.


Objective: 





 Vital Signs











Temp Pulse Resp BP Pulse Ox


 


 36.8 C   88   29 H  131/59 H  100 


 


 01/19/18 08:00  01/19/18 08:00  01/19/18 08:00  01/19/18 08:00  01/19/18 08:00








 Laboratory Results





 01/15/18 06:05 





 01/16/18 04:34 





 











 01/18/18 01/19/18 01/20/18





 05:59 05:59 05:59


 


Intake Total 1550 1650 


 


Output Total 1000 1200 400


 


Balance 550 450 -400








 











PT  22.3 SEC (12.0-15.0)  H  01/18/18  05:20    


 


INR  1.95  (0.83-1.16)  H  01/18/18  05:20    














- Time Spent With Patient


Time Spent With Patient: 





35 min of time spent with patient over half involved with coordination of care 

or counseling





Physical Exam





- Physical Exam


General Appearance: alert, no apparent distress


EENT: PERRL/EOMI


Neck: non-tender, full range of motion, supple, normal inspection, other (Trach 

site clean and dry)


Respiratory: chest non-tender, lungs clear, normal breath sounds


Cardiac/Chest: normal peripheral pulses, regular rate, rhythm


Abdomen: normal bowel sounds, non-tender, soft


Pelvic Exam: deferred


Rectal: deferred


Skin: normal color, warm/dry


Extremities: normal range of motion, non-tender, normal inspection, normal 

capillary refill


Neuro/Psych: no motor/sensory deficits, alert, normal mood/affect, oriented x 3





ICD10 Worksheet


Patient Problems: 


 Problems











Problem Status Onset


 


Acute blood loss anemia Acute  


 


Coagulopathy Acute  


 


S/P Maze operation for atrial fibrillation Acute  ~12/20/17


 


S/P mitral valve replacement with bioprosthetic valve Acute  ~12/20/17


 


S/P tricuspid valve repair Acute  ~12/20/17


 


Secondary tricuspid regurgitation Acute  


 


Severe mitral regurgitation Acute  


 


s/p placement LV epicardial lead Acute  ~12/20/17


 


Ascending aorta enlargement Chronic  


 


Atrial enlargement, bilateral Chronic  


 


Chronic anticoagulation Chronic  


 


Chronic diastolic congestive heart failure Chronic  


 


HTN (hypertension) Chronic  


 


Paroxysmal atrial fibrillation Chronic  


 


Pulmonary hypertension Chronic

## 2018-01-20 NOTE — SOAPPROG
SOAP Progress Note


Assessment/Plan: 


Assessment: POD#31 Redo median sternotomy, explant old MV ring, implant #25 

Magna mitral bioprosthesis, TVA #28 Anderson MC3 ring, CoxMaze IV, placement LV 

epicardial lead tunneled to left subclavicular space


POD#25 tracheostomy


POD#13 left thoracentesis, 650 ml


RUE PICC





Severe MR/failed MV rpr - Ring exchanged for tissue MVR. Antithrombotic 

prophylaxis with Coumadin as per existing parameters for PAF.





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 Antithrombotic prophylaxis as per 

MVR.





Paroxysmal atrial fib/flutter, chronically anticoagulated with Coumadin - 

Predominant post CM4 rhythm sinus with PACs and intermittent PAF. No high 

degree block or need for pacemaker. AF prophylaxis with amio, BB, +/- CCB. No 

recent AF. Antithrombotic prophylaxis with coumadin for [id number] score of 7.





Acute on chronic dCHF - Class II-III on maximal medical therapy. Off CPB 

without inotropic or pressor support. Significant volume overload gradually 

diuresed. Intermittent exacerbations pulmonary edema related to 

tachyarrhythmias. Moderate sized left pleural effusion tapped. 





Ventilator dependent respiratory failure - Trach placed s/p 2 failed trials of 

extubation. Paretic left hemidiaphragm (abnormal pericardial anatomy), 

bronchitis (possible LLL PNA), resp muscle fatigue, bronchomalacia, and 

possible AARON (hx nocturnal hypoxemia suppl w 2 lpm O2) contributing factors. 

Course of IV Cefepime completed. Therapeutic bronchoscopies per pulm. Weaned to 

trach collar, PMSV and BiPAP. Swallow passed and on dysphagia diet. Skilled for 

LTAC for recurrent episodes of hypercapnia/hypoxemia but placement denied by 

insurance.





Acute expected blood loss anemia with coagulopathy - Exacerbated by preop 

lovenox and CPB. Refractory to fairly significant correction with blood and 

blood products. Ultimately responsive to Factor VII. Thrombocytopenia resolved. 

Anticoag resumed without incident.





Postoperative pressure ulcers - Multiple sites. Followed by wound care.











Plan:


Cont supportive care as per multidisciplinary team.


Inc metoprolol tartrate to 25 mg BID.


Inc coumadin to 2.5 mg today.


Dispo - Continue SDU monitoring until respiratory status stable for SNF vs 

home. 








01/20/18 07:59














Subjective: 





Doing ok. Enjoying breakfast. Improved tolerance of BiPAP.


Objective: 





 Vital Signs











Temp Pulse Resp BP Pulse Ox


 


 36.4 C   77   29 H  137/72 H  98 


 


 01/20/18 07:51  01/20/18 07:51  01/20/18 07:51  01/20/18 07:51  01/20/18 07:51








 Laboratory Results





 01/15/18 06:05 





 01/16/18 04:34 





 











 01/19/18 01/20/18 01/21/18





 05:59 05:59 05:59


 


Intake Total 1650 350 


 


Output Total 1200 1935 


 


Balance 450 -1585 








 











PT  22.5 SEC (12.0-15.0)  H  01/20/18  03:52    


 


INR  1.97  (0.83-1.16)  H  01/20/18  03:52    








HR and rhythm controlled. 


Upward trending BP.


VFSS yest essentially nl. 


TC during day with BiPAP before naps or sleep.








Physical Exam





- Physical Exam


General Appearance: alert, no apparent distress


Respiratory: decreased breath sounds (left base, o/w CTA)


Cardiac/Chest: regular rate, rhythm, other (Sternum grossly stable. Sternotomy 

and CT sites healing well. Dermabond beginning to slough)


Abdomen: non-tender, soft


Skin: warm/dry


Extremities: other (trace -1+ perimalleolar edema)





ICD10 Worksheet


Patient Problems: 


 Problems











Problem Status Onset


 


Acute blood loss anemia Acute  


 


Coagulopathy Acute  


 


S/P Maze operation for atrial fibrillation Acute  ~12/20/17


 


S/P mitral valve replacement with bioprosthetic valve Acute  ~12/20/17


 


S/P tricuspid valve repair Acute  ~12/20/17


 


Secondary tricuspid regurgitation Acute  


 


Severe mitral regurgitation Acute  


 


s/p placement LV epicardial lead Acute  ~12/20/17


 


Ascending aorta enlargement Chronic  


 


Atrial enlargement, bilateral Chronic  


 


Chronic anticoagulation Chronic  


 


Chronic diastolic congestive heart failure Chronic  


 


HTN (hypertension) Chronic  


 


Paroxysmal atrial fibrillation Chronic  


 


Pulmonary hypertension Chronic

## 2018-01-20 NOTE — PDINTPN
Intensivist Progress Note


Assessment/Plan: 


Assessment/plan:


* Acute Respiratory failure, recurrent, with hypoxia and hypercapnia likely 2/2 

advanced age with diaphragmatic dysfunction and multiple failed extubations.  

She has gone 24 hr without any hypoxemic events requiring suctioning.


      -continue BiPAP at night and with naps during daytime.  





* Status post tracheostomy-downsized to a 6.0.  Subsequently up sized to a 7.0.


      -will push capping trials today.


      -BiPAP by mask





* History of paralyzed diaphragm





* Secretions:  Improved.  





* FEN- eating, appetite better.





* Hypernatremia- increased FW 12/29.  Sodium last 141, following intermittently.





* Afib- on amiodarone and metoprolol, oral diltiazem. In NSR now.





* s/p MVR redo, TV annuloplasty.  On full-dose anticoagulation. Coumadin at 1 

mg per day





* Anemia; stable





* GI prophylaxis- pantoprazole.





* PT/OT-increase aggressive physical





* Nutrition-appears adequate.





* Disposition-I had a long discussion with the medical director of [facility].  He states emphatically the patient does not meet criteria for long-

term acute care hospital.  To this regard, we will keep her here for now.





Subjective: 





Sitting up eating eating.  Resting comfortably.  Was on mask BiPAP last night 

for 2 hr, and then was switched to BiPAP per trach


Objective: 





 Vital Signs











Temp Pulse Resp BP Pulse Ox


 


 36.4 C   77   29 H  137/72 H  98 


 


 01/20/18 07:51  01/20/18 07:51  01/20/18 07:51  01/20/18 07:51  01/20/18 07:51








 Laboratory Results





 01/15/18 06:05 





 01/16/18 04:34 





 











 01/19/18 01/20/18 01/21/18





 05:59 05:59 05:59


 


Intake Total 1650 350 


 


Output Total 1200 1935 


 


Balance 450 -1585 








 











PT  22.5 SEC (12.0-15.0)  H  01/20/18  03:52    


 


INR  1.97  (0.83-1.16)  H  01/20/18  03:52    














- Time Spent With Patient


Time Spent With Patient: 





35 min of time spent with patient, over 1/2 involved with coordination of care 

or counseling





Physical Exam





- Physical Exam


General Appearance: alert, no apparent distress


EENT: PERRL/EOMI, normal ENT inspection


Neck: non-tender, other (Trach site clean and dry)


Respiratory: normal breath sounds, prolonged expiration, No respiratory distress


Cardiac/Chest: normal peripheral pulses, regular rate, rhythm, systolic murmur


Peripheral Pulses: 2+: carotid (R), carotid (L), femoral (R), femoral (L), 

dorsalis-pedis (R), dorsalis-pedis (L)


Abdomen: normal bowel sounds, non-tender, soft


Pelvic Exam: deferred


Rectal: deferred


Skin: normal color, warm/dry


Extremities: normal range of motion, non-tender, normal inspection, normal 

capillary refill


Neuro/Psych: no motor/sensory deficits, alert, normal mood/affect, oriented x 3





ICD10 Worksheet


Patient Problems: 


 Problems











Problem Status Onset


 


Acute blood loss anemia Acute  


 


Coagulopathy Acute  


 


S/P Maze operation for atrial fibrillation Acute  ~12/20/17


 


S/P mitral valve replacement with bioprosthetic valve Acute  ~12/20/17


 


S/P tricuspid valve repair Acute  ~12/20/17


 


Secondary tricuspid regurgitation Acute  


 


Severe mitral regurgitation Acute  


 


s/p placement LV epicardial lead Acute  ~12/20/17


 


Ascending aorta enlargement Chronic  


 


Atrial enlargement, bilateral Chronic  


 


Chronic anticoagulation Chronic  


 


Chronic diastolic congestive heart failure Chronic  


 


HTN (hypertension) Chronic  


 


Paroxysmal atrial fibrillation Chronic  


 


Pulmonary hypertension Chronic

## 2018-01-21 NOTE — SOAPPROG
SOAP Progress Note


Assessment/Plan: 


Assessment: POD#32 Redo median sternotomy, explant old MV ring, implant #25 

Magna mitral bioprosthesis, TVA #28 Anderson MC3 ring, CoxMaze IV, placement LV 

epicardial lead tunneled to left subclavicular space


POD#26 tracheostomy


POD#14 left thoracentesis, 650 ml


RUE PICC





Severe MR/failed MV rpr - Ring exchanged for tissue MVR. Antithrombotic 

prophylaxis with Coumadin as per existing parameters for PAF.





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 Antithrombotic prophylaxis as per 

MVR.





Paroxysmal atrial fib/flutter, chronically anticoagulated with Coumadin - 

Predominant post CM4 rhythm sinus with PACs and intermittent PAF. No high 

degree block or need for pacemaker. AF prophylaxis with amio, BB, +/- CCB. No 

recent AF. Antithrombotic prophylaxis with coumadin for [id number] score of 7.





Acute on chronic dCHF - Class II-III on maximal medical therapy. Off CPB 

without inotropic or pressor support. Significant volume overload gradually 

diuresed. Intermittent exacerbations pulmonary edema related to 

tachyarrhythmias. Moderate sized left pleural effusion tapped. 





Ventilator dependent respiratory failure - Trach placed s/p 2 failed trials of 

extubation. Paretic left hemidiaphragm (abnormal pericardial anatomy), 

bronchitis (possible LLL PNA), resp muscle fatigue, bronchomalacia, and 

possible AARON (hx nocturnal hypoxemia suppl w 2 lpm O2) contributing factors. 

Course of IV Cefepime completed. Therapeutic bronchoscopies per pulm. Weaned to 

trach collar, PMSV and BiPAP. Swallow passed and on dysphagia diet. Skilled for 

LTAC for recurrent episodes of hypercapnia/hypoxemia but placement denied by 

insurance.





Acute expected blood loss anemia with coagulopathy - Exacerbated by preop 

lovenox and CPB. Refractory to fairly significant correction with blood and 

blood products. Ultimately responsive to Factor VII. Thrombocytopenia resolved. 

Anticoag resumed without incident.





Postoperative pressure ulcers - Multiple sites. Followed by wound care.











Plan:


Cont supportive care as per multidisciplinary team.


Inc metoprolol tartrate to 50 mg BID.


Decr Coumadin to 1.25 mg today. Suspect could alt 1.25/2.5 mg qod.


Cont daily KCl 20 meq.


Dispo - Continue SDU monitoring until respiratory status stable for SNF vs 

home. 








01/21/18 08:10











Subjective: 





Working with RT and waves hello


Objective: 





 Vital Signs











Temp Pulse Resp BP Pulse Ox


 


 36.7 C   82   29 H  153/73 H  97 


 


 01/20/18 23:40  01/21/18 06:46  01/21/18 06:46  01/21/18 06:46  01/21/18 06:46








 Laboratory Results





 01/15/18 06:05 





 01/21/18 06:15 





 











 01/20/18 01/21/18 01/22/18





 05:59 05:59 05:59


 


Intake Total 350 350 


 


Output Total 1935 1400 


 


Balance -1585 -1050 








 











PT  27.1 SEC (12.0-15.0)  H  01/21/18  06:15    


 


INR  2.52  (0.83-1.16)  H  01/21/18  06:15    








HR and rhythm controlled. Ongoing upward SBP creep.


Ongoing CO2 retention.


Therapeutic INR.





Physical Exam





- Physical Exam


General Appearance: alert, no apparent distress


Cardiac/Chest: regular rate, rhythm





ICD10 Worksheet


Patient Problems: 


 Problems











Problem Status Onset


 


Acute blood loss anemia Acute  


 


Coagulopathy Acute  


 


S/P Maze operation for atrial fibrillation Acute  ~12/20/17


 


S/P mitral valve replacement with bioprosthetic valve Acute  ~12/20/17


 


S/P tricuspid valve repair Acute  ~12/20/17


 


Secondary tricuspid regurgitation Acute  


 


Severe mitral regurgitation Acute  


 


s/p placement LV epicardial lead Acute  ~12/20/17


 


Ascending aorta enlargement Chronic  


 


Atrial enlargement, bilateral Chronic  


 


Chronic anticoagulation Chronic  


 


Chronic diastolic congestive heart failure Chronic  


 


HTN (hypertension) Chronic  


 


Paroxysmal atrial fibrillation Chronic  


 


Pulmonary hypertension Chronic

## 2018-01-21 NOTE — PDINTPN
Intensivist Progress Note


Assessment/Plan: 


Assessment/plan:


* Acute hypercarbic Respiratory failure, recurrent, with hypoxia and 

hypercapnia likely 2/2 advanced age with diaphragmatic dysfunction and multiple 

failed extubations.  


      -continue ventilation at night using BiPAP





* Status post tracheostomy-downsized to a 6.0.  Subsequently up sized to a 7.0.


      -will push capping trials today.  Does well on [place] valve


      -BiPAP at night





* History of paralyzed diaphragm





* Secretions:  Improved.  





* FEN- eating, appetite better.





* Hypernatremia- increased FW 12/29.  Sodium last 141, following intermittently.





* Afib- on amiodarone and metoprolol, oral diltiazem. In NSR now.





* s/p MVR redo, TV annuloplasty.  On full-dose anticoagulation. Coumadin at 1 

mg per day





* Anemia; stable





* GI prophylaxis- pantoprazole.





* PT/OT-increase aggressive physical





* Nutrition-appears adequate.





* Disposition-I had a long discussion with the medical director of [facility].  He states emphatically the patient does not meet criteria for long-

term acute care hospital.  To this regard, we will keep her here for now.





Subjective: 





Sitting up in chair.  Comfortable.  Doing well with physical therapy.


Objective: 





 Vital Signs











Temp Pulse Resp BP Pulse Ox


 


 36.7 C   87   21 H  132/49 H  93 


 


 01/20/18 23:40  01/21/18 09:00  01/21/18 09:00  01/21/18 09:00  01/21/18 09:00








 Laboratory Results





 01/15/18 06:05 





 01/21/18 06:15 





 











 01/20/18 01/21/18 01/22/18





 05:59 05:59 05:59


 


Intake Total 350 350 


 


Output Total 1935 1400 


 


Balance -1585 -1050 








 











PT  27.1 SEC (12.0-15.0)  H  01/21/18  06:15    


 


INR  2.52  (0.83-1.16)  H  01/21/18  06:15    














- Time Spent With Patient


Time Spent With Patient: 





35 min of time spent with patient, over 1/2 involved counseling coordination of 

care.  Case discussed with respiratory therapy and nursing





Physical Exam





- Physical Exam


General Appearance: alert, no apparent distress


EENT: PERRL/EOMI, normal ENT inspection


Neck: non-tender, full range of motion, other (Trach site clean and dry)


Respiratory: chest non-tender, lungs clear, normal breath sounds


Cardiac/Chest: normal peripheral pulses, regular rate, rhythm, systolic murmur


Peripheral Pulses: 2+: carotid (R), carotid (L), femoral (R), femoral (L), 

dorsalis-pedis (R), dorsalis-pedis (L)


Abdomen: normal bowel sounds, non-tender, soft


Pelvic Exam: deferred


Rectal: deferred


Skin: normal color, warm/dry


Extremities: normal range of motion, non-tender, normal inspection, normal 

capillary refill


Neuro/Psych: alert, normal mood/affect, oriented x 3





ICD10 Worksheet


Patient Problems: 


 Problems











Problem Status Onset


 


Acute blood loss anemia Acute  


 


Coagulopathy Acute  


 


S/P Maze operation for atrial fibrillation Acute  ~12/20/17


 


S/P mitral valve replacement with bioprosthetic valve Acute  ~12/20/17


 


S/P tricuspid valve repair Acute  ~12/20/17


 


Secondary tricuspid regurgitation Acute  


 


Severe mitral regurgitation Acute  


 


s/p placement LV epicardial lead Acute  ~12/20/17


 


Ascending aorta enlargement Chronic  


 


Atrial enlargement, bilateral Chronic  


 


Chronic anticoagulation Chronic  


 


Chronic diastolic congestive heart failure Chronic  


 


HTN (hypertension) Chronic  


 


Paroxysmal atrial fibrillation Chronic  


 


Pulmonary hypertension Chronic

## 2018-01-21 NOTE — WOCRNPDOC
WOCRN Advanced Assessment Note





- Skin Integrity Problem, Advanced Assess





  ** Anterior Neck


Dressing Type: Other


Other Dressing Type: Drawtex trach dressing


Dressing Description: Clean/Dry, Intact


Exudate Amount: Scant


Exudate Color: Reddish/Yellow


Exudate Characteristic(s): Serosanguinous


Integumentary Issue Intervention: Visualized Under Dressing


Josette Wound Tissue: Blanching, Erythema


Josette Wound Swelling: Mild


Wound Bed Color: Red


Wound Bed Constitution: Red/Pink - Non Granular Tissue


Site Odor: None


Pressure Injury Stage: Stage 3, Medical Device Related Pressure Injury


Pressure Injury Present on Admit: No


Skin Integrity Problem Comment: Full-thickness wound distal to trach site w/ 

scant exudate. Periwound skin remains intact and blanching. Trach now capped, 

removal pending patient's ability to tolerate bi-pap. Will continue w/ 

protective trach dressing; dressing will need to be re-evaluated once trach is 

removed. Staff RN Cheli present and assisting.





  ** Left Medial Gluteal Cleft Pressure Injury


Dressing Type: Allevyn Life


Dressing Description: Clean/Dry, Intact


Exudate Amount: None


Exudate Characteristic(s): None


Integumentary Issue Intervention: Dressing Changed, Dressing Initialed & Dated, 

Hydrogel Applied


Josette Wound Tissue: Blanching, Erythema, Thin, Dry


Josette Wound Swelling: Mild


Wound Bed Color: Red


Wound Bed Constitution: Red/Pink - Non Granular Tissue


Site Odor: None


Pressure Injury Stage: Stage 2


Pressure Injury Present on Admit: No


Skin Integrity Problem Comment: Wound remains unchanged since previous 

assessment on 1/19. reiterated the importance of off-loading area at all times. 

Patient is up in chair for most of the day and, while she is on a pressure-

relieving cushion, the pressure over the wound is not fully relieved. Nursing 

and patient have been advised to place a rolled blanket or towel under R/L 

buttock, alternating, to relieve pressure. Will re-assess on Thursday, 1/25.

## 2018-01-22 NOTE — PDINTPN
Intensivist Progress Note


Assessment/Plan: 


Assessment/plan:








77 F known to me from initial hospitalization when she was admitted for MVR redo

, TV repair and Maze procedure, complicated by recurrent hypercarbic 

respiratory failure and requiring trach placed 12/2017. Also with initial 

periods of PAF and possible PNA now much more stable from these fronts. Known 

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 

obscuring most of LLL. Known nocturnal hypoxemia preop, but AHI only 4.8 on PSG 

11/2016. 





* Acute Respiratory failure with hypoxia and hypercapnia likely 2/2 advanced 

age with diaphragmatic dysfunction and multiple failed extubations. Trach 

placed 12/26 by ENT in OR. Cannot rule out contribution from PNA as well given 

elevated WBC back in 12/2017 and treated with abx. Trach downsized to 6 then 

back to 7 and now stable. Tolerating PMV during the day and vent overnight. She 

said she liked Bipap better than vent with beter VS and tolerance, which is 

opposite of expected. Previous workup including normal sleep study (no central 

apnea), normal Mg, Phos, calcium, eosinophils, normal TSH/free T4. ABG 

continues to show acute on chronic respiratory acidosis including 1/20 at 2350. 

Per patient request, will try nasal bipap tonight with ABG before and end (

middle if distress) to assess efficacy. More likely to respond to trach 

ventilation. Will also start provera at 10 mg po tid as central respiratory 

stimulant as well as fluroscopic sniff test to assess diaphragmatic function. 

Change xanax to prn, though not likely large contributor


* Afib - controlled on amiodarone/metoprolol


* 


Subjective: 





Reported poor sleep on trach/vent versus bipap. 


Objective: 





 Vital Signs











Temp Pulse Resp BP Pulse Ox


 


 37.1 C   62   24 H  146/59 H  98 


 


 01/22/18 11:33  01/22/18 11:33  01/22/18 11:33  01/22/18 11:33  01/22/18 11:33








 Laboratory Results





 01/15/18 06:05 





 01/21/18 06:15 





 











 01/21/18 01/22/18 01/23/18





 05:59 05:59 05:59


 


Intake Total 350 1550 


 


Output Total 1400 800 


 


Balance -1050 750 








 











PT  28.6 SEC (12.0-15.0)  H  01/22/18  05:10    


 


INR  2.70  (0.83-1.16)  H  01/22/18  05:10    














Physical Exam





- Physical Exam


General Appearance: WD/WN, alert, no apparent distress


EENT: PERRL/EOMI, other (trach site ok)


Neck: supple


Respiratory: lungs clear, normal breath sounds, No respiratory distress, No 

accessory muscle use, No wheezing


Abdomen: non-tender, soft, No distended


Skin: normal color, warm/dry, No cyanosis


Lymphatic: no adenopathy


Extremities: No pedal edema


Neuro/Psych: alert, normal mood/affect, oriented x 3





ICD10 Worksheet


Patient Problems: 


 Problems











Problem Status Onset


 


Acute blood loss anemia Acute  


 


Coagulopathy Acute  


 


S/P Maze operation for atrial fibrillation Acute  ~12/20/17


 


S/P mitral valve replacement with bioprosthetic valve Acute  ~12/20/17


 


S/P tricuspid valve repair Acute  ~12/20/17


 


Secondary tricuspid regurgitation Acute  


 


Severe mitral regurgitation Acute  


 


s/p placement LV epicardial lead Acute  ~12/20/17


 


Ascending aorta enlargement Chronic  


 


Atrial enlargement, bilateral Chronic  


 


Chronic anticoagulation Chronic  


 


Chronic diastolic congestive heart failure Chronic  


 


HTN (hypertension) Chronic  


 


Paroxysmal atrial fibrillation Chronic  


 


Pulmonary hypertension Chronic

## 2018-01-22 NOTE — SOAPPROG
SOAP Progress Note


Assessment/Plan: 


POD #33: Redo median sternotomy, explant old MV ring, implant #25 Magna mitral 

bioprosthesis, TVA #28 Anderson MC3 ring, CoxMaze IV, placement LV epicardial 

lead tunneled to left subclavicular space


POD #27: Open tracheostomy placement 





Severe MR/failed MV rpr - Ring exchanged for tissue MVR. Antithrombotic 

prophylaxis as per CM 4.





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 Antithrombotic prophylaxis as per 

CM 4.





Paroxysmal atrial fib/flutter s/p CM 4 -  continue amiodarone and BB. 

Thromboprophylaxis with Coumadin.  





Class II Chronic dCHF - medically optimized. Continue beta-blocker and Lasix 

prn. 





Ventilator dependent respiratory failure - s/p trach placement weaned to TC/

BiPAP with sporadic episodes of hypoxemia and hypercapnia. Bronchoscopy 1/11 

without significant findings. Continue supportive care as per pulmonology.


 


Acute blood loss anemia with resolved thrombocytopenia and coagulopathy - 

stable. 





DVT prophylaxis - Continue Coumadin/SCDs.  





Lines/drains - RUE PICC





Nutrition - PO





Disposition - LTAC refused patient. Continue rehabilitation at [facility] until stable 

for SNF vs home.  








Subjective: 


Had a good day yesterday. Slept 2 hours straight on BiPAP and will start using 

it through the night.  


Objective: 





 Vital Signs











Temp Pulse Resp BP Pulse Ox


 


 36.6 C   71   23 H  155/65 H  99 


 


 01/22/18 05:09  01/22/18 05:09  01/22/18 05:09  01/22/18 05:09  01/22/18 05:09








 Laboratory Results





 01/15/18 06:05 





 01/21/18 06:15 





 











 01/21/18 01/22/18 01/23/18





 05:59 05:59 05:59


 


Intake Total 350 1550 


 


Output Total 1400 800 


 


Balance -1050 750 








 











PT  28.6 SEC (12.0-15.0)  H  01/22/18  05:10    


 


INR  2.70  (0.83-1.16)  H  01/22/18  05:10    














Physical Exam





- Physical Exam


General Appearance: WD/WN, alert, no apparent distress


EENT: No scleral icterus (R), No scleral icterus (L)


Neck: other (trach in place)


Respiratory: No respiratory distress


Cardiac/Chest: regular rate, rhythm


Abdomen: non-tender, soft, No distended


Skin: normal color, warm/dry


Extremities: No pedal edema


Neuro/Psych: no motor/sensory deficits, alert, normal mood/affect, oriented x 3





ICD10 Worksheet


Patient Problems: 


 Problems











Problem Status Onset


 


Acute blood loss anemia Acute  


 


Coagulopathy Acute  


 


S/P Maze operation for atrial fibrillation Acute  ~12/20/17


 


S/P mitral valve replacement with bioprosthetic valve Acute  ~12/20/17


 


S/P tricuspid valve repair Acute  ~12/20/17


 


Secondary tricuspid regurgitation Acute  


 


Severe mitral regurgitation Acute  


 


s/p placement LV epicardial lead Acute  ~12/20/17


 


Ascending aorta enlargement Chronic  


 


Atrial enlargement, bilateral Chronic  


 


Chronic anticoagulation Chronic  


 


Chronic diastolic congestive heart failure Chronic  


 


HTN (hypertension) Chronic  


 


Paroxysmal atrial fibrillation Chronic  


 


Pulmonary hypertension Chronic

## 2018-01-23 NOTE — ASMTCMCOM
CM Note

 

CM Note                       

Notes:

Therapy would like to recommend In-pt Rehab. Spoke to admissions and they report that patient's 

ins, [employer] recommended that if patient needed more care after discharge she should go to a SNF 

Rehab. 

 

Date Signed:  01/23/2018 03:31 PM

Electronically Signed By:Poonam Dawkins LCSW

## 2018-01-23 NOTE — PDINTPN
Intensivist Progress Note


Assessment/Plan: 


Assessment/plan:








77 F known to me from initial hospitalization when she was admitted for MVR redo

, TV repair and Maze procedure, complicated by recurrent hypercarbic 

respiratory failure and requiring trach placed 12/2017. Also with initial 

periods of PAF and possible PNA now much more stable from these fronts. Known 

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 

obscuring most of LLL. Known nocturnal hypoxemia preop, but AHI only 4.8 on PSG 

11/2016. 





* Acute Respiratory failure with hypoxia and hypercapnia likely 2/2 advanced age

, weakness with element of diaphragmatic dysfunction and multiple failed 

extubations. Trach placed 12/26 by ENT in OR. Cannot rule out contribution from 

PNA as well given elevated WBC back in 12/2017 and treated with full course of 

abx. Trach downsized to 6 then back to 7 and now stable. Previous workup 

including normal sleep study (no apnea), normal Mg, Phos, calcium, eosinophils, 

normal TSH/free T4. ABG continues to show acute on chronic respiratory acidosis 

including 1/20 at 2350. Started provera 1/22 and tolerated nasal bipap well 

without change in ABG pre- and post. By using her trach, we may have been 

inadvertently eliminating HCO3 which she needed for chronic respiratory 

acidosis 2/2/ hypoventilation. Continue bipap via nasal mask as long as she can 

tolerate this approach and keep trach for now. 


* 


* Afib - controlled on amiodarone/metoprolol


* 


01/23/18 15:31





Subjective: 





Tolerated nasal bipap well last pm


Objective: 





 Vital Signs











Temp Pulse Resp BP Pulse Ox


 


 36.5 C   61   20   178/73 H  100 


 


 01/23/18 11:42  01/23/18 11:42  01/23/18 11:42  01/23/18 11:42  01/23/18 11:42








 Laboratory Results





 01/15/18 06:05 





 01/21/18 06:15 





 











 01/22/18 01/23/18 01/24/18





 05:59 05:59 05:59


 


Intake Total 1550 1750 


 


Output Total 800 1250 


 


Balance 750 500 








 











PT  29.1 SEC (12.0-15.0)  H  01/23/18  05:55    


 


INR  2.76  (0.83-1.16)  H  01/23/18  05:55    














Physical Exam





- Physical Exam


General Appearance: WD/WN, alert, no apparent distress


EENT: PERRL/EOMI


Neck: supple


Respiratory: lungs clear, normal breath sounds, No respiratory distress, No 

accessory muscle use


Cardiac/Chest: regular rate, rhythm, No edema


Abdomen: non-tender, soft, No distended


Skin: normal color, warm/dry, No cyanosis


Lymphatic: no adenopathy


Extremities: No pedal edema


Neuro/Psych: alert, normal mood/affect, oriented x 3





ICD10 Worksheet


Patient Problems: 


 Problems











Problem Status Onset


 


Acute blood loss anemia Acute  


 


Coagulopathy Acute  


 


S/P Maze operation for atrial fibrillation Acute  ~12/20/17


 


S/P mitral valve replacement with bioprosthetic valve Acute  ~12/20/17


 


S/P tricuspid valve repair Acute  ~12/20/17


 


Secondary tricuspid regurgitation Acute  


 


Severe mitral regurgitation Acute  


 


s/p placement LV epicardial lead Acute  ~12/20/17


 


Ascending aorta enlargement Chronic  


 


Atrial enlargement, bilateral Chronic  


 


Chronic anticoagulation Chronic  


 


Chronic diastolic congestive heart failure Chronic  


 


HTN (hypertension) Chronic  


 


Paroxysmal atrial fibrillation Chronic  


 


Pulmonary hypertension Chronic

## 2018-01-23 NOTE — SOAPPROG
SOAP Progress Note


Assessment/Plan: 


Assessment: POD#34 Redo median sternotomy, explant old MV ring, implant #25 

Magna mitral bioprosthesis, TVA #28 Anderson MC3 ring, CoxMaze IV, placement LV 

epicardial lead tunneled to left subclavicular space


POD#28 tracheostomy


POD#16 left thoracentesis, 650 ml


RUE PICC





Severe MR/failed MV rpr - Ring exchanged for tissue MVR. Antithrombotic 

prophylaxis with Coumadin as per existing parameters for PAF.





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 Antithrombotic prophylaxis as per 

MVR.





Paroxysmal atrial fib/flutter, chronically anticoagulated with Coumadin - Early 

post CM4 rhythm sinus with PACs and intermittent PAF w RVR. Loaded with amio 

and restarted on BB. Metoprolol dose adjusted to BP. No AF last 2 wks. Some 

sinus jacki as of late. Antithrombotic prophylaxis with coumadin for [id number]

VASc score of 7.





Acute on chronic dCHF - Class II-III on maximal medical therapy. Off CPB 

without inotropic or pressor support. Significant volume overload gradually 

diuresed. Intermittent exacerbations pulmonary edema related to 

tachyarrhythmias. Moderate sized left pleural effusion tapped. Small residual 

effusion stable.





Ventilator dependent respiratory failure - Trach placed s/p 2 failed trials of 

extubation. Paretic left hemidiaphragm (abnormal pericardial anatomy), 

bronchitis (possible LLL PNA), resp muscle fatigue, bronchomalacia, and chronic 

nocturnal hypoxemia (hx 2 lpm O2 hs) contributing factors. Course of IV 

Cefepime completed. Therapeutic bronchoscopies per pulm. Weaned to trach collar

, PMSV and nasal BiPAP. Swallow passed and on dysphagia diet. Skilled for LTAC 

for recurrent episodes of hypercapnia/hypoxemia but placement denied by 

insurance.





Acute expected blood loss anemia with coagulopathy - Exacerbated by preop 

lovenox and CPB. Refractory to fairly significant correction with blood and 

blood products. Ultimately responsive to Factor VII. Thrombocytopenia resolved. 

Anticoag resumed without incident.





Postoperative pressure ulcers - Multiple sites. Followed by wound care.











Plan:


Cont supportive care as per multidisciplinary team.


Stop amiodarone.


Cont metoprolol tartrate 50 mg BID.


Trial losartan 25 mg today.


Resume Coumadin 1 mg today. Suspect could alt 1 mg & 2 mg qod.


Dispo - Continue SDU monitoring until respiratory status stable for SNF vs 

home. 








01/23/18 08:16











Subjective: 





Doing ok. Eating a little more. Tolerating capped trach and nasal BiPAP. 

Currently worried about high blood pressure.


Objective: 





 Vital Signs











Temp Pulse Resp BP Pulse Ox


 


 35.9 C L  75   15   159/79 H  98 


 


 01/23/18 07:49  01/23/18 07:49  01/23/18 07:49  01/23/18 07:49  01/23/18 07:49








 Laboratory Results





 01/15/18 06:05 





 01/21/18 06:15 





 











 01/22/18 01/23/18 01/24/18





 05:59 05:59 05:59


 


Intake Total 1550 1750 


 


Output Total 800 1250 


 


Balance 750 500 








 











PT  29.1 SEC (12.0-15.0)  H  01/23/18  05:55    


 


INR  2.76  (0.83-1.16)  H  01/23/18  05:55    








SNIFF yest neg for parlayzed LHD but did show slightly decreased excursion.


HR well controlled. Some jacki upper 50s.


SBP for the most part 150s+, ?reactive to inc RR.


INR still sensitive to low dose Coumadin.











Physical Exam





- Physical Exam


General Appearance: alert, no apparent distress


Respiratory: decreased breath sounds (left base o/w CTA)


Cardiac/Chest: regular rate, rhythm, other (Sternum grossly stable. Sternotomy 

and CT sites healing well.)


Abdomen: non-tender, soft


Skin: warm/dry


Extremities: swelling (trace dependent)





ICD10 Worksheet


Patient Problems: 


 Problems











Problem Status Onset


 


Acute blood loss anemia Acute  


 


Coagulopathy Acute  


 


S/P Maze operation for atrial fibrillation Acute  ~12/20/17


 


S/P mitral valve replacement with bioprosthetic valve Acute  ~12/20/17


 


S/P tricuspid valve repair Acute  ~12/20/17


 


Secondary tricuspid regurgitation Acute  


 


Severe mitral regurgitation Acute  


 


s/p placement LV epicardial lead Acute  ~12/20/17


 


Ascending aorta enlargement Chronic  


 


Atrial enlargement, bilateral Chronic  


 


Chronic anticoagulation Chronic  


 


Chronic diastolic congestive heart failure Chronic  


 


HTN (hypertension) Chronic  


 


Paroxysmal atrial fibrillation Chronic  


 


Pulmonary hypertension Chronic

## 2018-01-24 NOTE — PDINTPN
Intensivist Progress Note


Assessment/Plan: 


Assessment/plan:








77 F known to me from initial hospitalization when she was admitted for MVR redo

, TV annuloplasty and Maze procedure, complicated by recurrent hypercarbic 

respiratory failure and requiring trach placed 12/2017. Also with initial 

periods of PAF and possible PNA now much more stable from these fronts. Known 

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 

obscuring most of LLL. Known nocturnal hypoxemia preop, but AHI only 4.8 on PSG 

11/2016. 





* Acute Respiratory failure with hypoxia and hypercapnia likely 2/2 advanced age

, weakness with element of mild diaphragmatic dysfunction and multiple failed 

extubations. Trach placed 12/26 by ENT in OR. Cannot rule out contribution from 

PNA as well given elevated WBC back in 12/2017 and treated with full course of 

abx. Trach downsized to 6 then back to 7 and now stable. Previous workup 

including normal sleep study (no apnea), normal Mg, Phos, calcium, eosinophils, 

normal TSH/free T4. ABG continues to show acute on chronic respiratory acidosis 

including 1/20 at 2350. Started provera 1/22 and tolerated nasal bipap well 

without change in ABG pre- and post. By using her trach, we may have been 

inadvertently eliminating HCO3 which she needed for chronic respiratory 

acidosis 2/2/ hypoventilation. Continue bipap via nasal mask as long as she can 

tolerate this approach and keep trach for now. Trach has been capped sine AM 1/ 22 and using nasal bipap qhs. Will check abg tonight, but if she continues to 

improve may be bale to decannulate later this week. 


* S/P MVR redo/TVA and stable from this perspective. Remains on coumadin and 

pharmacy to dose


* Afib - controlled on amiodarone/metoprolol


* Anemia- postop- no recent transfusions


* 


* 


01/23/18 15:31





01/24/18 13:45





Subjective: 





Continues to improve tolerance of bipap and 


Objective: 





 Vital Signs











Temp Pulse Resp BP Pulse Ox


 


 36.7 C   65   20   174/73 H  100 


 


 01/24/18 11:35  01/24/18 11:35  01/24/18 11:35  01/24/18 11:35  01/24/18 11:35








 Laboratory Results





 01/15/18 06:05 





 01/21/18 06:15 





 











 01/23/18 01/24/18 01/25/18





 05:59 05:59 05:59


 


Intake Total 1750 950 


 


Output Total 1250 1200 


 


Balance 500 -250 








 











PT  29.5 SEC (12.0-15.0)  H  01/24/18  04:10    


 


INR  2.81  (0.83-1.16)  H  01/24/18  04:10    














Physical Exam





- Physical Exam


General Appearance: WD/WN, alert, no apparent distress


EENT: PERRL/EOMI, other (trach site clean and dry)


Neck: supple


Respiratory: lungs clear, normal breath sounds, No respiratory distress, No 

accessory muscle use


Cardiac/Chest: regular rate, rhythm, No edema


Abdomen: non-tender, soft, No distended


Skin: normal color, warm/dry, No cyanosis


Lymphatic: no adenopathy


Extremities: No pedal edema


Neuro/Psych: alert, normal mood/affect, oriented x 3





ICD10 Worksheet


Patient Problems: 


 Problems











Problem Status Onset


 


Acute blood loss anemia Acute  


 


Coagulopathy Acute  


 


S/P Maze operation for atrial fibrillation Acute  ~12/20/17


 


S/P mitral valve replacement with bioprosthetic valve Acute  ~12/20/17


 


S/P tricuspid valve repair Acute  ~12/20/17


 


Secondary tricuspid regurgitation Acute  


 


Severe mitral regurgitation Acute  


 


s/p placement LV epicardial lead Acute  ~12/20/17


 


Ascending aorta enlargement Chronic  


 


Atrial enlargement, bilateral Chronic  


 


Chronic anticoagulation Chronic  


 


Chronic diastolic congestive heart failure Chronic  


 


HTN (hypertension) Chronic  


 


Paroxysmal atrial fibrillation Chronic  


 


Pulmonary hypertension Chronic

## 2018-01-24 NOTE — SOAPPROG
SOAP Progress Note


Assessment/Plan: 


POD #35: Redo median sternotomy, explant old MV ring, implant #25 Magna mitral 

bioprosthesis, TVA #28 Anderson MC3 ring, CoxMaze IV, placement LV epicardial 

lead tunneled to left subclavicular space


POD #29: Open tracheostomy placement 





Severe MR/failed MV rpr - Ring exchanged for tissue MVR. Antithrombotic 

prophylaxis as per CM 4.





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 Antithrombotic prophylaxis as per 

CM 4.





Paroxysmal atrial fib/flutter s/p CM 4 -  continue BB. Amiodarone stopped d/t 

bradycardia. Thromboprophylaxis with Coumadin. Pharmacy to dose.  





Class II Chronic dCHF - medically optimized. Continue beta-blocker and Lasix 

prn. 





Ventilator dependent respiratory failure - s/p trach placement weaned to TC/

BiPAP with sporadic episodes of hypoxemia and hypercapnia. Bronchoscopy 1/11 

without significant findings. "Sniff" test was negative for abnormal motion of 

both hemidiaphragms. Continue supportive care as per pulmonology.


 


Acute blood loss anemia with resolved thrombocytopenia and coagulopathy - 

stable. 





DVT prophylaxis - Continue Coumadin/SCDs.  





Lines/drains - RUE PICC





Nutrition - PO





Disposition - LTAC refused patient. Continue rehabilitation at [facility] until stable 

for SNF vs home.  








Subjective: 


Didn't sleep well last night. No complaints otherwise. Happy BP is better 

controlled.


Objective: 





 Vital Signs











Temp Pulse Resp BP Pulse Ox


 


 36.8 C   68   17   169/71 H  98 


 


 01/24/18 04:10  01/24/18 04:10  01/24/18 04:10  01/24/18 05:40  01/24/18 04:10








 Laboratory Results





 01/15/18 06:05 





 01/21/18 06:15 





 











 01/23/18 01/24/18 01/25/18





 05:59 05:59 05:59


 


Intake Total 1750 950 


 


Output Total 1250 1200 


 


Balance 500 -250 








 











PT  29.5 SEC (12.0-15.0)  H  01/24/18  04:10    


 


INR  2.81  (0.83-1.16)  H  01/24/18  04:10    














Physical Exam





- Physical Exam


General Appearance: alert, no apparent distress


Neck: other (trach capped)


Respiratory: accessory muscle use


Cardiac/Chest: regular rate, rhythm


Abdomen: No distended


Skin: normal color, warm/dry


Extremities: No pedal edema


Neuro/Psych: no motor/sensory deficits, alert, normal mood/affect, oriented x 3





ICD10 Worksheet


Patient Problems: 


 Problems











Problem Status Onset


 


Acute blood loss anemia Acute  


 


Coagulopathy Acute  


 


S/P Maze operation for atrial fibrillation Acute  ~12/20/17


 


S/P mitral valve replacement with bioprosthetic valve Acute  ~12/20/17


 


S/P tricuspid valve repair Acute  ~12/20/17


 


Secondary tricuspid regurgitation Acute  


 


Severe mitral regurgitation Acute  


 


s/p placement LV epicardial lead Acute  ~12/20/17


 


Ascending aorta enlargement Chronic  


 


Atrial enlargement, bilateral Chronic  


 


Chronic anticoagulation Chronic  


 


Chronic diastolic congestive heart failure Chronic  


 


HTN (hypertension) Chronic  


 


Paroxysmal atrial fibrillation Chronic  


 


Pulmonary hypertension Chronic

## 2018-01-25 NOTE — PDINTPN
Intensivist Progress Note


Assessment/Plan: 


Assessment/plan:








77 F known to me from initial hospitalization when she was admitted for MVR redo

, TV annuloplasty and Maze procedure, complicated by recurrent hypercarbic 

respiratory failure and requiring trach placed 12/2017. Also with initial 

periods of PAF and possible PNA now much more stable from these fronts. Known 

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 

obscuring most of LLL. Known nocturnal hypoxemia preop, but AHI only 4.8 on PSG 

11/2016. 





* Acute Respiratory failure with hypoxia and hypercapnia likely 2/2 advanced age

, weakness with element of mild diaphragmatic dysfunction and multiple failed 

extubations. Trach placed 12/26 by ENT in OR. Cannot rule out contribution from 

PNA as well given elevated WBC back in 12/2017 and treated with full course of 

abx. Trach downsized to 6 then back to 7 and now stable. Previous workup 

including normal sleep study (no apnea), normal Mg, Phos, calcium, eosinophils, 

normal TSH/free T4. ABG continues to show acute on chronic respiratory acidosis 

including 1/20 at 2350. Started provera 1/22 and tolerated nasal bipap well 

without change in ABG pre- and post. By using her trach, we may have been 

inadvertently eliminating HCO3 which she needed for chronic respiratory 

acidosis 2/2/ hypoventilation. Trach has been capped sine AM 1/22 and using 

nasal bipap qhs. ABG pre-bipap with CO2 51- likely decannulate in AM


* S/P MVR redo/TVA and stable from this perspective. Remains on coumadin and 

pharmacy to dose


* Afib - controlled on metoprolol


* HTN- persistently high so added norvasc 5 mg this AM- she said she took this 

at home anyway


* Anemia- postop- no recent transfusions


* 


* Dispo: she may be approaching dc home soon and prefers not to go to rehab/

SNF. PT/OT actively working with her and family and will begin that process now 

and will likely include home care. Not likely to go home until mon/tuesday 

depending on how well she does without trach


* 


* 


Subjective: 





Continues to tolerate bipap qhs and remains capped without difficulty


Objective: 





 Vital Signs











Temp Pulse Resp BP Pulse Ox


 


 36.6 C   76   20   158/82 H  91 L


 


 01/25/18 11:52  01/25/18 11:52  01/25/18 11:52  01/25/18 11:52  01/25/18 11:52








 Laboratory Results





 01/15/18 06:05 





 01/21/18 06:15 





 











 01/24/18 01/25/18 01/26/18





 05:59 05:59 05:59


 


Intake Total 950 1000 


 


Output Total 1200 1500 


 


Balance -250 -500 








 











PT  28.3 SEC (12.0-15.0)  H  01/25/18  04:10    


 


INR  2.66  (0.83-1.16)  H  01/25/18  04:10    














Physical Exam





- Physical Exam


General Appearance: alert, no apparent distress


EENT: PERRL/EOMI


Neck: supple, other (trach site clean and dry)


Respiratory: lungs clear, normal breath sounds, No respiratory distress


Cardiac/Chest: regular rate, rhythm, No edema


Abdomen: non-tender, soft, No distended


Skin: normal color, warm/dry, No cyanosis


Lymphatic: no adenopathy


Extremities: No pedal edema


Neuro/Psych: alert, normal mood/affect, oriented x 3





ICD10 Worksheet


Patient Problems: 


 Problems











Problem Status Onset


 


Acute blood loss anemia Acute  


 


Coagulopathy Acute  


 


S/P Maze operation for atrial fibrillation Acute  ~12/20/17


 


S/P mitral valve replacement with bioprosthetic valve Acute  ~12/20/17


 


S/P tricuspid valve repair Acute  ~12/20/17


 


Secondary tricuspid regurgitation Acute  


 


Severe mitral regurgitation Acute  


 


s/p placement LV epicardial lead Acute  ~12/20/17


 


Ascending aorta enlargement Chronic  


 


Atrial enlargement, bilateral Chronic  


 


Chronic anticoagulation Chronic  


 


Chronic diastolic congestive heart failure Chronic  


 


HTN (hypertension) Chronic  


 


Paroxysmal atrial fibrillation Chronic  


 


Pulmonary hypertension Chronic

## 2018-01-26 NOTE — WOCRNPDOC
WOCRN Advanced Assessment Note





- Skin Integrity Problem, Advanced Assess





  ** Anterior Neck


Dressing Type: Gauze


Dressing Description: Clean/Dry, Intact


Exudate Amount: Minimal


Exudate Color: Yellow


Exudate Characteristic(s): Mucous


Integumentary Issue Intervention: Dressing Changed


Josette Wound Tissue: Intact


Josette Wound Swelling: None


Wound Bed Color: Red


Wound Bed Constitution: Granulation Tissue


Wound Edges: Epithelizing


Site Measurement - Head-to-Toe Length X Width X Depth (cm): 0.8cmx0.6cmx0.1cm


Pressure Injury Stage: Stage 3, Medical Device Related Pressure Injury (from 

Trach)


Skin Integrity Problem Comment: Trach removed this afternoon. Immediately 

distal to stoma, there is a healing full-thickness pressure injury w/ 100% 

granulation tissue, no apparent necrosis, and no swelling. Josette-wound skin is 

intact. Changed dressing to silicone foam dressing, which will provide moist 

healing environment for wound. Wound care orders updated. Staff HANANE Ariza present 

to visualize site.

## 2018-01-26 NOTE — ASMTCMCOM
CM Note

 

CM Note                       

Notes:

Spoke with patient who states she wants to see if she can go for SNF rehab at d/c. Sent 3 referrals 


to her first choices, [facility], [facility], and [facility]. Patient will get her trach out 

completely today but she will need a bipap for home use. PASSR completed. Patient feels she must 

get stronger and worries she will be too much for her daughters to manage at home right now. CM 

will follow.

 

Date Signed:  01/26/2018 11:33 AM

Electronically Signed By:Evelyne Castaneda LCSW

## 2018-01-26 NOTE — PDINTPN
Intensivist Progress Note


Assessment/Plan: 


Assessment/plan:








77 F known to me from initial hospitalization when she was admitted for MVR redo

, TV annuloplasty and Maze procedure, complicated by recurrent hypercarbic 

respiratory failure and requiring trach placed 12/2017. Also with initial 

periods of PAF and possible PNA now much more stable from these fronts. Known 

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 

obscuring most of LLL. Known nocturnal hypoxemia preop, but AHI only 4.8 on PSG 

11/2016. 





* Acute Respiratory failure with hypoxia and hypercapnia likely 2/2 advanced age

, weakness with element of diaphragmatic dysfunction and multiple failed 

extubations. She has continued to remain stable with a capped trach since 

monday. Decannulate today with possible dc to SNF/rehab by sunday or monday


* S/P MVR redo/TVA and stable from this perspective. Remains on coumadin and 

pharmacy to dose


* Afib - controlled on metoprolol


* HTN- persistently high so added norvasc 5 mg this 1/25- improved


* Anemia- postop- no recent transfusions


* 


01/26/18 13:09





Subjective: 





Continues to tolerate bipap and slept well


Objective: 





 Vital Signs











Temp Pulse Resp BP Pulse Ox


 


 36.8 C   78   23 H  146/98 H  99 


 


 01/26/18 11:49  01/26/18 11:49  01/26/18 11:49  01/26/18 11:49  01/26/18 11:49








 Laboratory Results





 01/15/18 06:05 





 01/21/18 06:15 





 











 01/25/18 01/26/18 01/27/18





 05:59 05:59 05:59


 


Intake Total 1000 2000 


 


Output Total 1500 750 


 


Balance -500 1250 








 











PT  28.3 SEC (12.0-15.0)  H  01/25/18  04:10    


 


INR  2.66  (0.83-1.16)  H  01/25/18  04:10    














Physical Exam





- Physical Exam


General Appearance: alert, no apparent distress


EENT: PERRL/EOMI


Neck: supple


Respiratory: lungs clear, normal breath sounds, No respiratory distress


Cardiac/Chest: regular rate, rhythm, No edema


Abdomen: non-tender, soft, No distended


Skin: normal color, warm/dry, No cyanosis


Lymphatic: no adenopathy


Extremities: No pedal edema


Neuro/Psych: alert, normal mood/affect, oriented x 3





ICD10 Worksheet


Patient Problems: 


 Problems











Problem Status Onset


 


Acute blood loss anemia Acute  


 


Coagulopathy Acute  


 


S/P Maze operation for atrial fibrillation Acute  ~12/20/17


 


S/P mitral valve replacement with bioprosthetic valve Acute  ~12/20/17


 


S/P tricuspid valve repair Acute  ~12/20/17


 


Secondary tricuspid regurgitation Acute  


 


Severe mitral regurgitation Acute  


 


s/p placement LV epicardial lead Acute  ~12/20/17


 


Ascending aorta enlargement Chronic  


 


Atrial enlargement, bilateral Chronic  


 


Chronic anticoagulation Chronic  


 


Chronic diastolic congestive heart failure Chronic  


 


HTN (hypertension) Chronic  


 


Paroxysmal atrial fibrillation Chronic  


 


Pulmonary hypertension Chronic

## 2018-01-27 NOTE — PDINTPN
Intensivist Progress Note


Assessment/Plan: 


Assessment/plan:








77 F known to me from initial hospitalization when she was admitted for MVR redo

, TV annuloplasty and Maze procedure, complicated by recurrent hypercarbic 

respiratory failure and requiring trach placed 12/2017. Also with initial 

periods of PAF and possible PNA now much more stable from these fronts. Known 

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 

obscuring most of LLL. Known nocturnal hypoxemia preop, but AHI only 4.8 on PSG 

11/2016. 





* Acute Respiratory failure with hypoxia and hypercapnia likely 2/2 advanced age

, weakness with element of diaphragmatic dysfunction and multiple failed 

extubations. She has continued to remain stable with a capped trach since 1/22. 

Decannulated without complication 1/26. Checnged mode/device today so should 

sleep well. Would continue to avoid narcotics, benzos, and sleep aids to 

minimize risk, even if small, of increased CO2. 


* S/P MVR redo/TVA and stable from this perspective. Remains on coumadin and 

pharmacy to dose


* Afib - controlled on metoprolol and in NSR. 


* HTN- persistently high so added norvasc 5 mg this 1/25- improved


* Anemia- postop- no recent transfusions


* Dispo: likely dc in am or monday


* 





Subjective: 





some difficulty with home bipap device, but mode was incorrect. other wise no 

events


Objective: 





 Vital Signs











Temp Pulse Resp BP Pulse Ox


 


 36.6 C   84   18   158/68 H  91 L


 


 01/27/18 16:24  01/27/18 16:24  01/27/18 16:24  01/27/18 16:24  01/27/18 16:24








 Laboratory Results





 01/27/18 09:34 





 01/27/18 09:34 





 











 01/26/18 01/27/18 01/28/18





 05:59 05:59 05:59


 


Intake Total 2000 500 


 


Output Total 750  


 


Balance 1250 500 








 











PT  26.5 SEC (12.0-15.0)  H  01/27/18  05:45    


 


INR  2.44  (0.83-1.16)  H  01/27/18  05:45    














Physical Exam





- Physical Exam


General Appearance: WD/WN, alert, no apparent distress, thin


EENT: PERRL/EOMI, other (trach site clean and dry)


Neck: supple


Respiratory: lungs clear, normal breath sounds, No respiratory distress, No 

accessory muscle use


Cardiac/Chest: regular rate, rhythm, No edema


Abdomen: non-tender, soft, No distended


Skin: normal color, warm/dry, No cyanosis


Lymphatic: no adenopathy


Extremities: No pedal edema


Neuro/Psych: alert, normal mood/affect, oriented x 3





ICD10 Worksheet


Patient Problems: 


 Problems











Problem Status Onset


 


Acute blood loss anemia Acute  


 


Coagulopathy Acute  


 


S/P Maze operation for atrial fibrillation Acute  ~12/20/17


 


S/P mitral valve replacement with bioprosthetic valve Acute  ~12/20/17


 


S/P tricuspid valve repair Acute  ~12/20/17


 


Secondary tricuspid regurgitation Acute  


 


Severe mitral regurgitation Acute  


 


s/p placement LV epicardial lead Acute  ~12/20/17


 


Ascending aorta enlargement Chronic  


 


Atrial enlargement, bilateral Chronic  


 


Chronic anticoagulation Chronic  


 


Chronic diastolic congestive heart failure Chronic  


 


HTN (hypertension) Chronic  


 


Paroxysmal atrial fibrillation Chronic  


 


Pulmonary hypertension Chronic

## 2018-01-27 NOTE — ASMTCMCOM
ANKIT Note

 

CM Note                       

Notes:

CM spoke with pt and her daughter regarding SNF d/c options. [facility] and [facility] 

declined. [facility] requested more information which was sent. Pt said if she ends up discharging 

home instead of to a SNF, she would like homecare, either [facility] or [facility]/OT.

 

Date Signed:  01/27/2018 05:16 PM

Electronically Signed By:ALEXX Santacruz

## 2018-01-28 NOTE — PDIAF
- Diagnosis


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 


Code Status: Full Code





- Medication Management


Discharge Medications: 


 Medications to Continue on Transfer





Zolpidem Tartrate [Ambien 5MG (*)] 5 mg PO HS PRN 10/18/17 [Last Taken 12/17/17]


rOPINIRole HCL [Requip 0.25mg (RX)] 0.25 mg PO HS PRN 10/18/17 [Last Taken 12/17 /17]


Aspirin [Aspirin 81mg (*)] 81 mg PO DAILY  tab.chew 01/28/18 [Last Taken Unknown

]


Levothyroxine [Synthroid 50 mcg (*)] 50 mcg PO DAILY AT 6AM #30 tab 01/28/18 [

Last Taken Unknown]


Losartan Potassium [Cozaar 25 mg (*)] 25 mg PO DAILY #30 tab 01/28/18 [Last 

Taken Unknown]


Metoprolol Tartrate [Lopressor 50 mg (*)] 50 mg PO BID #60 tab 01/28/18 [Last 

Taken Unknown]


Warfarin Sodium [Coumadin 1MG (*)] 1 mg PO DAILY16 #30 tab 01/28/18 [Last Taken 

Unknown]


amLODIPine BESYLATE [Norvasc 10 mg (*)] 10 mg PO DAILY #30 tab 01/28/18 [Last 

Taken Unknown]


medroxyPROGESTERone [Provera 10 mg (*)] 10 mg PO TID #30 tab 01/28/18 [Last 

Taken Unknown]








Discharge Medications: Refer to the Discharge Home Medication list for PRN 

reason.


PICC Care - Routine: Yes (RUE)





- Orders


Services needed: Home Care, Registered Nurse, Physical Therapy, Occupational 

Therapy, Speech Language Pathologist


Home Care Face to Face: I certify that this patient was under my care and that 

I had the required face-to-face encounter meeting the encounter requirements on 

the discharge day.  My findings support the fact that the patient is homebound 

as defined in


Home Care Face to Face Continued: CMS Chapter 7 Medicare Benefits Manual 30.1.1

, The condition of the patient is such that there exists a normal inability to 

leave home and consequently, leaving home would require a considerable and 

taxing effort.


Isolation Type: None


Oxygen: titrate to SpO2 > 90%


Diet Recommendation: fluid restriction (use comment for amount)


Diet Texture: Regular Texture Diet, Thin Liquids, Meds Whole w/Liquids, Meds 

Whole in Puree


Weigh Patient: daily


Boyd: Not applicable


Wound Care Instructions: Ant neck and left gluteal DTI: see progress notes 

wound care nurse.  Sternal incision and chest tube sites: daily soap and water, 

ok to leave open to air, avoid under water immersion until scabs off, avoid 

ointments until scabs off


Activity/Weight Bearing Restrictions: Sternal precautions x 2 more weeks - 

Avoid lifting > 10 lbs with an outstretched arm, avoid push pull activities


Additional: Call [facility] for overnight weight gain > 2 lbs, progressive 

low leg edema, resting HR < 56 or > 120, SBP consistently < 90 or > 150, or any 

surgical wound concerns





- Labs/Radiology


PT/INR Date: 01/31/18 (send to Dr. Means)





- Follow Up Care


Current Providers and Referrals: 


Roseann Bautista MD [Primary Care Provider] - 


Brandi Means MD [Medical Doctor] - follow up as scheduled (within 2 wks 

discharge from LTAC)


Fausto Anglin DO [Doctor of Osteopathy] - 


Brian Weber MD [Medical Doctor] - follow up in 2 weeks

## 2018-01-28 NOTE — ASDISCHSUM
----------------------------------------------

Discharge Information

----------------------------------------------

Plan Status:Home with Home Health                    Medically Cleared to Leave:2018

Discharge Date:2018 02:32 PM                   CM D/C Disposition:Home Health Service

ADT D/C Disposition:Home Health Service              Projected Discharge Date:2018 04:00 PM

Transportation at D/C:ALS/BLS                        Discharge Delay Reason:

Follow-Up Date:2018 04:00 PM                   Discharge Slot:

Final Diagnosis:MVR, TVR, Maze, Respiratory Failure

----------------------------------------------

Placement Information

----------------------------------------------

Referral Type:[facility]          Referral ID:[id number]

Provider Name:

Address 1:                                           Phone Number:

Address 2:                                           Fax Number:

City:                                                Selection Factors:

State:

 

Referral Type:Long Term Acute [facility]          Referral ID:[id number]

Provider Name:

Address 1:                                           Phone Number:

Address 2:                                           Fax Number:

City:                                                Selection Factors:

State:

 

Referral Type:*Nursing Home/SNF                      Referral ID:[id number]

Provider Name:

Address 1:                                           Phone Number:

Address 2:                                           Fax Number:

City:                                                Selection Factors:

State:

 

Referral Type:*Home Health Care Services             Referral ID:[id number]

Provider Name:[facility]

[address]                  Phone [phone]

Address 2:                                           Fax [phone]

City:[place]                                         Selection Factors:

State:[place]

 

----------------------------------------------

Patient Contact Information

----------------------------------------------

Contact Name:SAMANTHA                             Relationship:Daughter

Address:[address]                                   Home [phone]

                                                     Work Phone:

[facility]:[place]                                      Alternate Phone:

[facility]/Zip Code:[place] [address]                              Email:

----------------------------------------------

Financial Information

----------------------------------------------

Financial Class:Medicare Advantage Plans

Primary Plan Desc:[employer] ADVANTAGE PLANS         Primary Plan Number:[id number]

Secondary Plan Desc:                                 Secondary Plan Number:

 

 

----------------------------------------------

Assessment Information

----------------------------------------------

----------------------------------------------

[facility] CM Progress Note

----------------------------------------------

CM Note

 

CM Note                       

Notes:

Patient is POD #1 MVR with Dr Anglin. She is intubated and sedated in the ICU.



I spoke with patient's boyfriend, grandaughter, and son. They informed me that patient is normally 

very independent, lives alone, and is active in the community. Her hope before surgery was to 

return home with family support. We discussed the possible discharge plans of home 

independent, home with homecare, and SNF. The family is open to what we recommend and realize that 

nothing will be determined in these immediate days following surgery. I gave them a list of SNF to 

consider.



Current CM Discharge plan: TBD

 

Date Signed:  2017 03:17 PM

Electronically Signed By:Annie Wagoner RN

 

 

----------------------------------------------

[facility] CM Progress Note

----------------------------------------------

CM Note

 

CM Note                       

Notes:

Awaiting therapy evaluation recommendations. D/C needs TBD. CM following.

 

Date Signed:  2017 03:26 PM

Electronically Signed By:Evelyne Castaneda LCSW

 

 

----------------------------------------------

[facility] CM Progress Note

----------------------------------------------

CM Note

 

CM Note                       

Notes:

Patient's family contacted by  for "Family Meeting" unable to meet today but could meet at 

12:00 on Thursday. Daughter reports that her brother, Javon is her [place] [phone], but 

will be out-of-town. She reports that her mother is highly motivated. Patient up walking with 

vent. PT recommending SNF; OT-HC. CM to follow for discharge needs.

 

Date Signed:  2017 03:43 PM

Electronically Signed By:Poonam Dawkins LCSW

 

 

----------------------------------------------

[facility] Progress Note

----------------------------------------------

CM Note

 

CM Note                       

Notes:

Met with daughter for family meeting with Mayra Kelsey ICU manager and Can from spiritual 

services.. Her daughter is happy with the care so far. She reports they recently lost her 

father. Her mother's surgery was planned and she reports her mother is very motivated to get 

well.  She is still on ventilator. She is to move back to [place] to  her fiance this 

summer. Therapies suggesting inpatient rehab. Order pending. CM to follow.

 

Date Signed:  2017 02:54 PM

Electronically Signed By:Karen Jose RN

 

 

----------------------------------------------

[facility] CM Progress Note

----------------------------------------------

CM Note

 

CM Note                       

Notes:

Patient continues on the vent, trach. Therapies recommending In-pt Rehab.

 

Date Signed:  2017 05:18 PM

Electronically Signed By:Poonam Dawkins LCSW

 

 

----------------------------------------------

[facility] CM Progress Note

----------------------------------------------

CM Note

 

CM Note                       

Notes:

"Family Meeting" today see Spiritual Care Notes. Family has had a very difficult 

time. Daughters' father  during Cheryl's hospitalization so have been under a great deal of 

emotional and physical stress. They are all medical people and are either working or retired from 

hospital settings. They feel that patient is going above and beyond her capabilities, up and 

ambulating on a vent. The are concerned that patient isn't getting enough rest-hospital staff in 

and out of room during day and night and it's hard for her to get any consistent sleep. Family 

would like a call from Pt Rep.

 

Date Signed:  2018 05:25 PM

Electronically Signed By:Poonam Dawkins LCSW

 

 

----------------------------------------------

[facility] Progress Note

----------------------------------------------

CM Note

 

ANKIT Note                       

Notes:

A follow up meeting was scheduled for patient's family with Dr. Anglin today, however it was 

cancelled due to family having scheduling conflicts.Chaplain Carleen will reschedule the 


meeting. Spoke with Jen from inpatient rehab who says patient can still participate in their 

program with a trach but has to be weaned off the vent. The family has been concerned about 

activity level of patient per Jen. CM will address this in the upcoming family meeting. There 

is one SNF rehab program that will take a trach, [facility]. Depending on the family 

meeting and what they choose, we will pursue this if needed. LTAC is the only plan B choice if 

[facility] cannot take the patient and she is not able to participate in an inpatient rehab 

program. CM will follow.

 

Date Signed:  2018 03:57 PM

Electronically Signed By:Evelyne Castaneda LCSW

 

 

----------------------------------------------

[facility] Progress Note

----------------------------------------------

ANKIT Note

 

ANKIT Note                       

Notes:

Family meeting was scheduled by [facility] care for today at 7:30 AM. CM was not in attendance 

because we were not informed of the meeting which was scheduled to accomodate Dr. Anglin's 

schedule. Meeting is documented in the notes. Patient is expected to need care until next week 

sometime. Inpatient rehab continues to monitor her progress. The d/c plan remains [facility] inpatient 

rehab if patient is able. CM will follow. 

 

Date Signed:  2018 02:46 PM

Electronically Signed By:Evelyne Castaneda LCSW

 

 

----------------------------------------------

[facility] CM Progress Note

----------------------------------------------

CM Note

 

CM Note                       

Notes:

Patient continues to have respiratory issues and needs bipap at night. Talked about LTAC in ICU 

Rounds. Met with patient and her daughter Sarah and discussed the LTAC's they would like a referral 


sent to [facility]. Referral sent.

 

Date Signed:  2018 03:27 PM

Electronically Signed By:Poonam Dawkins LCSW

 

 

----------------------------------------------

[facility] CM Progress Note

----------------------------------------------

CM Note

 

CM Note                       

Notes:

[facility] waiting for auth from [employer].

 

Date Signed:  2018 02:44 PM

Electronically Signed By:Poonam Dawkins LCSW

 

 

----------------------------------------------

[facility] CM Progress Note

----------------------------------------------

CM Note

 

CM Note                       

Notes:

Spoke with [facility] who states they are waiting on insurance auth for 

patientYrn Whitfield in admissions ([phone]) states she will call me when they get it. Spoke with 

Delmy also who requested updates on patient's progress. Updates were faxed to her this 

morning. Called admissions again this afternoon to check again but the insurance company has still 

not responded. CM will follow.

 

Date Signed:  01/10/2018 03:26 PM

Electronically Signed By:Evelyne Castaneda LCSW

 

 

----------------------------------------------

[facility] Progress Note

----------------------------------------------

CM Note

 

ANKIT Note                       

Notes:

Faxed updates to Delmy at [facility] ([phone]) who will use them to work on the 

insurance auth. Delmy called back to say the insurance had denied patient LTAC and wants the 

hospital to keep her until she can go to SNF rehab. Initiated an appeal and gave Delmy Mane's 

number so she can set up a DR. samanta JARAMILLO conference and possibly get the decision overturned. They 

have not called Dr. Mane as of 3:30 this afternoon. CM will continue to follow.

 

Date Signed:  2018 03:39 PM

Electronically Signed By:Evelyne Castaneda LCSW

 

 

----------------------------------------------

[facility] Progress Note

----------------------------------------------

CM Note

 

ANKIT Note                       

Notes:

Call placed to Delmy at [facility] to inquire about whether the DR samanta Richardson conference had been set up as of 

yet and if they can do weekend admissions. No word on the DR to Dr, in process and they do accept 

weekend admissions, CM to follow.

 

Date Signed:  2018 04:07 PM

Electronically Signed By:Karen Jose RN

 

 

----------------------------------------------

[facility] CM Progress Note

----------------------------------------------

CM Note

 

CM Note                       

Notes:

Intensivist given the phone# [phone] for [facility] Peer to Peer consult for LTAC need. [employer] 


not available today due to [place]. Will try again Tuesday.

 

Date Signed:  01/15/2018 04:08 PM

Electronically Signed By:Poonam Dawkins LCSW

 

 

----------------------------------------------

[facility] CM Progress Note

----------------------------------------------

CM Note

 

CM Note                       

Notes:

Dr. Lee contacted Dr. Yomi Figueroa [employer] [phone] today to appeal their decision to 

deny LTAC. Dr. Figueroa reports that patient does not meet their criteria for LTAC and will need to 

stay in our acute care until she is ready for SNF level of care.

 

Date Signed:  2018 04:01 PM

Electronically Signed By:Poonam Dawkins LCSW

 

 

----------------------------------------------

[facility] CM Progress Note

----------------------------------------------

CM Note

 

CM Note                       

Notes:

Left a message for patient's daughter Sarah to discuss options in light of the recent denials for 

rehab care for patient from [employer]. CM continues to follow.

 

Date Signed:  2018 03:46 PM

Electronically Signed By:Evelyne Castaneda LCSW

 

 

----------------------------------------------

[facility] CM Progress Note

----------------------------------------------

CM Note

 

CM Note                       

Notes:

Therapy would like to recommend In-pt Rehab. [facility] needed more care after discharge she should go to a SNF 

Rehab. 

 

Date Signed:  2018 03:31 PM

Electronically Signed By:Poonam Dawkins LCSW

 

 

----------------------------------------------

[facility] CM Progress Note

----------------------------------------------

CM Note

 

CM Note                       

Notes:

Spoke with patient who states she wants to see if she can go for SNF rehab at d/c. Sent 3 referrals 


to her first choices, [facility], [facility], and [facility]. Patient will get her trach out 

completely today but she will need a bipap for home use. PASSR completed. Patient feels she must 

get stronger and worries she will be too much for her daughters to manage at home right now. CM 

will follow.

 

Date Signed:  2018 11:33 AM

Electronically Signed By:Evelyne Castaneda LCSW

 

 

----------------------------------------------

[facility] CM Progress Note

----------------------------------------------

CM Note

 

CM Note                       

Notes:

CM spoke with pt and her daughter regarding SNF d/c options. [facility] 

declined. [facility] requested more information which was sent. Pt said if she ends up discharging 

home instead of to a SNF, she would like homecare, either [facility] or [facility].

 

Date Signed:  2018 05:16 PM

Electronically Signed By:ALEXX Santacruz

 

 

----------------------------------------------

Case Management Discharge Plan Note

----------------------------------------------

Case Management Discharge

 

Discharge Order Complete?     Answers:  Yes                                   

Patient to Obtain             Answers:  via Family                            

Medications                                                                   

Transportation Arranged       Answers:  Family/Friends                        

Transport will Pick (Date     2018 03:00 PM

& Time)                       

Faxed Final Orders            Answers:  Yes                           Notes:  [facility]

Agency/Facility Transfer      Answers:  No                                    

Report Printed & Faxed to                                                     

Receiving Agency                                                              

Family Notified               Answers:  Yes                           Notes:  Daughter to transport

Discharge Comments            

Notes:

Patient has been discharged. Lft mess for [facility] x2. [facility] and [facility] 

denied. Asked  to page [facility] RN, waiting-sent a referral to [facility]. Daughter getting equipment 


ready for patient at home. Intensivist to write orders. Faxed the [facility] form and Med list to 

[facility].

 

Date Signed:  2018 03:03 PM

Electronically Signed By:Poonam Dawkins LCSW

 

 

----------------------------------------------

Intervention Information

----------------------------------------------

## 2018-01-28 NOTE — PDINTPN
Intensivist Progress Note


Assessment/Plan: 


Assessment/plan:








77 F known to me from initial hospitalization when she was admitted for MVR redo

, TV annuloplasty and Maze procedure, complicated by recurrent hypercarbic 

respiratory failure and requiring trach placed 12/2017. Also with initial 

periods of PAF and possible PNA now much more stable from these fronts. Known 

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 

obscuring most of LLL. Known nocturnal hypoxemia preop, but AHI only 4.8 on PSG 

11/2016. 





* Acute Respiratory failure with hypoxia and hypercapnia likely 2/2 advanced age

, weakness with element of diaphragmatic dysfunction and multiple failed 

extubations. She has continued to remain stable with a capped trach since 1/22. 

Decannulated without complication 1/26. Changed mode/device 1/27 so should 

sleep well. Would continue to avoid narcotics, benzos, and sleep aids to 

minimize risk, even if small, of increased CO2. Stable


* S/P MVR redo/TVA and stable from this perspective. Remains on coumadin and 

pharmacy to dose


* Afib - controlled on metoprolol and in NSR. 


* HTN- persistently high so added norvasc 5 mg then dc on 10 /day


* Anemia- postop- no recent transfusions


* Dispo: dc home with home care today





01/28/18 14:18





Subjective: 





No events. Tolerated bipap very well using home device


Objective: 





 Vital Signs











Temp Pulse Resp BP Pulse Ox


 


 36.6 C   84   14   152/79 H  90 L


 


 01/27/18 16:24  01/28/18 04:00  01/28/18 04:00  01/28/18 04:00  01/28/18 11:20








 Laboratory Results





 01/27/18 09:34 





 01/27/18 09:34 





 











 01/27/18 01/28/18 01/29/18





 05:59 05:59 05:59


 


Intake Total 500 950 


 


Balance 500 950 








 











PT  26.5 SEC (12.0-15.0)  H  01/27/18  05:45    


 


INR  2.44  (0.83-1.16)  H  01/27/18  05:45    














Physical Exam





- Physical Exam


General Appearance: alert, no apparent distress


EENT: PERRL/EOMI


Neck: supple, other (trach site clean)


Respiratory: lungs clear, normal breath sounds, No respiratory distress, No 

accessory muscle use


Cardiac/Chest: regular rate, rhythm, No edema


Abdomen: non-tender, soft, No distended


Skin: normal color, warm/dry, No cyanosis


Lymphatic: no adenopathy


Extremities: No pedal edema


Neuro/Psych: alert, normal mood/affect, oriented x 3





ICD10 Worksheet


Patient Problems: 


 Problems











Problem Status Onset


 


Acute blood loss anemia Acute  


 


Coagulopathy Acute  


 


S/P Maze operation for atrial fibrillation Acute  ~12/20/17


 


S/P mitral valve replacement with bioprosthetic valve Acute  ~12/20/17


 


S/P tricuspid valve repair Acute  ~12/20/17


 


Secondary tricuspid regurgitation Acute  


 


Severe mitral regurgitation Acute  


 


s/p placement LV epicardial lead Acute  ~12/20/17


 


Ascending aorta enlargement Chronic  


 


Atrial enlargement, bilateral Chronic  


 


Chronic anticoagulation Chronic  


 


Chronic diastolic congestive heart failure Chronic  


 


HTN (hypertension) Chronic  


 


Paroxysmal atrial fibrillation Chronic  


 


Pulmonary hypertension Chronic

## 2018-02-20 NOTE — CPEKG
Heart Rate: 85

RR Interval: 706

P-R Interval: 176

QRSD Interval: 124

QT Interval: 420

QTC Interval: 500

P Axis: 74

QRS Axis: 109

T Wave Axis: 15

EKG Severity - ABNORMAL ECG -

EKG Impression: SINUS RHYTHM



EKG Impression: PROBABLE LEFT ATRIAL ABNORMALITY

EKG Impression: RIGHT BUNDLE BRANCH BLOCK

EKG Impression: LATERAL INFARCT, OLD

EKG Impression: PROBABLE ANTEROSEPTAL INFARCT, AGE INDETERM

Electronically Signed By: Filipe Martinez 20-Feb-2018 19:18:04

## 2018-02-20 NOTE — PDCARPN
Cardiology Progress Note


Assessment/Plan: 


Assessment/plan:  76 yo F with PAF, PPM; PHTN s/p MVR (re-do) in 12/2017.  

Prolonged hospital stay complicated by intermittent hypercarbic respiratory 

failure requiring tracheostomy.  Had been doing fairly well at home until a 

week ago when she developed LE edema, SOB, new O2 requirement.  Outpatient 

chest CT with no PE and only small pleural effusion.  Some pulmonary edema.  

Started on lasix without clinical improvement.  Admitted from cardiology clinic 

2/20 with left diastolic and right heart failure. 





1.  CHF: acute diastolic and right systolic:  IV lasix, electrolyte repletion.  

CXR.  Echo last week with normally functioning MVR





2.  Hypoxia: related to CHF.  Repeat CXR but doubt enough pleural fluid for 

thoracentesis.  Does not appear to be infectious. 





3.  PAF: regular on exam.  COntinue warfarin and BB





4.  Hx of hypercarbic resp failure:  uses biPAP at home at night.  





Admitted to inpatient status for CHF

















02/20/18 17:49





Subjective: 





SOB, LE edema.  No CP, syncope, palptiations. 


Objective: 





 Vital Signs (8 Hrs)











  Temp Pulse Resp BP Pulse Ox


 


 02/20/18 17:19  36.6 C  90  18  134/72 H  93








 Intake/Output (24 Hrs)











 02/19/18 02/20/18 02/21/18





 05:59 05:59 05:59


 


Other:   


 


  Weight   55.4 kg








see today's office note





ICD10 Worksheet


Patient Problems: 


 Problems











Problem Status Onset


 


s/p placement LV epicardial lead Acute  ~12/20/17


 


HTN (hypertension) Chronic  


 


Chronic diastolic congestive heart failure Chronic  


 


Coagulopathy Acute  


 


Acute blood loss anemia Acute  


 


S/P Maze operation for atrial fibrillation Acute  ~12/20/17


 


S/P tricuspid valve repair Acute  ~12/20/17


 


S/P mitral valve replacement with bioprosthetic valve Acute  ~12/20/17


 


Chronic anticoagulation Chronic  


 


Paroxysmal atrial fibrillation Chronic  


 


Pulmonary hypertension Chronic  


 


Ascending aorta enlargement Chronic  


 


Atrial enlargement, bilateral Chronic  


 


Secondary tricuspid regurgitation Acute  


 


Severe mitral regurgitation Acute

## 2018-02-21 NOTE — ASMTCASEMG
Living Arrangements

 

What is your living           Answers:  Alone                                 

arrangement? Who do you                                                       

live with?                                                                    

Type Of Residence

 

What kind of residence do     Answers:  House                                 

you live in?                                                                  

Discharge Plan Comments

 

Coordination Status           

Comments                      

Notes:

CM spoke w/ HANANE Burr regarding d/c POC. Pt is a 75 y/o man admitted for heart failure. No 

therapies ordered at this time. Pt will most likely d/c independent when medically stable. CM 

available for changes.







Plan: Independent 

 

Date Signed:  02/21/2018 09:22 AM

Electronically Signed By:BEN Che

## 2018-02-21 NOTE — PDCARPN
Cardiology Progress Note


Assessment/Plan: 


Assessment/plan:  78 yo F with PAF;  PHTN s/p MVR (re-do after failed MV repair

) in 12/2017.  Prolonged hospital stay complicated by intermittent hypercarbic 

respiratory failure requiring tracheostomy.  Had been doing fairly well at home 

until a week ago when she developed LE edema, SOB, new O2 requirement.  

Outpatient chest CT with no PE and only small pleural effusion.  Some pulmonary 

edema.  Started on lasix without clinical improvement.  Admitted from 

cardiology clinic 2/20 with left diastolic and right heart failure. 





1.  CHF: acute diastolic and right systolic:  IV lasix, electrolyte repletion.  

CXR 2/20 shows increasing left pleural effusion. Thoracentesis today.   Echo 

last week with normally functioning MVR





2.  Hypoxia: related to CHF and left pleural effusion.  Does not appear to be 

infectious. 





3.  PAF: currently in NSR.  Continue warfarin and BB





4.  Hx of hypercarbic resp failure:  uses biPAP at home at night.  ABG today.  D

/c progesterone.  Appreciate Dr. Weber's input.  














02/21/18 12:18





Subjective: 





She feels better; less SOB.  No CP or palpitations


Reviewed/Discussed With: other (Dr. Weber)


Objective: 





 Vital Signs (8 Hrs)











  Temp Pulse Resp BP Pulse Ox


 


 02/21/18 11:33  36.7 C  79  16  114/60  96


 


 02/21/18 08:00  36.8 C  92  16  116/65  93








 Intake/Output (24 Hrs)











 02/20/18 02/21/18 02/22/18





 05:59 05:59 05:59


 


Intake Total  820 


 


Output Total  1450 900


 


Balance  -630 -900


 


Intake:   


 


  Oral (ml)  820 


 


Output:   


 


  Urine (ml)  1450 900


 


    Toilet  1450 900


 


Other:   


 


  Weight  54.7 kg 


 


  Intake Quantity  Yes 





  Sufficient   


 


  Number of Voids   


 


    Toilet  2 


 


  Number of Stools   


 


    Toilet  1 








NAD


JVP <10


RRR no m/r/g


Tubular breath sounds left lower 1/3


1+ bilateral ankle edema


Result Diagrams: 


 02/20/18 17:50





 02/21/18 03:12


Cardiac Labs: 





 Cardiac Lab Results (72 Hrs)











  02/20/18





  17:50


 


Troponin I  < 0.012











Telemetry: 





NSR





ICD10 Worksheet


Patient Problems: 


 Problems











Problem Status Onset


 


s/p placement LV epicardial lead Acute  ~12/20/17


 


HTN (hypertension) Chronic  


 


Chronic diastolic congestive heart failure Chronic  


 


Coagulopathy Acute  


 


Acute blood loss anemia Acute  


 


S/P Maze operation for atrial fibrillation Acute  ~12/20/17


 


S/P tricuspid valve repair Acute  ~12/20/17


 


S/P mitral valve replacement with bioprosthetic valve Acute  ~12/20/17


 


Chronic anticoagulation Chronic  


 


Paroxysmal atrial fibrillation Chronic  


 


Pulmonary hypertension Chronic  


 


Ascending aorta enlargement Chronic  


 


Atrial enlargement, bilateral Chronic  


 


Secondary tricuspid regurgitation Acute  


 


Severe mitral regurgitation Acute

## 2018-02-22 NOTE — PDINTPN
Intensivist Progress Note


Assessment/Plan: 


Assessment/plan:








77 F s/p recent prolonged hospitalization following MVR, TV annuloplasty, and 

maze procedure complicated by hypoventilation and respiratory failure. She 

eventually required tracheostomy but was vent dependent until starting provera 

as a respiratory stimulant. She was dc/d home and continued to improve until 

about 1 week PTA. An urgen CT ruled out PE (low PTP) but showed pulmonary edema

, and she had bilateral DALTON as well. She was given low dose lasix, daytime O2,  

and told to dc provera, but continued it anyway. At cardiology fu several days 

later, she showed no improvement so was admitted for more aggressive diuresis. 

She has tolerated that well but the admission CXR showed a growing left pleural 

effusion which was tapped 2/21/18. 





* Hypoxemia- her current issues seem to revolve around fluid overload since on 

echo her MVR is working fine and there is no evidence to support infection. An 

mid-day ABG shows a CO2 of only 45 (upper normal) suggesting that her 

ventilation has improved from previous. Provera has been linked to edema (

occurrence rate unknown) so may have been the culprit. I support ongoing 

diuresis as tolerated. 


* Pleural effusion- she has an non-specific exudate (as expected) without 

evidence of infection. I suspect this is also related to her fluid overload 

status. 


* Hypoventilation- Continue with QHS bipap for now, and will eventually need 

repeat sleep study. She reports excellence tolerance, compliance, and benefit 

since starting. 




















Subjective: 





sleeping when i arrived on bipap


Objective: 





 Vital Signs











Temp Pulse Resp BP Pulse Ox


 


 36.5 C   94   18   109/49 L  80 L


 


 02/22/18 11:25  02/22/18 13:28  02/22/18 13:28  02/22/18 13:28  02/22/18 13:28








 Microbiology











 02/21/18 13:30 Gram Stain - Final





 Thoracic Fluid - Aspirate 








 Laboratory Results





 02/20/18 17:50 





 02/22/18 03:07 





 











 02/21/18 02/22/18 02/23/18





 05:59 05:59 05:59


 


Intake Total 820 1740 


 


Output Total 1450 2900 


 


Balance -630 -1160 








 











PT  17.5 SEC (12.0-15.0)  H  02/22/18  03:07    


 


INR  1.42  (0.83-1.16)  H  02/22/18  03:07    














Physical Exam





- Physical Exam


General Appearance: no apparent distress, thin


EENT: PERRL/EOMI


Neck: supple


Respiratory: lungs clear, decreased breath sounds, No respiratory distress, No 

accessory muscle use


Cardiac/Chest: regular rate, rhythm, No edema


Abdomen: non-tender, soft, No distended


Skin: normal color, warm/dry, No cyanosis


Lymphatic: no adenopathy


Extremities: No pedal edema


Neuro/Psych: alert, normal mood/affect, oriented x 3





ICD10 Worksheet


Patient Problems: 


 Problems











Problem Status Onset


 


Acute blood loss anemia Acute  


 


Coagulopathy Acute  


 


S/P Maze operation for atrial fibrillation Acute  ~12/20/17


 


S/P mitral valve replacement with bioprosthetic valve Acute  ~12/20/17


 


S/P tricuspid valve repair Acute  ~12/20/17


 


Secondary tricuspid regurgitation Acute  


 


Severe mitral regurgitation Acute  


 


s/p placement LV epicardial lead Acute  ~12/20/17


 


Ascending aorta enlargement Chronic  


 


Atrial enlargement, bilateral Chronic  


 


Chronic anticoagulation Chronic  


 


Chronic diastolic congestive heart failure Chronic  


 


HTN (hypertension) Chronic  


 


Paroxysmal atrial fibrillation Chronic  


 


Pulmonary hypertension Chronic

## 2018-02-22 NOTE — PDCARPN
Cardiology Progress Note


Assessment/Plan: 


Assessment/plan:  76 yo F with PAF;  PHTN;  s/p MVR (re-do after failed MV 

repair) in 12/2017.  Prolonged hospital stay after CT surgery complicated by 

intermittent hypercarbic respiratory failure requiring tracheostomy.  Had been 

doing fairly well at home until a week ago when she developed LE edema, SOB, 

new O2 requirement.  Outpatient chest CT with no PE and only small pleural 

effusion.  Some pulmonary edema.  Started on lasix without clinical 

improvement.  Admitted from cardiology clinic 2/20 with left diastolic and 

right heart failure. 





1.  CHF: acute diastolic and right systolic:  IV lasix, electrolyte repletion.  

Transition to oral lasix tomorrow.  CXR 2/20 shows increasing left pleural 

effusion. Thoracentesis 2/21 with 1150 cc tapped.   Weight down 2 kg since 

admission.  Echo last week with normally functioning MVR





2.  Hypoxia: related to CHF and left pleural effusion. Improving Does not 

appear to be infectious. 





3.  PAF: currently in NSR.  Continue warfarin and BB





4.  Hx of hypercarbic resp failure:  uses biPAP at night.  ABG with mildly 

elevated CO2..  Appreciate Dr. Weber's input.  





5.  HTN: slightly low.  Decrease amlodipine.  Also decrease amlodipine as this 

may be contributing to peripheral edema.





May be stable for discharge tomorrow.














02/22/18 09:56





Subjective: 





She reports that her breathing feels better.  Lower extremity edema slightly 

better.  She is mildly lightheaded.


Objective: 





 Vital Signs (8 Hrs)











  Temp Pulse Resp BP Pulse Ox


 


 02/22/18 07:53  36.8 C  94  18  100/47 L  98


 


 02/22/18 04:00  36.8 C  89  16  122/63 H  93








 Intake/Output (24 Hrs)











 02/21/18 02/22/18 02/23/18





 05:59 05:59 05:59


 


Intake Total 820 1740 


 


Output Total 1450 2900 


 


Balance -630 -1160 


 


Intake:   


 


  Oral (ml) 820 1740 


 


Output:   


 


  Urine (ml) 1450 1750 


 


    Toilet 1450 1750 


 


  Thoracentesis  1150 


 


Other:   


 


  Weight 54.7 kg 53.4 kg 


 


  Intake Quantity Yes  





  Sufficient   


 


  Number of Voids   


 


    Toilet 2 2 


 


  Number of Stools   


 


    Toilet 1  








No acute distress.


JVP less than 10.  Regular rate and rhythm with soft early systolic murmur at 

the left lower sternal border.  No rub or gallop.


Lungs improved aeration of the left base.  No wheezes or rales.


Improved but still present  1+ edema bilaterally.


Result Diagrams: 


 02/20/18 17:50





 02/22/18 03:07


Cardiac Labs: 





 Cardiac Lab Results (72 Hrs)











  02/20/18





  17:50


 


Troponin I  < 0.012











Telemetry: 





Normal sinus rhythm





ICD10 Worksheet


Patient Problems: 


 Problems











Problem Status Onset


 


s/p placement LV epicardial lead Acute  ~12/20/17


 


HTN (hypertension) Chronic  


 


Chronic diastolic congestive heart failure Chronic  


 


Coagulopathy Acute  


 


Acute blood loss anemia Acute  


 


S/P Maze operation for atrial fibrillation Acute  ~12/20/17


 


S/P tricuspid valve repair Acute  ~12/20/17


 


S/P mitral valve replacement with bioprosthetic valve Acute  ~12/20/17


 


Chronic anticoagulation Chronic  


 


Paroxysmal atrial fibrillation Chronic  


 


Pulmonary hypertension Chronic  


 


Ascending aorta enlargement Chronic  


 


Atrial enlargement, bilateral Chronic  


 


Secondary tricuspid regurgitation Acute  


 


Severe mitral regurgitation Acute

## 2018-02-22 NOTE — ASMTCMCOM
CM Note

 

CM Note                       

Notes:

CM spoke w/ HANANE Estevez regarding d/c POC. CM met w/ pt for dispo planning. Pt would like to 

continue w/ [facility], RN. Pt reports that her HC nurse is Olivia Shoemaker. CM called [facility] and notified 

them of the anticipated d/c for tomorrow. Updates sent to [facility]. CM to follow.







Plan: [facility], RN

 

Date Signed:  02/22/2018 10:24 AM

Electronically Signed By:BEN Che

## 2018-02-23 NOTE — GDS
[f rep st]



                                                             DISCHARGE SUMMARY





ADMISSION DIAGNOSES:  

1.  Left-sided diastolic and right heart failure.

2.  Left pleural effusion.

3.  Chronic hypoxic respiratory failure.

4.  Hypercarbic respiratory failure, on BiPAP.

5.  Valvular heart disease, status post mitral valve replacement December 2017.

6.  Paroxysmal atrial fibrillation.



DISCHARGE DIAGNOSES:  

1.  Improved diastolic left heart and systolic right heart failure.

2.  Left pleural effusion, status post thoracentesis.

3.  Hypoxic respiratory failure.

4.  Hypercarbic respiratory failure.

5.  Paroxysmal atrial fibrillation, sinus rhythm during this admission.

6.  Valvular heart disease, with stable mitral valve prosthesis function.



PROCEDURES:  During admission:  

1.  Chest x-ray of 02/20/2018:  Progressive left lower lobe consolidation and pleural effusion.

2.  Chest ultrasound demonstrating moderate left pleural effusion.

3.  Ultrasound-guided thoracentesis with Dr. Ramiro Tello.  1150 cc of serosanguineous fluid withdra
wn.

4.  Followup chest x-ray on 02/22/2018, shows no delayed pneumothorax.  Persistent atelectasis and fi
brosis at the mid to lower left lung with a small residual left effusion.

5.  EKG:  Sinus rhythm with PVCs.  Right bundle branch block.



HOSPITAL COURSE:  The patient is a 77-year-old female with valvular heart disease.  Status post stan
l valve replacement, which is a redo after failed mitral valve repair in 2017.  She also had a tricus
pid valve annuloplasty and maze procedure during that admission.  She was admitted from clinic with v
olume overload and hypoxia.  She responded well to IV diuresis and felt better after a left-sided ple
ural effusion.  This is suggestive of transudate without infection in the fluid. 



She was also seen by Dr. Weber of Pulmonary, who knows her from both inpatient and outpatient setting
.  Her progesterone was discontinued.  She had an ABG showing minimally elevated pCO2 at 45. 



The day of discharge, she was slightly hypotensive, so we stopped her amlodipine and changed her Lasi
x back to once daily dosing orally.  We will ensure that she is normotensive and asymptomatic prior t
o discharge.  She denied dyspnea while wearing oxygen and had not had any chest pain.  Lower extremit
y edema is improved.



DISCHARGE PHYSICAL EXAM:  VITAL SIGNS:  Blood pressure 113/56, heart rate 88, oxygen saturation 94% o
n 2 L with her CPAP and oxygen.  She is afebrile.  GENERAL:  Well-appearing, slightly frail older fem
orion in no acute distress.  NECK:  JVP less than 10.  CARDIAC:  Regular rate and rhythm with 2/6 holos
ystolic murmur at the left lower sternal border and at the apex.  LUNGS:  Minimal bibasilar rales.  D
ecreased breath sounds at the left base.  ABDOMEN:  Soft and nontender.  EXTREMITIES:  With improving
 trace to 1+ bilateral ankle edema.



LABORATORY DATA:  CBC dated February 20, white count 6.2, hematocrit 34.7, and platelets are 279.  IN
R the day of discharge is 1.56.  Blood gas, pH 7.44, PO2 of 78, pCO2 of 45, on 2 L of nasal cannula o
xygen.  Sodium 137, potassium 3.7, chloride 98, BUN 14, bicarb 30, creatinine 0.7.  Magnesium 1.9.  A
dmission BNP was 719.  Admission troponin was negative.  LFTs normal.



DISCHARGE INSTRUCTIONS:  

1.  The patient will be discharged home with her previously in place services including home health, 
nursing, and social work.

2.  She will require basic metabolic panel and INR on Monday, February 26, results called to [facility].

3.  Follow up with Dr. Matias Weber in 2 weeks.

4.  Follow up with Dr. Brandi Means in 2 weeks.  

5.  Follow up with Dr. Roseann Bautista in 1-2 weeks.  

6.  She is to wear her oxygen, 2 L per nasal cannula, continuously.  She will continue with previousl
y prescribed BiPAP at night.



DISCHARGE MEDICATIONS:  Please see med reconciliation.  Changes include discontinuation of Provera an
d discontinuation of amlodipine, and an increase in her warfarin.  Her Lasix dose will be 40 mg once 
daily with 20 mg once daily of potassium. 



Greater than 30 minutes was spent in discharge of this patient.





Job #:  [id number]

## 2018-02-23 NOTE — ASMTLACE
LACE

 

Length of stay for            Answers:  3 days                                

current admission                                                             

Acuity / Level of             Answers:  Yes                                   

Care: Did the patient                                                         

have an inpatient                                                             

admission?                                                                    

Comorbidities - select        Answers:  Cerebrovascular disease               

all that apply                          (CVA, TIA, aneurysms, vasc            

                                        ular dementia)                        

                                        Congestive heart failure              

                                        Other                         Notes:  HTN, Hx of MVR, Hx of D
VT

# of Emergency department     Answers:  1-2                                   

visits in the last 6                                                          

months                                                                        

Social determinants           Answers:  Mental health diagnosis               

                                        (anxiety, depression, pers            

                                        onality disorders, etc.)              

Score: 14

 

Date Signed:  02/23/2018 09:37 AM

Electronically Signed By:BEN Che

## 2018-02-23 NOTE — ASDISCHSUM
----------------------------------------------

Discharge Information

----------------------------------------------

Plan Status:Home with Home Health                    Medically Cleared to Leave:02/22/2018

Discharge Date:02/23/2018 12:35 PM                   CM D/C Disposition:

ADT D/C Disposition:Home Health Service              Projected Discharge Date:02/23/2018 11:00 AM

Transportation at D/C:                               Discharge Delay Reason:

Follow-Up Date:02/23/2018 11:00 AM                   Discharge Slot:

Final Diagnosis:

----------------------------------------------

Placement Information

----------------------------------------------

Referral Type:*Home Health Care Services             Referral ID:[id number]

Provider Name:[facility]

Address 1:[address] [place] [address]                  Phone [phone]

Address 2:                                           Fax [phone]

City:[place]                                         Selection Factors:

State:[place]

 

----------------------------------------------

Patient Contact Information

----------------------------------------------

Contact Name:SAMANTHA                             Relationship:Daughter

Address:[address]                                   Home [phone]

                                                     Work Phone:

[facility]:[place]                                      Alternate Phone:

[facility]/Zip Code:[place] [address]                              Email:

----------------------------------------------

Financial Information

----------------------------------------------

Financial Class:Medicare Advantage Plans

Primary Plan Desc:[employer]         Primary Plan Number:[id number]

Secondary Plan Desc:                                 Secondary Plan Number:

 

 

----------------------------------------------

Assessment Information

----------------------------------------------

----------------------------------------------

[facility] Initial CM Assessment

----------------------------------------------

Living Arrangements

 

What is your living           Answers:  Alone                                 

arrangement? Who do you                                                       

live with?                                                                    

Type Of Residence

 

What kind of residence do     Answers:  House                                 

you live in?                                                                  

Discharge Plan Comments

 

Coordination Status           

Comments                      

Notes:

CM spoke w/ HANANE Burr regarding d/c POC. Pt is a 75 y/o man admitted for heart failure. No 

therapies ordered at this time. Pt will most likely d/c independent when medically stable. CM 

available for changes.







Plan: Independent 

 

Date Signed:  02/21/2018 09:22 AM

Electronically Signed By:BEN Che

 

 

----------------------------------------------

LACE

----------------------------------------------

MARILY

 

Length of stay for            Answers:  3 days                                

current admission                                                             

Acuity / Level of             Answers:  Yes                                   

Care: Did the patient                                                         

have an inpatient                                                             

admission?                                                                    

Comorbidities - select        Answers:  Cerebrovascular disease               

all that apply                          (CVA, TIA, aneurysms, vasc            

                                        ular dementia)                        

                                        Congestive heart failure              

                                        Other                         Notes:  HTN, Hx of MVR, Hx of D
VT

# of Emergency department     Answers:  1-2                                   

visits in the last 6                                                          

months                                                                        

Social determinants           Answers:  Mental health diagnosis               

                                        (anxiety, depression, pers            

                                        onality disorders, etc.)              

Score: 14

 

Date Signed:  02/23/2018 09:37 AM

Electronically Signed By:BEN Che

 

 

----------------------------------------------

[facility] Progress Note

----------------------------------------------

CM Note

 

CM Note                       

Notes:

ANKIT spoke w/ HANANE Estevez regarding d/c POC. ANKIT met w/ pt for dispo planning. Pt would like to 

continue w/ [facility], RN. Pt reports that her HC nurse is Olivia Shoemaker. CM called [facility] and notified 

them of the anticipated d/c for tomorrow. Updates sent to [facility].  to follow.







Plan: [facility], RN

 

Date Signed:  02/22/2018 10:24 AM

Electronically Signed By:BEN Che

 

 

----------------------------------------------

Case Management Discharge Plan Note

----------------------------------------------

Case Management Discharge

 

Discharge Order Complete?     Answers:  Yes                                   

Patient to Obtain             Answers:  Independently                         

Medications                                                                   

Transportation Arranged       Answers:  Family/Friends                        

[facility] Complete               Answers:  No                                    

Case Management Transport     Answers:  No                                    

Form Complete                                                                 

Faxed Final Orders            Answers:  Yes                                   

Agency/Facility Transfer      Answers:  Yes                                   

Report Printed & Faxed to                                                     

Receiving Agency                                                              

Family Notified               Answers:  No                                    

Discharge Comments            

Notes:

Pt is being discharged today. ANKIT spoke w/ Dr. Means regarding d/c POC. Pt will d/c with [facility], RN and 


[facility]. ANKIT provided Zenaida dhara/ phone number to give report. CM available for changes.



Plan: [facility], RN and [facility]

 

Date Signed:  02/23/2018 09:36 AM

Electronically Signed By:BEN Che

 

 

----------------------------------------------

Intervention Information

----------------------------------------------

Intervention Type:*IM-Signed                         Date of Service:02/23/2018 09:34 AM

Patient Type:Inpatient                               Staff Member:Mary Candelario

Hours:                                               Discipline:

Severity:                                            Comment:

## 2018-02-23 NOTE — PDINTPN
Intensivist Progress Note


Assessment/Plan: 


Assessment/plan:








77 F s/p recent prolonged hospitalization following MVR, TV annuloplasty, and 

maze procedure complicated by hypoventilation and respiratory failure. She 

eventually required tracheostomy but was vent dependent until starting provera 

as a respiratory stimulant. She was dc/d home and continued to improve until 

about 1 week PTA. An urgen CT ruled out PE (low PTP) but showed pulmonary edema

, and she had bilateral DALTON as well. She was given low dose lasix, daytime O2,  

and told to dc provera, but continued it anyway. At cardiology fu several days 

later, she showed no improvement so was admitted for more aggressive diuresis. 

She has tolerated that well but the admission CXR showed a growing left pleural 

effusion which was tapped 2/21/18. 





* Hypoxemia- her current issues seem to revolve around fluid overload since on 

echo her MVR is working fine and there is no evidence to support infection. An 

mid-day ABG shows a CO2 of only 45 (upper normal) suggesting that her 

ventilation has improved from previous. Provera has been linked to edema (

occurrence rate unknown) so may have been the culprit. She feels well but still 

has a daytime O2 requirement. Agree with dc home today and fu pulmonary 2-3 

weeks from now. 


* Pleural effusion- she has an non-specific exudate (as expected) without 

evidence of infection. I suspect this is also related to her fluid overload 

status. 


* Hypoventilation- Continue with QHS bipap for now, and will eventually need 

repeat sleep study. She reports excellence tolerance, compliance, and benefit 

since starting. 











Subjective: 





no events


Objective: 





 Vital Signs











Temp Pulse Resp BP Pulse Ox


 


 36.6 C   71   15   106/53 L  96 


 


 02/23/18 07:46  02/23/18 11:52  02/23/18 11:52  02/23/18 11:52  02/23/18 11:52








 Microbiology











 02/21/18 13:30 Gram Stain - Final





 Thoracic Fluid - Aspirate 








 Laboratory Results





 02/20/18 17:50 





 02/23/18 07:00 





 











 02/22/18 02/23/18 02/24/18





 05:59 05:59 05:59


 


Intake Total 1740 1350 


 


Output Total 2900 1550 


 


Balance -1160 -200 








 











PT  18.8 SEC (12.0-15.0)  H  02/23/18  07:00    


 


INR  1.56  (0.83-1.16)  H  02/23/18  07:00    














Physical Exam





- Physical Exam


General Appearance: alert, no apparent distress, thin


EENT: PERRL/EOMI


Neck: supple


Respiratory: lungs clear, normal breath sounds, No respiratory distress, No 

accessory muscle use


Cardiac/Chest: regular rate, rhythm, edema (1+ at best)


Abdomen: non-tender, soft, No distended


Skin: normal color, warm/dry, No cyanosis


Lymphatic: no adenopathy


Extremities: No pedal edema


Neuro/Psych: alert, normal mood/affect, oriented x 3





ICD10 Worksheet


Patient Problems: 


 Problems











Problem Status Onset


 


Acute blood loss anemia Acute  


 


Coagulopathy Acute  


 


S/P Maze operation for atrial fibrillation Acute  ~12/20/17


 


S/P mitral valve replacement with bioprosthetic valve Acute  ~12/20/17


 


S/P tricuspid valve repair Acute  ~12/20/17


 


Secondary tricuspid regurgitation Acute  


 


Severe mitral regurgitation Acute  


 


s/p placement LV epicardial lead Acute  ~12/20/17


 


Ascending aorta enlargement Chronic  


 


Atrial enlargement, bilateral Chronic  


 


Chronic anticoagulation Chronic  


 


Chronic diastolic congestive heart failure Chronic  


 


HTN (hypertension) Chronic  


 


Paroxysmal atrial fibrillation Chronic  


 


Pulmonary hypertension Chronic

## 2018-02-23 NOTE — PDIAF
- Diagnosis


Code Status: Full Code





- Medication Management


Discharge Medications: 


 Medications to Continue on Transfer





rOPINIRole HCL [Requip 0.25mg (RX)] 0.25 mg PO HS PRN 10/18/17 [Last Taken 12/17 /17]


Metoprolol Tartrate [Lopressor 50 mg (*)] 50 mg PO BID #60 tab 01/28/18 [Last 

Taken 02/20/18]


Alendronate Sodium [Fosamax 70 MG (*)] 70 mg PO FR@0700 02/20/18 [Last Taken 02/

15/18]


LORazepam [Lorazepam] 0.5 mg PO BID PRN 02/20/18 [Last Taken Unknown]


Levothyroxine [Synthroid 50 mcg (*)] 50 mcg PO HS 02/20/18 [Last Taken 02/19/18]


Losartan Potassium [Cozaar 25 mg (*)] 25 mg PO HS 02/20/18 [Last Taken 02/19/18]


Potassium Chloride [Klor-Con M20] 20 meq PO BID 02/20/18 [Last Taken Unknown]


Acetaminophen [Tylenol 325mg (*)] 650 mg PO Q4HRS PRN  tab 02/23/18 [Last Taken 

Unknown]


Furosemide [Lasix 40 MG (*)] 40 mg PO BID@0900,1500 #60 tab 02/23/18 [Last 

Taken Unknown]


Warfarin Sodium [Coumadin 1MG (*)] 1 mg PO TUTH #0 02/23/18 [Last Taken 02/20/18

]


Warfarin Sodium [Coumadin 1MG (*)] 1.5 mg PO SUMOWEFRSA #0 02/23/18 [Last Taken 

Unknown]


amLODIPine BESYLATE [Amlodipine Besylate] 2.5 mg PO HS #0 02/23/18 [Last Taken 

02/19/18]


amLODIPine BESYLATE [Norvasc 2.5 mg (*)] 2.5 mg PO DAILY  tab 02/23/18 [Last 

Taken Unknown]








Discharge Medications: Refer to the Discharge Home Medication list for PRN 

reason.





- Orders


Services needed: Home Care, Master 


Home Care Face to Face: I certify that this patient was under my care and that 

I had the required face-to-face encounter meeting the encounter requirements on 

the discharge day.  My findings support the fact that the patient is homebound 

as defined in


Home Care Face to Face Continued: CMS Chapter 7 Medicare Benefits Manual 30.1.1

, The condition of the patient is such that there exists a normal inability to 

leave home and consequently, leaving home would require a considerable and 

taxing effort.


Isolation Type: None


Oxygen: 2L supplemental O2 continuousy.  Bipap as previously prescribed


Diet Recommendation: no restrictions on diet


Weigh Patient: daily


Additional: compression socks when awake





- Labs/Radiology


BMP Date: 02/26/18


PT/INR Date: 02/26/18





- Follow Up Care


Current Providers and Referrals: 


Roseann Bautista MD [Primary Care Provider] - 


Brian Weber MD [Medical Doctor] -  (2weeks)


Brandi Means MD [Medical Doctor] -  (10-14 days)

## 2018-03-01 NOTE — PQFORM
PHYSICIAN QUERY FORM

 

 Needs Your Response





This query form is being sent to you to assure this patient record is coded 
properly.  Please respond to the question below:



 QUESTION:

Dr. Means,



   The wound care nurse has an evaluation in this chart for  a sacral decub. 
ulcer, unstagable,  and is indicated it was not

present on admission.  After review of the  Skin integrety notes, done at 
admission, the patient reports she developed  this Pressure

Injury during her last hospital stay and has been having it treated by  a Home 
Health RN, and it is presently healing.  After review, was

this pressure injury Present on Admission?



___________yes



___________no



_______x____Unable to determine



Thank you,



Genevieve Vuong, 

B.SYrn, [facility], CDIP



INSTRUCTIONS FOR RESPONSE:



Answer question by clicking on the "Edit Document" button.

Move cursor to area below the stars.

When complete, hit "Save."

Click on the "Sign" button, then click "Sign" again.

Type in your PIN and hit "Enter."



****************

MTDD

## 2018-03-06 NOTE — CPEKG
Heart Rate: 94

RR Interval: 638

P-R Interval: 184

QRSD Interval: 126

QT Interval: 396

QTC Interval: 496

P Axis: 74

QRS Axis: 122

T Wave Axis: -9

EKG Severity - ABNORMAL ECG -

EKG Impression: SINUS RHYTHM

EKG Impression: VENTRICULAR PREMATURE COMPLEX

EKG Impression: RIGHT BUNDLE BRANCH BLOCK

EKG Impression: PROBABLE ANTEROSEPTAL INFARCT, AGE INDETERM

Electronically Signed By: Brian Aguirre 06-Mar-2018 11:11:07

## 2018-03-06 NOTE — EDPHY
H & P


Time Seen by Provider: 03/06/18 09:11


HPI/ROS: 





CHIEF COMPLAINT:  Lightheadedness, tachycardia, status post cardioversion x2





HISTORY OF PRESENT ILLNESS:  The patient presents to the emergency department 

with lightheadedness and tachycardia.  The patient is several months status 

post open heart surgery for valvular replacement.  This was complicated by 

prolonged ICU stay in intubation.  The patient had been on amiodarone up until 

a week ago which was stopped secondary to low blood pressure.  According to 

paramedics she developed a wide complex fast tachycardia.  She was cardioverted 

twice with return to atrial fibrillation both times.  Patient states that she 

is currently feeling better.  She denies any chest pain or shortness of breath.

  She is currently anticoagulated with warfarin.





REVIEW OF SYSTEMS:


A comprehensive 10 point review of systems is otherwise negative aside from 

elements mentioned in the history of present illness.


Source: Patient, EMS


Exam Limitations: No limitations





- Personal History


Tetanus Vaccine Date: <10 YRS





- Medical/Surgical History


Hx Asthma: No


Hx Chronic Respiratory Disease: No


Hx Diabetes: No


Hx Cardiac Disease: Yes


Hx Renal Disease: No


Hx Cirrhosis: No


Hx Alcoholism: No


Hx HIV/AIDS: No


Hx Splenectomy or Spleen Trauma: No


Other PMH: medical- chf,Mitral valve regurgitation/open heart with a ring, 

Hypothroid 10/13. Gerd, raynauds, DVT, connective tissue disorder.  surgical-

tubal ligation.





- Social History


Smoking Status: Never smoked





- Physical Exam


Exam: 





General Appearance:  Thin female, no acute distress


Eyes:  Pupils equal and round no pallor or injection


ENT, Mouth:  Mucous membranes moist


Respiratory:  There are no retractions, lungs are clear to auscultation


Cardiovascular:  Irregular rate, 2/6 systolic ejection murmur, sternotomy 

incision clean dry and intact


Gastrointestinal:  Abdomen is soft and nontender


Neurological:  5/5 strength noted all 4 extremities


Skin:  Warm and dry, no rashes


Musculoskeletal:  Neck is supple nontender


Extremities:  symmetrical, full range of motion








Constitutional: 


 Initial Vital Signs











Temperature (C)  36.7 C   03/06/18 09:16


 


Heart Rate  92   03/06/18 09:16


 


Respiratory Rate  16   03/06/18 09:16


 


Blood Pressure  132/85 H  03/06/18 09:16


 


O2 Sat (%)  92   03/06/18 09:16








 











O2 Delivery Mode               Nasal Cannula


 


O2 (L/minute)                  2














Allergies/Adverse Reactions: 


 





Penicillins Allergy (Severe, Verified 12/20/17 07:29)


 Hives








Home Medications: 














 Medication  Instructions  Recorded


 


rOPINIRole HCL [Requip 0.25mg (RX)] 0.25 mg PO HS PRN 10/18/17


 


Metoprolol Tartrate [Lopressor 50 50 mg PO BID #60 tab 01/28/18





mg (*)]  


 


Alendronate Sodium [Fosamax 70 MG 70 mg PO ART@0700 02/20/18





(*)]  


 


LORazepam [Lorazepam] 0.5 mg PO BID PRN 02/20/18


 


Levothyroxine [Synthroid 50 mcg 50 mcg PO HS 02/20/18





(*)]  


 


Losartan Potassium [Cozaar 25 mg 25 mg PO HS 02/20/18





(*)]  


 


Furosemide [Lasix 40 MG (*)] 40 mg PO DAILY #30 tab 02/23/18


 


Acetaminophen [Tylenol 325mg (*)] 325 - 650 mg PO Q4HRS PRN 03/06/18


 


Aspirin [Aspirin 81mg (*)] 81 mg PO DAILY 03/06/18


 


Potassium Cl [Klor-Con 20 meq (*)] 10 meq PO DAILY 03/06/18


 


Warfarin Sodium [Coumadin 1MG (*)] 1 mg PO TUTH@18 03/06/18


 


Warfarin Sodium [Coumadin 1MG (*)] 1.5 mg PO SUMOWEFRSA@18 03/06/18














Medical Decision Making





- Diagnostics


Imaging Results: 


 Imaging Impressions





Chest X-Ray  03/06/18 09:11


Impression:


1. Worsening bilateral lower lobe pneumonia with small left pleural effusion.


2. No pneumothorax.











ED Course/Re-evaluation: 





The patient presents to emergency department after 3 cardioversions of a wide 

complex regular tachycardia by paramedics.  She did receive amiodarone prior to 

arrival.  She arrives in the emergency department in a sinus rhythm and is 

hemodynamically stable.





The patient was placed on a cardiac monitor.  She had an IV established.





I reviewed the patient's past medical records.  Consultation was made with Dr. Dr. Leigh from Cardiology who evaluated the patient in the emergency 

department at 9:45 a.m. In the morning.





Cardiology has requested the patient be admitted to the hospital for 

observation this evening.





Consultation was made with the hospitalist service at 10:00 a.m..  She will be 

admitted to the PCU unit by Dr. Meghann Moreno.





Update at 11:00 a.m.:  Dr. Leigh his informed me the patient is felt to have 

experienced an SVT.  He is still recommending admission to the hospital for 

observation.  He reports to me the patient will be seen in consultation by Dr. Martinez from electrophysiology.


Differential Diagnosis: 





Differential diagnosis considered includes ventricular tachycardia, atrial 

fibrillation, atrial flutter, SVT





- Data Points


Laboratory Results: 


 Laboratory Results





 03/06/18 09:27 





 03/06/18 09:27 





 











  03/06/18 03/06/18 03/06/18





  09:27 09:27 09:27


 


WBC      6.83 10^3/uL 10^3/uL





     (3.80-9.50) 


 


RBC      4.05 10^6/uL L 10^6/uL





     (4.18-5.33) 


 


Hgb      11.0 g/dL L g/dL





     (12.6-16.3) 


 


Hct      35.7 % L %





     (38.0-47.0) 


 


MCV      88.1 fL fL





     (81.5-99.8) 


 


MCH      27.2 pg L pg





     (27.9-34.1) 


 


MCHC      30.8 g/dL L g/dL





     (32.4-36.7) 


 


RDW      16.0 % H %





     (11.5-15.2) 


 


Plt Count      287 10^3/uL 10^3/uL





     (150-400) 


 


MPV      9.5 fL fL





     (8.7-11.7) 


 


Neut % (Auto)      78.9 % H %





     (39.3-74.2) 


 


Lymph % (Auto)      11.7 % L %





     (15.0-45.0) 


 


Mono % (Auto)      5.3 % %





     (4.5-13.0) 


 


Eos % (Auto)      1.9 % %





     (0.6-7.6) 


 


Baso % (Auto)      0.9 % %





     (0.3-1.7) 


 


Nucleat RBC Rel Count      0.0 % %





     (0.0-0.2) 


 


Absolute Neuts (auto)      5.39 10^3/uL 10^3/uL





     (1.70-6.50) 


 


Absolute Lymphs (auto)      0.80 10^3/uL L 10^3/uL





     (1.00-3.00) 


 


Absolute Monos (auto)      0.36 10^3/uL 10^3/uL





     (0.30-0.80) 


 


Absolute Eos (auto)      0.13 10^3/uL 10^3/uL





     (0.03-0.40) 


 


Absolute Basos (auto)      0.06 10^3/uL 10^3/uL





     (0.02-0.10) 


 


Absolute Nucleated RBC      0.00 10^3/uL 10^3/uL





     (0-0.01) 


 


Immature Gran %      1.3 % H %





     (0.0-1.1) 


 


Immature Gran #      0.09 10^3/uL 10^3/uL





     (0.00-0.10) 


 


PT    14.6 SEC SEC  





    (12.0-15.0)  


 


INR    1.12   





    (0.83-1.16)  


 


APTT    26.6 SEC SEC  





    (23.0-38.0)  


 


Sodium  139 mEq/L mEq/L    





   (135-145)   


 


Potassium  3.5 mEq/L mEq/L    





   (3.5-5.2)   


 


Chloride  98 mEq/L mEq/L    





   ()   


 


Carbon Dioxide  30 mEq/l mEq/l    





   (22-31)   


 


Anion Gap  11 mEq/L mEq/L    





   (8-16)   


 


BUN  13 mg/dL mg/dL    





   (7-23)   


 


Creatinine  0.7 mg/dL mg/dL    





   (0.6-1.0)   


 


Estimated GFR  > 60     





    


 


Glucose  150 mg/dL H mg/dL    





   ()   


 


Calcium  8.4 mg/dL L mg/dL    





   (8.5-10.4)   


 


Troponin I  < 0.012 ng/mL ng/mL    





   (0.000-0.034)   














Departure





- Departure


Disposition: [facility] Inpatient Acute


Clinical Impression: 


 Tachycardia





Condition: Good

## 2018-03-06 NOTE — GHP
[f rep st]



                                                            HISTORY AND PHYSICAL





DATE OF ADMISSION:  03/06/2018



CHIEF COMPLAINT:  Lightheadedness.



HISTORY OF PRESENT ILLNESS:  The patient is a 77-year-old female who has had a recent complex hospita
lization surrounding a mitral valve replacement.  She had a prolonged ICU stay, on a ventilator requi
ring tracheostomy.  She has been home from the hospital approximately 2 weeks and been doing relative
ly well, except for some ongoing edema in her legs.  She has been going to cardiac rehab and has been
 discharge by Home Health, PT/OT.  Today, however, she felt like her blood pressure was running low. 
 She felt very lightheaded and like she was going to pass out and so she called 911.  She denies any 
chest pain, but she did have some shortness of breath and palpitations.  Upon arrival, EMS found her 
to be in a wide-complex tachycardia.  She was cardioverted 3 times, unfortunately while she was wide 
awake, and she has memory of each cardioversion.  Post cardioversion, she went into AFib.  Eventually
, she got a dose of IV amiodarone by EMS and presented to the emergency room in normal sinus rhythm. 
 She is now feeling much improved.



PAST MEDICAL HISTORY:  

1.  Congestive heart failure secondary diastolic dysfunction and right heart failure.

2.  DVT.

3.  Atrial fibrillation.

4.  Mitral valve replacement, tricuspid valve annuloplasty, and maze procedure.



MEDICATIONS:  Please see computer record for full detailed list.



ALLERGIES:  Penicillin.



SOCIAL HISTORY:  No smoking.  She drinks 3 ounces of wine per night.  She lives alone.



REVIEW OF SYSTEMS:  Complete review of systems obtained.  Review of systems negative regarding consti
tutional, HEENT, GI, pulmonary, cardiovascular, , hematology, skin, musculoskeletal, endocrine, psy
ch.  Pertinent positives as in HPI.



FAMILY HISTORY:  Reviewed, noncontributory to presenting complaint.



PHYSICAL EXAMINATION:  GENERAL:  Well-developed, well-nourished female, in no acute distress.  VITAL 
SIGNS:  Temperature is 36.6, pulse 108, blood pressure 124/70, satting 96% on room air.  EYES:  Kendra
l conjunctivae.  Pupils react to light.  ENT:  Normal ears, nose.  Hearing intact.  Normal teeth.  Or
opharynx moist.  NECK:  Trachea midline.  No thyromegaly.  CHEST:  Normal respiratory effort.  LUNGS:
  Clear to auscultation bilaterally.  Decreased breath sounds left base.  No rales or rhonchi.  CARDI
OVASCULAR:  Regular rate and rhythm.  No murmur.  2+ lower extremity edema.  ABDOMEN:  Soft, nontende
r.  No hepatosplenomegaly.  SKIN:  Warm, dry, intact without rash.  MUSCULOSKELETAL:  No cyanosis or 
clubbing.  Strength 5/5 upper and lower extremities.  NEUROLOGIC:  Cranial nerves intact.  Normal sen
sation to light touch.  PSYCH:  Alert and oriented x3.  Normal affect.  Normal judgment.  Normal donna
ry.



LABORATORY DATA:  White count 6.3, hematocrit 35.7, platelets 287.  Sodium 139, potassium 3.5, chlori
de 98, bicarb 30, BUN 13, creatinine 0.7, glucose 150.  Troponins negative.  INR is 1.12.  EKG viewed
 by me.  My personal interpretation is normal sinus rhythm with right bundle branch block.  Chest x-r
ay shows a left basilar infiltrate and effusion.  Medical records have been reviewed regarding recent
 prolonged hospitalization.



ASSESSMENT/PLAN:  

1.  Possible ventricular tachycardia, status post cardioversion and subsequent intravenous amiodarone
, now in normal sinus rhythm.  Intravenous amiodarone drip will be continued pending cardiology consu
ltation.  Reportedly, Dr. Martinez will see her.  We will check an echocardiogram and serial troponins.  W
e will keep her potassium greater than 4 and magnesium greater than 2.

2.  Atrial fibrillation.  INR subtherapeutic on presentation.  We will bridge with subcutaneous Loven
ox until INR greater than 2.

3.  Edema.  Given her history of deep venous thrombosis, recent prolonged hospitalization, subtherape
utic INR, I will rule out deep venous thrombosis.  I would also consider increasing her Lasix dose. 

4.  Recent mitral valve replacement.  She had a prolonged course, as discussed above.  

5.  Left basilar infiltrate versus effusion.  Clinically, no current signs of pneumonia.  This chest 
x-ray is unchanged from her recent prolonged hospitalization where she was on a ventilator with trach
eostomy.  We will start her on IS and continue to watch for signs and symptoms of pneumonia.



CODE STATUS:  Full.



ADMISSION STATUS:  We will admit to observation.  Reevaluate tomorrow regarding ongoing hospitalizati
on.



DVT PROPHYLAXIS:  She will be on Lovenox, as discussed above.





Job #:  [id number]

## 2018-03-07 NOTE — ASMTCMCOM
CM Note

 

CM Note                       

Notes:

Pts case reviewed in morning rounds. CM met w/ pt and daughter for dispo planning. Pt is agreeable 

to having an RN. Pt had [facility] in the past and would like to use them again. Referral made to 

[facility]. [facility] is able to accept. CM to follow.



Plan: JOSHUA, RN

 

Date Signed:  03/07/2018 03:31 PM

Electronically Signed By:BEN Che

## 2018-03-07 NOTE — ECHO
[email or web address]





[address] [address] 

Main: [phone] 



Fax: 



Transthoracic Echocardiogram 

Name:             PHILOMENA CALI                            MR#:

[id number]

Study Date:       03/07/2018                              Study Time:

08:13 AM

YOB: 1940                              Age:

77 year(s)

Height:           165.1 cm (65 in.)                       Weight:

53.07 kg (117 lb.)

BSA:              1.58 m2                                 Gender:

Female

Examination:      Echo                                    Indication:

V-tach, Post Cardioversion in field, MVR, SOB

Image Quality:                                            Contrast: 

Requested by:     Meghann Moreno                          BP:

145 mmHg/82 mmHg

Heart Rate:       82 bpm                                  Rhythm:

Normal sinus rhythm

Indication:       V-tach, Post Cardioversion in field, MVR, SOB 



Procedure Staff 

Ultrasound Technician:   Waldemar Gill [facility] 

Reading Physician:       David Leigh MD 

Requesting Provider: 



Conclusions:          Normal size left ventricle.  

Borderline concentric LV hypertrophy.  

Normal global systolic LV function.  

EF is 73 %.  

No regional wall motion abnormality.  

Normal size right ventricle.  

The left atrium is mildly dilated.  

A bioprothetic mitral valve is in place.  

The MV VTI is 51.2 cm with a Mean PG of 9 mmHg, the previous mean MV

from 12/25/17 was 5

mmHg..  

The aortic valve is tri-leaflet.  

The aortic valve is normal in appearance.  

Mild tricuspid regurgitation is present.  

The pulmonary artery pressure is moderately increased.  

Right Ventricular systolic pressure is measured at 62 mmHg.  

There is a tricuspid valve ring.  

The aorta is normal.  

Left side pleural effusion. 



Measurements: 

Chambers                    Valvular Assessment AV/MV

Valvular Assessment TV/PV



Normal                                   Normal

Normal

Name         Value     Range              Name         Value Range

Name          Value Range

Ao Dolores (MM): 2.6 cm    (2.2 cm-3.7            AV Vmax:     1.60 m/s (1

m/s-1.7       TR Vmax:      3.79 mm/s ( - )



cm)                                  m/s)             TR PGmax:     57

mmHg ( - )

IVSd (2D):   0.8 cm (0.6 cm-1.1               AV maxPG:    10 mmHg ( -

)         syst. PAP: 62 mmHg  ( - )



cm)                LVOT Vmax:   1.22 m/s (0.7 m/s-1.1     PV Vmax:

1.44 m/s (0.6 m/s-0.9

LVDd (2D):   4.6 cm    (3.9 cm-5.3                              m/s)

m/s)



cm)                MV E Vmax:   2.05 m/s ( - )        PV PGmax:     8

mmHg ( - )

MV meanPG:   10 mmHg ( - )  



Patient: PHILOMENA CALI                         MRN: [id number]

Study Date: 03/07/2018   Page 1 of 2

08:13 AM 









LVDs (2D): 2.7 cm (2.1 cm-4 

cm)   



LVPWd (2D):  0.9 cm    ( - )   

LVEF (2D):   73        (>=54 %)   



Continued Measurements: 

Valvular Assessment AV/MV           Valvular Assessment TV/PV  



Name                      Value    Name                     Value  

MV VTI:                53.20 cm    CVP (est.):             5 mmHg    



Findings:             Left Ventricle: 

Normal size left ventricle. Borderline concentric LV hypertrophy.

Normal global systolic LV function.

EF is 73 %. No regional wall motion abnormality.  

Right Ventricle: 

Normal size right ventricle. Normal RV function.  

Left Atrium: 

The left atrium is mildly dilated.  

Right Atrium: 

The right atrium is normal in size.  

Mitral Valve: 

A bioprothetic mitral valve is in place. The MV VTI is 51.2 cm with a

Mean PG of 9 mmHg, the

previous mean MV from 12/25/17 was 5 mmHg..  

Aortic Valve: 

The aortic valve is tri-leaflet. The aortic valve is normal in

appearance.

Tricuspid Valve: 

Mild tricuspid regurgitation is present. The pulmonary artery pressure

is moderately increased. Right

Ventricular systolic pressure is measured at 62 mmHg. There is a

tricuspid valve ring.

Pulmonic Valve: 

Pulmonary valve not well visualized.  

Aorta: 

The aorta is normal.  

Pericardium: 

No pericardial effusion. Left side pleural effusion. 







Electronically signed by David Leigh MD on 03/07/2018 at 11:48 AM 

(No Signature Object) 



Patient: PHILOMENA CALI                         MRN: [id number]

Study Date: 03/07/2018   Page 2 of 2

08:13 AM 







[id number]

## 2018-03-07 NOTE — PDMN
Medical Necessity


Medical necessity: M510 supraventricular arrhythmias- A-1 day :  ongoing 

monitoring of tachycardia, poss V-tach in pt with hx of recent  MVR( 2 weeks) , 

CHF, afib, HTN, DVT, chronic AC with low INR, ongoing gen. weakness    - 

continue IV amiodarone , IV Lasix,  cardiology consult,  PT/OT  > 2 midnights

## 2018-03-07 NOTE — HOSPPROG
Hospitalist Progress Note


Assessment/Plan: 








#Wide complex tachycardia, possible V-tach


#CHF with mild exacerbation


#s/p MVR


#Chronic AC with low INR


#Hx of Afib


#HTN


#Hx of DVT


#Generalized weakness





Plan:


-Amio per Cards


-IV Lasix


-Lovenox/coumadin, pharm dosing


-Home meds


-PT/OT


-Keep overnight


Subjective: HR is better. BP is better. Cards to see. no cp or SOB. Still feels 

weak.


Objective: 


 Vital Signs











Temp Pulse Resp BP Pulse Ox


 


 36.6 C   62   20   103/51 L  96 


 


 03/07/18 11:58  03/07/18 11:58  03/07/18 11:58  03/07/18 11:58  03/07/18 11:58








 Laboratory Results





 03/07/18 04:33 





 03/07/18 04:33 





 











 03/06/18 03/07/18 03/08/18





 05:59 05:59 05:59


 


Intake Total  566 


 


Balance  566 








 











PT  15.4 SEC (12.0-15.0)  H  03/07/18  04:33    


 


INR  1.20  (0.83-1.16)  H  03/07/18  04:33    














- Physical Exam


Constitutional: no apparent distress


Eyes: PERRL, EOMI


Ears, Nose, Mouth, Throat: moist mucous membranes, hearing normal


Cardiovascular: regular rate and rhythym, edema


Respiratory: no respiratory distress, clear to auscultation


Gastrointestinal: normoactive bowel sounds, soft, non-tender abdomen


Genitourinary: no bladder fullness


Skin: warm


Musculoskeletal: generalized weakness


Neurologic: AAOx3


Psychiatric: interacting appropriately, not anxious, not encephalopathic


Lymph, Heme, Immunologic: No petechiae





ICD10 Worksheet


Patient Problems: 


 Problems











Problem Status Onset


 


Tachycardia Acute  


 


Acute blood loss anemia Acute  


 


Coagulopathy Acute  


 


S/P Maze operation for atrial fibrillation Acute  ~12/20/17


 


S/P mitral valve replacement with bioprosthetic valve Acute  ~12/20/17


 


S/P tricuspid valve repair Acute  ~12/20/17


 


Secondary tricuspid regurgitation Acute  


 


Severe mitral regurgitation Acute  


 


s/p placement LV epicardial lead Acute  ~12/20/17


 


Ascending aorta enlargement Chronic  


 


Atrial enlargement, bilateral Chronic  


 


Chronic anticoagulation Chronic  


 


Chronic diastolic congestive heart failure Chronic  


 


HTN (hypertension) Chronic  


 


Paroxysmal atrial fibrillation Chronic  


 


Pulmonary hypertension Chronic

## 2018-03-07 NOTE — ASMTCASEMG
Living Arrangements

 

What is your living           Answers:  Alone                                 

arrangement? Who do you                                                       

live with?                                                                    

Type Of Residence

 

What kind of residence do     Answers:  House                                 

you live in?                                                                  

Discharge Plan Comments

 

Coordination Status           

Comments                      

Notes:

Pt is a 76 y/o female admitted for tachycardia. Pt inquired about a life alert device. CM called 

life alert and is having a brochure sent to pts home. Pt will most likely not have any d/c 

needs. CM available for changes.







Plan: Independent 

 

Date Signed:  03/07/2018 10:20 AM

Electronically Signed By:BEN Che

## 2018-03-08 NOTE — PDHOMEO2F
Home Oxygen Face to Face


Home Orders: 


I certify that a physician or a nurse practitioner or physician's assistant has 

had a face-to-face encounter with this patient on the date of this order due to 

the diagnosis listed, which relates to the primary reason the patient requires 

home oxygen. Alternative treatments have been tried, or considered, and deemed 

ineffective. It is anticipated that supplemental oxygen will result in 

improvement with treatment.





Home oxygen qualifying diagnosis: hypoxemia


SpO2 on room air (%): 85


Frequency of home oxygen needed: continuous


Home oxygen liters per minute: 3


Home oxygen delivery device: nasal cannula


Concentrator: Yes


E-tanks for mobility and back up: Yes


If ordering portable O2, is the patient mobile in the home?: Yes


I certify that, based on these findings, the home oxygen is medically necessary 

for this patient for the following length of time.





Length of time home oxygen needed: 99 years

## 2018-03-08 NOTE — PDDCSUM
Discharge Summary


Discharge Summary: 


76 YO F admitted with possible Vtach. Please see H&P for complete details. 

Restarted on Amiodarone with resolution. 





consults: cardiology





she has f/u with Dr. Brandi Means next week








DDX:





#Wide complex tachycardia, possible V-tach, started on Amiodarone 200mg BID


#CHF with mild exacerbation, s/p IV Lasix, now back to home meds


#s/p MVR


#Chronic AC with low INR: Started on Lovenox bridge. Will cont on Coumadin, 

will have INR checked tomorrow.


#Hx of Afib


#HTN


#Hx of DVT


#Generalized weakness





Exam:


VSS


NAD


AAOX3


RRR


CTAB


S/NT/ND


TRACE EDEMA








MEDS: SEE MED REC. Amiodarone per above





total time spent on d/c is 35 minutes

## 2018-03-13 NOTE — GHP
[f rep st]



                                                            HISTORY AND PHYSICAL





DATE OF ADMISSION:  02/20/2018



Please note that her admission note is actually my office note from February 20, 2018, in [place]. T
here was an unstageable sacral wound present on admission. This had been noted from a previous visit,
 and the patient has been receiving home care treatment for this ulcer. This is, therefore, not a new
 problem for the patient.





Job #:  [id number]

## 2019-04-11 NOTE — GCON
[f rep st]



                                                                    CONSULTATION





CARDIOLOGY CONSULT



DATE OF CONSULTATION:  04/11/2019





CHIEF COMPLAINT:  Palpitations.



HISTORY OF PRESENT ILLNESS:  This is an 78-year-old female with a history of mitral valve replacement
, tricuspid valve annuloplasty, atrial fibrillation, status post maze procedure, and pulmonary hypert
ension, who presents to [facility] with complaints of palpitations.  Upon arrival to the Emergency Room, she
 was actually in normal sinus rhythm.  However, she was flipping in and out of AFib during her time i
n the ER.  Currently, the patient is in normal sinus rhythm.  She denies any chest pain or shortness 
of breath.  ECG shows sinus rhythm with no acute ST changes.  Troponins are negative x1 set.  BNP is 
359.



PAST MEDICAL HISTORY:  Significant for mitral valve replacement with tricuspid valve annuloplasty, AF
ib, status post maze procedure, pulmonary hypertension.



HOME MEDICATIONS:  Consist of alendronate, amlodipine, Eliquis, aspirin, Lasix, Synthroid, losartan, 
metoprolol.



SOCIAL HISTORY:  No drinking, no smoking.



FAMILY HISTORY:  Noncontributory.



REVIEW OF SYSTEMS:  The patient currently denies any visual changes.  No ear pain.  No headache.  No 
neck pain.  No nasal pain.  No throat pain.  No chest pain.  No back pain.  No abdominal discomfort. 
 No lower extremity pain.



PHYSICAL EXAMINATION:  VITAL SIGNS:  The patient was afebrile at 96.  Blood pressure __________ 126/7
0, with a heart rate of 82, respiratory rate of 12, saturation 95% on 2 L nasal cannula.  HEENT:  Pup
ils equal, round, reactive to light and accommodation.  Extraocular movements intact.  CARDIOVASCULAR
:  Regular rate and rhythm.  S1, S2.  2/6 systolic murmur heard best at the right upper sternal borde
r.  LUNGS:  Clear to auscultation bilaterally.  ABDOMEN:  Soft, nontender.  No guarding.  EXTREMITIES
:  No clubbing, no cyanosis, no edema.  NEUROLOGIC:  The patient is alert and oriented x3.



LABORATORY VALUES:  Currently show a creatinine of 0.8, BUN 29, potassium 4.0, hemoglobin 10.3, hemat
ocrit 34.1, and platelet count 195.



ASSESSMENT/PLAN:  Atrial fibrillation with rapid ventricular response.  At this time, the patient has
 normal sinus rhythm.  She does have paroxysmal bursts of atrial fibrillation.  I would suggest incre
asing her Toprol-XL from 50 to 75, and continue all her other home medications including her Eliquis.
  The patient has indicated to me that she has been having numerous issues with bleeding related to b
eisulema on Eliquis including rectal bleeding in the last month or so.  She will be following up with Dr. Means within the week.  I feel that she can be discharged today with her home medications including t
he Eliquis and the increased dose of Toprol-XL.  Follow up with Dr. Means.  I will communicate with Dr Yrn Means directly about potentially discussing with the patient and her about a Watchman procedure give
n her atrial fibrillation, elevated CHADS-VASC score, and issues with recent bleeding.  This discussi
on could be handled as an outpatient.  We will follow as needed.  



Thank you for the consultation.





Job #:  [id number]

## 2019-04-11 NOTE — ASMTLACE
LACE

 

Length of stay for            Answers:  Less than 1 day                       

current admission                                                             

Acuity / Level of             Answers:  No                                    

Care: Did the patient                                                         

have an inpatient                                                             

admission?                                                                    

Comorbidities - select        Answers:  Chronic pulmonary disease             

all that apply                                                                

# of Emergency department     Answers:  1-2                                   

visits in the last 6                                                          

months                                                                        

Score: 3

 

Date Signed:  04/11/2019 10:20 AM

Electronically Signed By:Radha Genao RN

## 2019-04-11 NOTE — ASDISCHSUM
----------------------------------------------

Discharge Information

----------------------------------------------

Plan Status:Home with No Needs                       Medically Cleared to Leave:04/11/2019

Discharge Date:04/11/2019                            CM D/C Disposition:Home, Routine, Self-Care

ADT D/C Disposition:Home, Routine, Self-Care         Projected Discharge Date:04/11/2019

Transportation at D/C:                               Discharge Delay Reason:

Follow-Up Date:04/11/2019                            Discharge Slot:

Final Diagnosis:

----------------------------------------------

Placement Information

----------------------------------------------

----------------------------------------------

Patient Contact Information

----------------------------------------------

Contact Name:SAMANTHA                             Relationship:Daughter

Address:[address]                                   Home [phone]

                                                     Work Phone:

[facility]:[place]                                      Alternate Phone:

[facility]/Zip Code:[place] [address]                              Email:

----------------------------------------------

Financial Information

----------------------------------------------

Financial Class:Medicare Advantage Plans

Primary Plan Desc:[employer]         Primary Plan Number:[id number]

Secondary Plan Desc:                                 Secondary Plan Number:

 

 

----------------------------------------------

Assessment Information

----------------------------------------------

----------------------------------------------

LACE

----------------------------------------------

LACE

 

Length of stay for            Answers:  Less than 1 day                       

current admission                                                             

Acuity / Level of             Answers:  No                                    

Care: Did the patient                                                         

have an inpatient                                                             

admission?                                                                    

Comorbidities - select        Answers:  Chronic pulmonary disease             

all that apply                                                                

# of Emergency department     Answers:  1-2                                   

visits in the last 6                                                          

months                                                                        

Score: 3

 

Date Signed:  04/11/2019 10:20 AM

Electronically Signed By:Radha Genao RN

 

 

----------------------------------------------

Intervention Information

----------------------------------------------

## 2019-04-11 NOTE — PDDCSUM
Discharge Summary


Discharge Summary: 





DISCHARGE DIAGNOSES:


* Acute rapid atrial fibrillation with history of paroxysmal atrial fibrillation


* Iron deficiency anemia with new onset of microcytosis


* Chronic pulmonary hypertension





CONSULTANTS:


 [facility] COURSE SUMMARY:


This patient with a long history of paroxysmal atrial fibrillation has been 

having episodes intermittently briefly of rapid AFib over the last several 

weeks.  Last night she had a very prolonged episode leading her to feel like 

she might have syncope.  She presented to the ER.  She did have heart rates in 

the 140s.  At this point she is spontaneously converted to a sinus rhythm.  She 

is taking metoprolol XL 50 mg at bedtime for rate control at home and clearly 

this is not holding her heart rate.  Here she has had no signs of heart failure 

or ischemia.  Is recommended at this point that she increase metoprolol to 75 

mg at bedtime.  Is felt that she is stable for discharge to home at present.





In addition it was noted that the patient has anemia worse than her baseline 

anemia but also that now her MCV has decreased from the mid to upper 80s to 78 

over the past year.  1 year ago she had iron binding saturation of 8%.  Thus 

she appears to have some chronic iron deficiency that has now progressed to the 

point of causing worsening anemia with onset of microcytosis.  Given the fact 

that she has pulmonary hypertension and atrial fibrillation the iron deficiency 

is probably significantly impacting her heart disease as well as skeletal 

muscular function.  The patient had a colonoscopy 2 years ago with the only 

finding being diverticulosis.  She does intermittently have some bright red 

blood with her stools.  It is suspected she is probably having bleeding from 

diverticuli with her anticoagulant.  In addition the patient has had iron 

deficiency anemia going back to childhood and is possible she may have trouble 

absorbing iron.  She has found iron tablets to be constipating.  Here she is 

receiving 1 dose of IV iron sucrose and she will be prescribed ferrous sequels 

to see if she tolerates these.  If she tolerates them she can have her iron 

levels rechecked in 8-12 weeks to see if she is maintaining better iron levels.

  If the iron levels are not in good range on follow-up, she should be 

considered for either intermittent IV iron infusions in the outpatient setting 

or possibly upper endoscopic evaluation to look for possible malabsorption of 

iron.  Also she does not tolerate the oral iron replacement than IV iron 

replacement may be a good solution for her in the long run.


All of this is been reviewed with the patient in detail here today.





PENDING TEST RESULTS:


None





MEDICATION CHANGES:


Change of Toprol XL from 50 mg at night to 75 mg at night (50+25)


Addition of iron supplement





FOLLOW-UP PLAN:


In Cardiology Clinic with Dr. Means next week


A primary care clinic with Dr. Roseann Bautista in 2-4 weeks


She should have iron studies including iron saturation and ferritin retested in 

8-12 weeks


I reviewed all these plans with the patient in detail





Greater than 35 minutes bedside and care coordination time today

## 2019-04-11 NOTE — EDPHY
H & P


Stated Complaint: a-fib


Time Seen by Provider: 04/11/19 03:14


HPI/ROS: 





HPI


The patient presents with an episode of palpitations which awoke her from sleep 

sometime this morning.  She had a normal day today, went to exercise class and 

walked on the treadmill for 20 min without any symptoms.  She felt well when 

she went to bed.  She awoke at some point in the night and noticed that her 

heart was racing.  She waited several minutes and hopes that it would pass, 

however it did not.  She sat on the side of her bed and checked her heart rate 

with her pulse oximeter and did read 180 beats per minute.  She then felt 

somewhat lightheaded and began to lose her vision.  She was concerned, so she 

called 911. The ambulance took some time to arrive, and when it did her 

symptoms had markedly improved.  She denied any chest pain or shortness of 

breath.  Paramedics noted a slightly wide complex regular rhythm.  She was not 

administered any medications.  On arrival to the emergency department, she 

generally feels much better.  She has not had any similar symptoms recently and 

says she has been feeling quite well.





She is followed by Dr. Means at [facility].  She was admitted to the hospital 

last year after complicated mitral valve replacement, Maze procedure and was in 

the hospital with a prolonged ICU admission.  She recently started a lower dose 

of metoprolol.  She does have intermittent atrial fibrillation and atrial 

tachycardia and she wonders if this could have been the cause of her symptoms 

tonight however she has not had similar symptoms for about 1 year and this is 

disconcerting to her.





REVIEW OF SYSTEMS


10 systems were reviewed and negative with the exception of the elements 

mentioned in the history of present illness.





PMHx:  Atrial fibrillation, secondary pulmonary hypertension, status post 

mitral valve repair, chronic anticoagulation on Eliquis





Soc Hx:  Housed by herself








PHYSICAL


General Appearance: Alert, no distress


Eyes: Pupils equal and round no pallor or injection


ENT, Mouth: Mucous membranes moist


Respiratory: There are no retractions, lungs are clear to auscultation


Cardiovascular:  Regular rate and rhythm 


Gastrointestinal:  Abdomen is soft and non-tender, no masses, bowel sounds 

normal 


Neurological:  A&O, moves all extremities


Skin:  Warm and dry, no rashes


Musculoskeletal: Neck is supple non tender 


Extremities:  symmetrical, full range of motion, mild left ankle edema


Psychiatric:  Patient is oriented X 3, there is no agitation 





Source: Patient, EMS


Exam Limitations: No limitations





- Personal History


Current Tetanus Diphtheria and Acellular Pertussis (TDAP): Yes


Tetanus Vaccine Date: <10 YRS





- Medical/Surgical History


Hx Asthma: No


Hx Chronic Respiratory Disease: No


Hx Diabetes: No


Hx Cardiac Disease: Yes


Hx Renal Disease: No


Hx Cirrhosis: No


Hx Alcoholism: No


Hx HIV/AIDS: No


Hx Splenectomy or Spleen Trauma: No


Other PMH: medical- chf,Mitral valve regurgitation/open heart with a ring, 

Hypothroid 10/13. Gerd, raynauds, DVT, connective tissue disorder.  surgical-

tubal ligation.





- Social History


Smoking Status: Never smoked


Constitutional: 


 Initial Vital Signs











Temperature (C)  36.7 C   04/11/19 02:25


 


Heart Rate  91   04/11/19 02:25


 


Respiratory Rate  20   04/11/19 02:25


 


Blood Pressure  148/79 H  04/11/19 02:25


 


O2 Sat (%)  95   04/11/19 02:25








 











O2 Delivery Mode               Nasal Cannula


 


O2 (L/minute)                  2














Allergies/Adverse Reactions: 


 





Penicillins Allergy (Severe, Verified 04/11/19 02:40)


 Hives








Home Medications: 














 Medication  Instructions  Recorded


 


Acetaminophen [Tylenol  mg 1,000 mg PO Q6 PRN 04/11/19





(*)]  


 


Alendronate Sodium [Fosamax 70 MG 70 mg PO ART@0700 04/11/19





(*)]  


 


Apixaban [Eliquis] 2.5 mg PO BID 04/11/19


 


Aspirin [Aspirin 81mg (*)] 81 mg PO DAILY 04/11/19


 


Furosemide [Lasix 20 MG (*)] 20 mg PO DAILY14 04/11/19


 


Furosemide [Lasix 20 MG (*)] 40 mg PO DAILY 04/11/19


 


Iron/Vit C/Docusate [Michaela-Sequels 1 each PO DAILY #30 tab.er 04/11/19





65 mg (*)]  


 


Levothyroxine [Synthroid 75 mcg 75 mcg PO DAILY06 04/11/19





(*)]  


 


Losartan Potassium [Cozaar 50 mg 50 mg PO BID 04/11/19





(*)]  


 


Metoprolol Succinate Xr [Toprol Xl 25 mg PO HS #30 tab.sr 04/11/19





25 mg (*)]  


 


Metoprolol Succinate Xr [Toprol Xl 50 mg PO HS #1 tab.sr 04/11/19





50 mg (*)]  


 


Potassium Cl [Klor-Con 10 meq (RX)] 10 meq PO DAILY 04/11/19


 


amLODIPine BESYLATE [Norvasc 2.5 2.5 mg PO HS 04/11/19





mg (*)]  


 


rOPINIRole HCL [Requip 0.25mg (RX)] 0.5 mg PO HS 04/11/19














Medical Decision Making





- Diagnostics


EKG Interpretation: 





EKG:  Complete interpretation has been separately recorded in the [facility] 

archive.  Summary impression:  Sinus rhythm with right bundle branch block





Imaging Results: 





Chest x-ray single view demonstrates cardiomegaly, possible left-sided pleural 

effusion, scarring of the right lung base, interpreted by me, radiology 

interpretation is pending.


Imaging: I viewed and interpreted images myself


Differential Diagnosis: 





This is an 78-year-old female with history of valvular heart disease, status 

post mitral valve replacement, Maze procedure, paroxysmal atrial fibrillation, 

atrial tachycardia wears oxygen at night, who presents with palpitations which 

awoke her from sleep at night associated with presyncopal type symptoms.  She 

had some sort of tachycardia likely which has now resolved based on her pulse 

oximeter.





Here, she is feeling much better with no ongoing symptoms.





Differential diagnosis includes paroxysmal atrial fibrillation now resolved, 

atrial tachycardia, ventricular tachycardia.





Patient was placed on the cardiac monitor.  Basic labs were checked and were 

relatively unremarkable including a BNP and troponin.  EKG shows right bundle-

branch block without any arrhythmia.  She was given a small fluid bolus.





At about 5:00 a.m., she entered into AFib with RVR at a rate of about 120. She 

spontaneously converted.  I question if she is intermittently entering AFib 

with RVR.  Because of this and her significant past cardiac history I will 

admit her for telemetry monitoring.  The case is discussed with Dr. Vieira.





- Data Points


Laboratory Results: 


 Laboratory Results





 04/11/19 02:55 





 04/11/19 02:55 








Medications Given: 


 








Discontinued Medications





Apixaban (Eliquis)  2.5 mg PO BID [facility]


   Stop: 10/08/19 09:59


   Last Admin: 04/11/19 10:34 Dose:  2.5 mg


Furosemide (Lasix)  20 mg PO DAILY14 [place]


   Stop: 10/08/19 13:59


   Last Admin: 04/11/19 14:17 Dose:  20 mg


Furosemide (Lasix)  40 mg PO DAILY [place]


   Stop: 10/08/19 09:59


   Last Admin: 04/11/19 10:34 Dose:  40 mg


Sodium Chloride (Ns)  500 mls @ 0 mls/hr IV EDNOW ONE; Wide Open


   PRN Reason: Protocol


   Stop: 04/11/19 02:45


   Last Admin: 04/11/19 03:00 Dose:  500 mls


Ferric Sodium Gluconate Complex 125 mg/ Sodium Chloride  110 mls @ 110 mls/hr 

IV ONCE ONE


   Stop: 04/11/19 10:52


   Last Admin: 04/11/19 10:33 Dose:  110 mls


Levothyroxine Sodium (Synthroid)  75 mcg PO DAILY06 [place]


   Stop: 10/08/19 09:59


   Last Admin: 04/11/19 10:35 Dose:  75 mcg


Losartan Potassium (Cozaar)  50 mg PO BID [place]


   Stop: 10/08/19 09:59


   Last Admin: 04/11/19 10:34 Dose:  50 mg


Potassium Chloride (Klor-Con)  10 meq PO DAILY [place]


   Stop: 10/08/19 09:59


   Last Admin: 04/11/19 10:34 Dose:  10 meq





Point of Care Test Results: 


 Chemistry











  04/11/19





  02:58


 


POC Troponin I  0.01 ng/mL ng/mL





   (0.00-0.08) 














Departure





- Departure


Disposition: [facility]


Clinical Impression: 


 Palpitations, Pre-syncope





Atrial fibrillation


Qualifiers:


 Atrial fibrillation type: paroxysmal Qualified Code(s): I48.0 - Paroxysmal 

atrial fibrillation





Condition: Fair

## 2019-04-11 NOTE — PDGENHP
History and Physical





- Chief Complaint


Tachycardia





- History of Present Illness


77 yo F w/ hx of MV replacement, TV annuloplasty, AF s/p MAZE procedure, and 

pHTN presents with tachycardia. The patient was sleeping when she noted 

palpitations. She checked her HR and it was in the 180s. She tried to get up to 

call 911 and almost fainted. She was able to get to the phone and call EMS. She 

was brought to the ED for evaluation. In the ED she was observed for several 

hours and has been noted to be converting in and out of atrial fibrillation. 

During the time of my evaluation she is in sinus rhythm with occasional PVCs. 

She has been compliant with nightly Toprol XL 50 mg as well as Eliquis. She 

weighs herself daily and her weight has been unchanged from 122 lbs. Her BNP is 

lower than prior values today. She is being admitted for observation and 

cardiology consultation.





Case discussed with ED physician Dr. Calderon; records reviewed and summarized 

above.





History Information





- Allergies/Home Medication List


Allergies/Adverse Reactions: 








Penicillins Allergy (Severe, Verified 19 02:40)


 Hives





Home Medications: 








ALENDRONATE SODIUM  19 [Last Taken Unknown]


Amlodipine Besylate  19 [Last Taken Unknown]


Apixaban [Eliquis]  19 [Last Taken Unknown]


Aspirin 81mg (*)  19 [Last Taken Unknown]


Furosemide  19 [Last Taken Unknown]


Levothyroxine  19 [Last Taken Unknown]


Losartan Potassium  19 [Last Taken Unknown]


Metoprolol Succinate  19 [Last Taken Unknown]


Miralax 17 gm (*)  19 [Last Taken Unknown]


Potassium Chloride  19 [Last Taken Unknown]


Ropinirole HCl  19 [Last Taken Unknown]


Tylenol 325mg (*)  19 [Last Taken Unknown]





I have personally reviewed and updated: family history, medical history





- Past Medical History


atrial fibrillation


Additional medical history: pHTN





- Surgical History


Additional surgical history: MV replacement.  TV annuloplasty.  MAZE procedure





- Family History


Additional family history: Parents 





- Social History


Smoking Status: Never smoked





Review of Systems


Review of Systems: 





ROS: 10pt was reviewed & negative except for what was stated in HPI & below





Physical Exam


Physical Exam: 

















Temp Pulse Resp BP Pulse Ox


 


 36.7 C   118 H  18   139/68 H  93 


 


 19 02:25  19 05:05  19 04:00  19 05:05  19 05:05











Constitutional: no apparent distress, not in pain


Eyes: PERRL, EOMI


Ears, Nose, Mouth, Throat: moist mucous membranes, no oral mucosal ulcers


Cardiovascular: regular rate and rhythym, systolic murmur, No edema


Respiratory: no respiratory distress, clear to auscultation


Gastrointestinal: normoactive bowel sounds, soft, non-tender abdomen


Skin: warm, normal color


Musculoskeletal: full muscle strength, no muscle tenderness


Neurologic: AAOx3, CN II-XII Intact


Psychiatric: interacting appropriately, not anxious





Lab Data & Imaging Review





 19 02:55





 19 02:55














WBC  8.16 10^3/uL (3.80-9.50)   19  02:55    


 


RBC  4.47 10^6/uL (4.18-5.33)   19  02:55    


 


Hgb  10.3 g/dL (12.6-16.3)  L  19  02:55    


 


Hct  34.9 % (38.0-47.0)  L  19  02:55    


 


MCV  78.1 fL (81.5-99.8)  L  19  02:55    


 


MCH  23.0 pg (27.9-34.1)  L  19  02:55    


 


MCHC  29.5 g/dL (32.4-36.7)  L  19  02:55    


 


RDW  15.1 % (11.5-15.2)   19  02:55    


 


Plt Count  195 10^3/uL (150-400)   19  02:55    


 


MPV  9.9 fL (8.7-11.7)   19  02:55    


 


Neut % (Auto)  69.4 % (39.3-74.2)   19  02:55    


 


Lymph % (Auto)  15.0 % (15.0-45.0)   19  02:55    


 


Mono % (Auto)  11.2 % (4.5-13.0)   19  02:55    


 


Eos % (Auto)  3.6 % (0.6-7.6)   19  02:55    


 


Baso % (Auto)  0.4 % (0.3-1.7)   19  02:55    


 


Nucleat RBC Rel Count  0.0 % (0.0-0.2)   19  02:55    


 


Absolute Neuts (auto)  5.68 10^3/uL (1.70-6.50)   19  02:55    


 


Absolute Lymphs (auto)  1.22 10^3/uL (1.00-3.00)   19  02:55    


 


Absolute Monos (auto)  0.91 10^3/uL (0.30-0.80)  H  19  02:55    


 


Absolute Eos (auto)  0.29 10^3/uL (0.03-0.40)   19  02:55    


 


Absolute Basos (auto)  0.03 10^3/uL (0.02-0.10)   19  02:55    


 


Absolute Nucleated RBC  0.00 10^3/uL (0-0.01)   19  02:55    


 


Immature Gran %  0.4 % (0.0-1.1)   19  02:55    


 


Immature Gran #  0.03 10^3/uL (0.00-0.10)   19  02:55    


 


Sodium  135 mEq/L (135-145)   19  02:55    


 


Potassium  4.0 mEq/L (3.5-5.2)   19  02:55    


 


Chloride  96 mEq/L ()  L  19  02:55    


 


Carbon Dioxide  32 mEq/l (22-31)  H  19  02:55    


 


Anion Gap  7 mEq/L (6-14)   19  02:55    


 


BUN  29 mg/dL (7-23)  H  19  02:55    


 


Creatinine  0.8 mg/dL (0.6-1.0)   19  02:55    


 


Estimated GFR  > 60   19  02:55    


 


Glucose  105 mg/dL ()  H  19  02:55    


 


Calcium  8.7 mg/dL (8.5-10.4)   19  02:55    


 


Magnesium  2.1 mg/dL (1.6-2.3)   19  02:55    


 


POC Troponin I  0.01 ng/mL (0.00-0.08)   19  02:58    


 


NT-Pro-B Natriuret Pep  359 pg/mL (0-450)   19  02:55    








Visualized and Interpreted EKG results: Yes


EKG Interpretation: Positive for: normal sinsus rhythm





Assessment & Plan


Assessment: 





77 yo F w/ hx of MV replacement, TV annuloplasty, AF s/p MAZE procedure, and 

pHTN presents with AF w/ RVR.


Plan: 


1. AF w/ RVR - HR in the 180s awoke her from sleep; she was back in NSR by the 

time she arrived in the ED. While in the ED, however, she was noted to convert 

in and out of AF several time, often with elevated rates. She has been 

compliant with home metoprolol and apixaban. She appears euvolemic on exam and 

BNP is lower than previous values.


- Observe in PCU


- Monitor on telemetry


- Continue home medications pending reconciliation


- Will place cardiology consultation, she is a patient of Dr. Means's


2. Hx dCHF, pHTN - She appears euvolemic on exam. She takes furosemide 40 mg 

qAM and 20 mg qPM. She weighs herself daily and this has been stable at 122 

lbs. 


- Continue home furosemide


- Daily weights, monitor I/Os, cardiac diet


3. Valvular hx disease - S/p redo mitral valve replacement and TV annuloplasty.


4. HTN - On metoprolol, losartan, and amlodipine as an outpatient.





Diet - Cardiac


Code - Full


Ppx - apixaban


Dispo - Admit under observation status

## 2019-04-11 NOTE — CPEKG
Test Reason : OPEN

Blood Pressure : ***/*** mmHG

Vent. Rate : 083 BPM     Atrial Rate : 079 BPM

   P-R Int : 046 ms          QRS Dur : 139 ms

    QT Int : 438 ms       P-R-T Axes : 000 123 -28 degrees

   QTc Int : 515 ms

 

Sinus rhythm

Atrial premature complex

Short NM interval

RBBB and LPFB

Anteroseptal infarct, age indeterminate

Lateral leads are also involved

 

Confirmed by Brenda Calderon ([id number]) on 4/11/2019 5:52:26 AM

 

Referred By: PHYSICIAN ED           Confirmed By:Brenda Calderon

## 2023-03-01 NOTE — PDINTPN
Intensivist Progress Note


Assessment/Plan: 


Assessment:





77 F s/p MV redo with Maze procedure complicated by recurrent (known) PAF and 

respiratory failure. 





* Acute Respiratory failure, recurrent, with hypoxia and hypercapnia likely 2/2 

advanced age with diaphragmatic dysfunction and multiple failed extubations. 

Trach placed 12/26 by ENT in OR. Cannot rule out contribution from PNA as well 

initially given elevated WBC. Completed 10 days of Cefepime. Trach downsized to 

6.0 1/7. Tolerating being off the ventilator for periods of time but recurrent 

episodes of respiratory failure occur, probably secondary to respiratory muscle 

fatigue.  No evidence for mucous plugging or a primary cardiac event. CO2 

higher yesterday morning after being off ventilatory support overnight, 68 with 

a pH of 7.33.  She is back on BiPAP at night, but is refusing it during the day.





* Secretions:  Clinically this appeared to be fairly mild but she seems to have 

some respiratory desaturation associated with mucus and possible mucous 

plugging.  Bronchoscopy to rule out larger distal plugs and obtain deep 

cultures may be of benefit.  This will be done today.





* FEN- eating, appetite better.





* Hypernatremia- increased FW 12/29.  Sodium last 139.





* Afib- on amiodarone and metoprolol, oral diltiazem. In NSR...





* s/p MVR redo, TV annuloplasty.  On full-dose anticoagulation but INR low.  

Per [facility]





* Anemia; Hgb stable. no signs of active bleeding.





* GI prophylaxis- pantoprazole.











Plan:  Continue with trach collar during the day, BiPAP support at night and 

p.r.n. in the day if needed.  Continue bronchopulmonary therapies.  Continue 

supportive care.  Increase ambulation.  In light of episodic recurrent 

respiratory failure episodes she will be unable to go to inpatient rehab.  LTAC 

placement in the works.  She needs to stay in the intensive care unit and 

monitored closely secondary to respiratory events.  Bronchoscopy will be done 

today.








25 min of critical care time spent directly with the patient, not including 

bronchoscopy.  Discussed with the patient and her daughter, nursing, and 

respiratory therapy.  














Subjective: 





Slept with BiPAP until about 2:00 a.m..  Woke up at that point in time and did 

not use it any longer.  However nurses report to episodes of desaturation 

needing bagging associated with increased secretions.


Objective: 





 Vital Signs











Temp Pulse Resp BP Pulse Ox


 


 36.4 C   65   27 H  112/52 L  90 L


 


 01/11/18 11:47  01/11/18 11:47  01/11/18 11:47  01/11/18 11:47  01/11/18 11:47








 Laboratory Results





 01/10/18 03:20 





 01/10/18 03:20 





 











 01/10/18 01/11/18 01/12/18





 05:59 05:59 05:59


 


Intake Total 1460 1140 500


 


Output Total 1675 1500 650


 


Balance -215 -360 -150








 











PT  25.7 SEC (12.0-15.0)  H  01/11/18  04:10    


 


INR  2.35  (0.83-1.16)  H  01/11/18  04:10    














Physical Exam





- Physical Exam


General Appearance: alert, no apparent distress, thin, other (On trach collar, 

PM valve.)


EENT: PERRL/EOMI, other (PM valve in place on tracheostomy)


Neck: other (Trach collar)


Respiratory: decreased breath sounds (Bilaterally, however breath sounds and 

excursions do seem to be somewhat better.), rales (Some rales, bronchial 

changes on left at the base.), rhonchi (Congestion with cough, fairly mild), No 

pleural rub


Cardiac/Chest: regular rate, rhythm, systolic murmur, No gallop


Abdomen: normal bowel sounds, non-tender, soft


Skin: normal color, warm/dry


Extremities: No pedal edema


Neuro/Psych: no motor/sensory deficits, No cognition abnormalities





ICD10 Worksheet


Patient Problems: 


 Problems











Problem Status Onset


 


s/p placement LV epicardial lead Acute  ~12/20/17


 


HTN (hypertension) Chronic  


 


Chronic diastolic congestive heart failure Chronic  


 


Coagulopathy Acute  


 


Acute blood loss anemia Acute  


 


S/P Maze operation for atrial fibrillation Acute  ~12/20/17


 


S/P tricuspid valve repair Acute  ~12/20/17


 


S/P mitral valve replacement with bioprosthetic valve Acute  ~12/20/17


 


Chronic anticoagulation Chronic  


 


Paroxysmal atrial fibrillation Chronic  


 


Pulmonary hypertension Chronic  


 


Ascending aorta enlargement Chronic  


 


Atrial enlargement, bilateral Chronic  


 


Secondary tricuspid regurgitation Acute  


 


Severe mitral regurgitation Acute Detail Level: Simple Render In Strict Bullet Format?: No Continue Regimen: Cerave daily for up to 2-3 weeks after resolved. Continue hydrocortisone to rash on face. Initiate Treatment: Triamcinolone cream to rash on arm for up to 5 days after resolved.